# Patient Record
Sex: FEMALE | Race: WHITE | Employment: UNEMPLOYED | URBAN - METROPOLITAN AREA
[De-identification: names, ages, dates, MRNs, and addresses within clinical notes are randomized per-mention and may not be internally consistent; named-entity substitution may affect disease eponyms.]

---

## 2019-04-02 ENCOUNTER — APPOINTMENT (OUTPATIENT)
Dept: CT IMAGING | Age: 59
DRG: 751 | End: 2019-04-02
Payer: MEDICAID

## 2019-04-02 ENCOUNTER — HOSPITAL ENCOUNTER (INPATIENT)
Age: 59
LOS: 14 days | Discharge: HOME | DRG: 751 | End: 2019-04-16
Attending: EMERGENCY MEDICINE | Admitting: PSYCHIATRY & NEUROLOGY
Payer: MEDICAID

## 2019-04-02 DIAGNOSIS — N13.30 HYDRONEPHROSIS, UNSPECIFIED HYDRONEPHROSIS TYPE: Primary | ICD-10-CM

## 2019-04-02 DIAGNOSIS — R45.851 SUICIDAL IDEATION: ICD-10-CM

## 2019-04-02 DIAGNOSIS — N20.1 URETERAL CALCULUS: ICD-10-CM

## 2019-04-02 PROBLEM — F32.9 MAJOR DEPRESSION, CHRONIC: Status: ACTIVE | Noted: 2019-04-02

## 2019-04-02 PROBLEM — F32.2 SEVERE MAJOR DEPRESSIVE DISORDER (HCC): Status: ACTIVE | Noted: 2019-04-02

## 2019-04-02 LAB
ABSOLUTE EOS #: 0.3 K/UL (ref 0–0.4)
ABSOLUTE IMMATURE GRANULOCYTE: ABNORMAL K/UL (ref 0–0.3)
ABSOLUTE LYMPH #: 1.4 K/UL (ref 1–4.8)
ABSOLUTE MONO #: 0.7 K/UL (ref 0.1–1.3)
ALBUMIN SERPL-MCNC: 4 G/DL (ref 3.5–5.2)
ALBUMIN/GLOBULIN RATIO: ABNORMAL (ref 1–2.5)
ALP BLD-CCNC: 112 U/L (ref 35–104)
ALT SERPL-CCNC: 95 U/L (ref 5–33)
AMPHETAMINE SCREEN URINE: NEGATIVE
ANION GAP SERPL CALCULATED.3IONS-SCNC: 14 MMOL/L (ref 9–17)
AST SERPL-CCNC: 46 U/L
BARBITURATE SCREEN URINE: NEGATIVE
BASOPHILS # BLD: 1 % (ref 0–2)
BASOPHILS ABSOLUTE: 0.1 K/UL (ref 0–0.2)
BENZODIAZEPINE SCREEN, URINE: NEGATIVE
BILIRUB SERPL-MCNC: 0.31 MG/DL (ref 0.3–1.2)
BILIRUBIN URINE: NEGATIVE
BUN BLDV-MCNC: 17 MG/DL (ref 8–23)
BUN/CREAT BLD: ABNORMAL (ref 9–20)
BUPRENORPHINE URINE: ABNORMAL
CALCIUM SERPL-MCNC: 9.4 MG/DL (ref 8.6–10.4)
CANNABINOID SCREEN URINE: POSITIVE
CHLORIDE BLD-SCNC: 103 MMOL/L (ref 98–107)
CO2: 23 MMOL/L (ref 20–31)
COCAINE METABOLITE, URINE: POSITIVE
COLOR: YELLOW
COMMENT UA: NORMAL
CREAT SERPL-MCNC: 0.73 MG/DL (ref 0.5–0.9)
DIFFERENTIAL TYPE: ABNORMAL
EOSINOPHILS RELATIVE PERCENT: 3 % (ref 0–4)
GFR AFRICAN AMERICAN: >60 ML/MIN
GFR NON-AFRICAN AMERICAN: >60 ML/MIN
GFR SERPL CREATININE-BSD FRML MDRD: ABNORMAL ML/MIN/{1.73_M2}
GFR SERPL CREATININE-BSD FRML MDRD: ABNORMAL ML/MIN/{1.73_M2}
GLUCOSE BLD-MCNC: 99 MG/DL (ref 70–99)
GLUCOSE URINE: NEGATIVE
HCT VFR BLD CALC: 43.6 % (ref 36–46)
HEMOGLOBIN: 14.4 G/DL (ref 12–16)
IMMATURE GRANULOCYTES: ABNORMAL %
INR BLD: 0.9
KETONES, URINE: NEGATIVE
LEUKOCYTE ESTERASE, URINE: NEGATIVE
LIPASE: 36 U/L (ref 13–60)
LYMPHOCYTES # BLD: 17 % (ref 24–44)
MCH RBC QN AUTO: 27.5 PG (ref 26–34)
MCHC RBC AUTO-ENTMCNC: 32.9 G/DL (ref 31–37)
MCV RBC AUTO: 83.5 FL (ref 80–100)
MDMA URINE: ABNORMAL
METHADONE SCREEN, URINE: NEGATIVE
METHAMPHETAMINE, URINE: ABNORMAL
MONOCYTES # BLD: 9 % (ref 1–7)
NITRITE, URINE: NEGATIVE
NRBC AUTOMATED: ABNORMAL PER 100 WBC
OPIATES, URINE: NEGATIVE
OXYCODONE SCREEN URINE: NEGATIVE
PARTIAL THROMBOPLASTIN TIME: 25.1 SEC (ref 24–36)
PDW BLD-RTO: 14.1 % (ref 11.5–14.9)
PH UA: 5 (ref 5–8)
PHENCYCLIDINE, URINE: NEGATIVE
PLATELET # BLD: 194 K/UL (ref 150–450)
PLATELET ESTIMATE: ABNORMAL
PMV BLD AUTO: 9.7 FL (ref 6–12)
POTASSIUM SERPL-SCNC: 4.5 MMOL/L (ref 3.7–5.3)
PROPOXYPHENE, URINE: ABNORMAL
PROTEIN UA: NEGATIVE
PROTHROMBIN TIME: 12.4 SEC (ref 11.8–14.6)
RBC # BLD: 5.23 M/UL (ref 4–5.2)
RBC # BLD: ABNORMAL 10*6/UL
SEG NEUTROPHILS: 70 % (ref 36–66)
SEGMENTED NEUTROPHILS ABSOLUTE COUNT: 5.7 K/UL (ref 1.3–9.1)
SODIUM BLD-SCNC: 140 MMOL/L (ref 135–144)
SPECIFIC GRAVITY UA: 1.01 (ref 1–1.03)
TEST INFORMATION: ABNORMAL
TOTAL PROTEIN: 6.7 G/DL (ref 6.4–8.3)
TRICYCLIC ANTIDEPRESSANTS, UR: ABNORMAL
TURBIDITY: CLEAR
URINE HGB: NEGATIVE
UROBILINOGEN, URINE: NORMAL
WBC # BLD: 8.1 K/UL (ref 3.5–11)
WBC # BLD: ABNORMAL 10*3/UL

## 2019-04-02 PROCEDURE — 81003 URINALYSIS AUTO W/O SCOPE: CPT

## 2019-04-02 PROCEDURE — 80307 DRUG TEST PRSMV CHEM ANLYZR: CPT

## 2019-04-02 PROCEDURE — 1240000000 HC EMOTIONAL WELLNESS R&B

## 2019-04-02 PROCEDURE — 6370000000 HC RX 637 (ALT 250 FOR IP): Performed by: NURSE PRACTITIONER

## 2019-04-02 PROCEDURE — 74176 CT ABD & PELVIS W/O CONTRAST: CPT

## 2019-04-02 PROCEDURE — 83690 ASSAY OF LIPASE: CPT

## 2019-04-02 PROCEDURE — 93971 EXTREMITY STUDY: CPT

## 2019-04-02 PROCEDURE — 36415 COLL VENOUS BLD VENIPUNCTURE: CPT

## 2019-04-02 PROCEDURE — 85730 THROMBOPLASTIN TIME PARTIAL: CPT

## 2019-04-02 PROCEDURE — 85025 COMPLETE CBC W/AUTO DIFF WBC: CPT

## 2019-04-02 PROCEDURE — 80053 COMPREHEN METABOLIC PANEL: CPT

## 2019-04-02 PROCEDURE — 99285 EMERGENCY DEPT VISIT HI MDM: CPT

## 2019-04-02 PROCEDURE — 85610 PROTHROMBIN TIME: CPT

## 2019-04-02 RX ORDER — HYDROXYZINE HYDROCHLORIDE 25 MG/1
25 TABLET, FILM COATED ORAL 3 TIMES DAILY PRN
Status: DISCONTINUED | OUTPATIENT
Start: 2019-04-02 | End: 2019-04-16 | Stop reason: HOSPADM

## 2019-04-02 RX ORDER — NICOTINE 21 MG/24HR
1 PATCH, TRANSDERMAL 24 HOURS TRANSDERMAL DAILY
Status: DISCONTINUED | OUTPATIENT
Start: 2019-04-02 | End: 2019-04-02

## 2019-04-02 RX ORDER — BENZTROPINE MESYLATE 1 MG/ML
2 INJECTION INTRAMUSCULAR; INTRAVENOUS 2 TIMES DAILY PRN
Status: DISCONTINUED | OUTPATIENT
Start: 2019-04-02 | End: 2019-04-16 | Stop reason: HOSPADM

## 2019-04-02 RX ORDER — TAMSULOSIN HYDROCHLORIDE 0.4 MG/1
0.4 CAPSULE ORAL ONCE
Status: COMPLETED | OUTPATIENT
Start: 2019-04-02 | End: 2019-04-02

## 2019-04-02 RX ORDER — IBUPROFEN 800 MG/1
800 TABLET ORAL ONCE
Status: COMPLETED | OUTPATIENT
Start: 2019-04-02 | End: 2019-04-02

## 2019-04-02 RX ORDER — ACETAMINOPHEN 325 MG/1
650 TABLET ORAL EVERY 4 HOURS PRN
Status: DISCONTINUED | OUTPATIENT
Start: 2019-04-02 | End: 2019-04-16 | Stop reason: HOSPADM

## 2019-04-02 RX ORDER — HALOPERIDOL 5 MG/ML
5 INJECTION INTRAMUSCULAR EVERY 4 HOURS PRN
Status: DISCONTINUED | OUTPATIENT
Start: 2019-04-02 | End: 2019-04-16 | Stop reason: HOSPADM

## 2019-04-02 RX ORDER — TRAZODONE HYDROCHLORIDE 50 MG/1
50 TABLET ORAL NIGHTLY PRN
Status: DISCONTINUED | OUTPATIENT
Start: 2019-04-02 | End: 2019-04-07

## 2019-04-02 RX ORDER — IBUPROFEN 800 MG/1
800 TABLET ORAL 3 TIMES DAILY PRN
Status: DISCONTINUED | OUTPATIENT
Start: 2019-04-02 | End: 2019-04-16 | Stop reason: HOSPADM

## 2019-04-02 RX ORDER — OLANZAPINE 5 MG/1
5 TABLET ORAL
Status: ACTIVE | OUTPATIENT
Start: 2019-04-02 | End: 2019-04-02

## 2019-04-02 RX ORDER — MAGNESIUM HYDROXIDE/ALUMINUM HYDROXICE/SIMETHICONE 120; 1200; 1200 MG/30ML; MG/30ML; MG/30ML
30 SUSPENSION ORAL EVERY 6 HOURS PRN
Status: DISCONTINUED | OUTPATIENT
Start: 2019-04-02 | End: 2019-04-16 | Stop reason: HOSPADM

## 2019-04-02 RX ORDER — TAMSULOSIN HYDROCHLORIDE 0.4 MG/1
0.4 CAPSULE ORAL DAILY
Status: DISCONTINUED | OUTPATIENT
Start: 2019-04-03 | End: 2019-04-16 | Stop reason: HOSPADM

## 2019-04-02 RX ADMIN — HYDROXYZINE HYDROCHLORIDE 25 MG: 25 TABLET, FILM COATED ORAL at 21:06

## 2019-04-02 RX ADMIN — TRAZODONE HYDROCHLORIDE 50 MG: 50 TABLET ORAL at 21:06

## 2019-04-02 RX ADMIN — TAMSULOSIN HYDROCHLORIDE 0.4 MG: 0.4 CAPSULE ORAL at 16:20

## 2019-04-02 RX ADMIN — IBUPROFEN 800 MG: 800 TABLET ORAL at 16:20

## 2019-04-02 RX ADMIN — MICONAZOLE NITRATE: 20 CREAM TOPICAL at 21:07

## 2019-04-02 ASSESSMENT — PAIN DESCRIPTION - LOCATION: LOCATION: LEG

## 2019-04-02 ASSESSMENT — SLEEP AND FATIGUE QUESTIONNAIRES
DO YOU USE A SLEEP AID: NO
SLEEP PATTERN: DIFFICULTY FALLING ASLEEP;INSOMNIA;RESTLESSNESS
DO YOU HAVE DIFFICULTY SLEEPING: YES
AVERAGE NUMBER OF SLEEP HOURS: 0

## 2019-04-02 ASSESSMENT — LIFESTYLE VARIABLES: HISTORY_ALCOHOL_USE: NO

## 2019-04-02 ASSESSMENT — PAIN SCALES - GENERAL
PAINLEVEL_OUTOF10: 3
PAINLEVEL_OUTOF10: 0
PAINLEVEL_OUTOF10: 3

## 2019-04-02 ASSESSMENT — ENCOUNTER SYMPTOMS
COUGH: 0
BACK PAIN: 0
NAUSEA: 0
ABDOMINAL PAIN: 0
VOMITING: 0
SHORTNESS OF BREATH: 0
TROUBLE SWALLOWING: 0

## 2019-04-02 ASSESSMENT — PATIENT HEALTH QUESTIONNAIRE - PHQ9
SUM OF ALL RESPONSES TO PHQ QUESTIONS 1-9: 26
SUM OF ALL RESPONSES TO PHQ QUESTIONS 1-9: 26

## 2019-04-02 ASSESSMENT — PAIN DESCRIPTION - PAIN TYPE: TYPE: ACUTE PAIN

## 2019-04-02 ASSESSMENT — PAIN DESCRIPTION - DESCRIPTORS: DESCRIPTORS: ACHING

## 2019-04-02 ASSESSMENT — PAIN DESCRIPTION - ORIENTATION: ORIENTATION: LEFT

## 2019-04-02 NOTE — ED NOTES
Preformed SBIRT screening with the patient and the patient states that she uses crack and cocaine on a weekly basis. The patient also states that she is smokes marijuana on a daily basis for anxiety, depression, and sleep. The states that she is experiencing SI and has a plan. The patient states that she would like help to be drug free.       Kymberly Rivera [facility]  04/02/19 1523

## 2019-04-02 NOTE — ED PROVIDER NOTES
[facility]  eMERGENCY dEPARTMENT eNCOUnter   Independent Attestation     Pt Name: Lico Jacobs  MRN: [id number]  [place] 1960  Date of evaluation: 4/2/19     Lico Jacobs is a 59 y.o. female with CC: Leg Pain (left); Mental Health Problem; Depression; and Suicidal      Based on the medical record the care appears appropriate. I was personally available for consultation in the Emergency Department. Patient presented with flank pain. Diagnosed with small kidney stone in the ED, otherwise unremarkable labs. Patient will be treated symptomatically for a kidney stone, no emergent need for urologic intervention given normal kidney function, well-controlled pain, normal labs. Patient endorsing suicidal ideation. Patient is medically cleared for psychiatric evaluation. Per psychiatry, patient will be admitted for suicidal ideation.     Keron Del Valle MD  Attending Emergency Physician                   Keron Del Valle MD  04/02/19 7270

## 2019-04-02 NOTE — ED NOTES
Upon assessment, patient states it was recommended that she come here because she is depressed with suicidal ideations. Pt state her plan would be to take sleeping pills and just go to sleep.       Jhon Rodriguez RN  04/02/19 1400

## 2019-04-02 NOTE — BH NOTE
Patient given tobacco quitline number [phone] at this time, refusing to call at this time, states \" I don't smoke. Never did. \"

## 2019-04-02 NOTE — ED PROVIDER NOTES
[address]  eMERGENCYdEPARTMENT eNCOUnter      Pt Name: Gilford Reading  MRN: [id number]  [place] 1960  Date of evaluation: 4/2/2019  Provider:TERRI CUMMINGS [id number]       Chief Complaint   Patient presents with    Leg Pain     left    Mental Health Problem    Depression    Suicidal       HISTORY OF PRESENT ILLNESS  (Location/Symptom, Timing/Onset, Context/Setting, Quality, Duration, Modifying Factors, Severity.)   Gilford Reading is a 59 y.o. female who presents to the emergency department with complaints of pain in left leg. States that her leg felt swollen yesterday and patient is concerned for DVT. Dates that pain is mostly in the left lower leg but radiates up to left posterior medial thigh. No trauma or injury. History of DVT in the past.  Patient also reports left flank pain for the past few days. No abdominal pain, nausea or vomiting. No urinary symptoms. No fever or chills. History of colon cancer, has ileostomy. In addition, patient relates that she has history of depression and has been feeling suicidal for the past few days. Patient relates that she currently has no job, no insurance family doctor. Patient has a plan of taking sleeping pills to hurt herself. Patient admits that she is addicted to crack, last used 4 days ago. Denies IV drugs or alcohol or tobacco.      Nursing Notes were reviewed and I agree. REVIEW OF SYSTEMS    (2-9 systems for level 4,10 or more for level 5)      Review of Systems   Constitutional: Negative for chills and fever. HENT: Negative for trouble swallowing. Respiratory: Negative for cough and shortness of breath. Cardiovascular: Negative for chest pain and palpitations. Gastrointestinal: Negative for abdominal pain, nausea and vomiting. Genitourinary: Positive for flank pain. Negative for dysuria, vaginal bleeding and vaginal discharge. Musculoskeletal: Negative for back pain and neck pain.         Left leg pain intact, brisk cap refill. Neurological: She is alert and oriented to person, place, and time. Coordination normal.   Skin: Skin is warm and dry. She is not diaphoretic. Psychiatric: Her speech is normal and behavior is normal. She exhibits a depressed mood. She expresses suicidal ideation. She expresses suicidal plans (taking sleeping pills). Tearful        DIAGNOSTIC RESULTS     RADIOLOGY:   Ct Abdomen Pelvis Wo Contrast    Result Date: 4/2/2019  EXAMINATION: CT OF THE ABDOMEN AND PELVIS WITHOUT CONTRAST 4/2/2019 2:16 pm TECHNIQUE: CT of the abdomen and pelvis was performed without the administration of intravenous contrast. Multiplanar reformatted images are provided for review. Dose modulation, iterative reconstruction, and/or weight based adjustment of the mA/kV was utilized to reduce the radiation dose to as low as reasonably achievable. COMPARISON: None HISTORY: ORDERING SYSTEM PROVIDED HISTORY: flank pain TECHNOLOGIST PROVIDED HISTORY: Ordering Physician Provided Reason for Exam: lt side flank ellie x 10 days Acuity: Unknown Type of Exam: Unknown Relevant Medical/Surgical History: mult abd surgeries, hx colon ca FINDINGS: Lower Chest: Lung bases are clear. Heart is not enlarged. No pericardial effusion. Organs: No suspicious hepatic lesions. Lobulated fluid density lesion in hepatic segment 6 is most likely a cyst and requires no additional imaging follow-up. Cholecystectomy clips. Normal spleen, pancreas, adrenal glands, and right kidney. There is left-sided hydronephrosis. A surgical clip is in close proximity to the left ureter and there is a possible 2-3 mm nonobstructing calculus in the distal left ureter (series 2, image 130. GI/Bowel: Postoperative changes from bowel resection with anastomosis. No dilated bowel. A right lower quadrant colostomy is present. Subtotal colectomy evident. There is no evidence of mass. Minimal hiatal hernia. Pelvis: Absent uterus.   Bladder is normal for degree of distention. Peritoneum/Retroperitoneum: Atherosclerotic changes of aorta. No lymphadenopathy. No free fluid. No free air. Bones/Soft Tissues: Atrophy of the rectus musculature. Right lower quadrant colostomy. Mild pelvic muscle atrophy. No fluid collections in the abdominal wall. Multilevel degenerative changes with endplate irregularity of L2 suspicious of age-indeterminate compression deformity. 1. Left-sided hydronephrosis with associated 2-3 mm distal left ureteral calculus. 2. Status post colectomy with right lower quadrant colostomy. 3. Minimal hiatal hernia. LABS:  Labs Reviewed   CBC WITH AUTO DIFFERENTIAL - Abnormal; Notable for the following components:       Result Value    RBC 5.23 (*)     Seg Neutrophils 70 (*)     Lymphocytes 17 (*)     Monocytes 9 (*)     All other components within normal limits   COMPREHENSIVE METABOLIC PANEL - Abnormal; Notable for the following components:    Alkaline Phosphatase 112 (*)     ALT 95 (*)     AST 46 (*)     All other components within normal limits   URINE DRUG SCREEN - Abnormal; Notable for the following components:    Cocaine Metabolite, Urine POSITIVE (*)     Cannabinoid Scrn, Ur POSITIVE (*)     All other components within normal limits   LIPASE   URINE RT REFLEX TO CULTURE   PROTIME-INR   APTT       All other labs were within normal range or not returned asof this dictation. EMERGENCYDEPARTMENT COURSE and DIFFERENTIAL DIAGNOSIS/MDM:   Patient presents to ED with complaints of left flank pain and left leg pain. History of DVT in the past, ordering venous Doppler. Patient also complains of left flank pain, ordering blood work and CT scan to rule out kidney stone. She also reported that she was feeling suicidal,  notified. 3:13 PM CT shows left-sided hydronephrosis with associated 2 or 3 mm distal left ureteral calculus. Venous Doppler shows no acute DVT. Lab work pending.  4:21 PM and work unremarkable. Urinalysis shows no evidence of infection. Patient has small kidney stone, we'll start on Flomax and ibuprofen. Patient is medically cleared. Patient will be moved to [facility] for further evaluation. Vitals:    Vitals:    04/02/19 1341   BP: (!) 160/94   Pulse: 77   Resp: 16   Temp: 98.4 °F (36.9 °C)   SpO2: 100%   Weight: 180 lb (81.6 kg)   Height: 5' 2\" (1.575 m)       Vitals reviewed. PROCEDURES:  None    FINAL IMPRESSION      1. Hydronephrosis, unspecified hydronephrosis type    2. Ureteral calculus    3. Suicidal ideation          DISPOSITION/PLAN   DISPOSITION        PATIENT REFERRED TO:  No follow-up provider specified.     DISCHARGE MEDICATIONS:  New Prescriptions    No medications on file       (Please note thatportions of this note were completed with a voice recognition program.  Efforts were made to edit the dictations but occasionally words are mis-transcribed.)    Avinash Chahal, TERRI - CNP  04/02/19 6760

## 2019-04-02 NOTE — BH NOTE
`[facility]  Admission Note     Admission Type:   Admission Type: Voluntary    Reason for admission:  Reason for Admission: Patient suicidal with plan to OD on pills,  3 years ago and got a settlement and has spent it all on crack cocaine.       PATIENT STRENGTHS:  Strengths: Communication, Social Skills    Patient Strengths and Limitations:  Limitations: Inappropriate/potentially harmful leisure interests, Lacks leisure interests    Addictive Behavior:   Addictive Behavior  In the past 3 months, have you felt or has someone told you that you have a problem with:  : None  Do you have a history of Chemical Use?: No  Do you have a history of Alcohol Use?: No  Do you have a history of Street Drug Abuse?: Yes  Histroy of Prescripton Drug Abuse?: No    Medical Problems:   Past Medical History:   Diagnosis Date    Cancer ([facility] [address]     Hypertension     Thyroid disease        Status EXAM:  Status and Exam  Normal: Yes  Facial Expression: Brightened  Affect: Appropriate  Level of Consciousness: Alert  Mood:Normal: No  Mood: Depressed, Anxious, Sad  Motor Activity:Normal: Yes  Interview Behavior: Cooperative  Preception: [place] to Person, [place] to Time, [place] to Place, [place] to Situation  Attention:Normal: Yes  Thought Processes: Circumstantial  Thought Content:Normal: Yes  Hallucinations: None  Delusions: No  Memory:Normal: No  Memory: Poor Recent  Insight and Judgment: No  Insight and Judgment: Poor Judgment, Poor Insight  Present Suicidal Ideation: No  Present Homicidal Ideation: No    Tobacco Screening:  Practical Counseling, on admission, cheikh X, if applicable and completed (first 3 are required if patient doesn't refuse):            ( )  Recognizing danger situations (included triggers and roadblocks)                    ( )  Coping skills (new ways to manage stress, exercise, relaxation techniques, changing routine, distraction)                                                           ( ) Basic information about quitting (benefits of quitting, techniques in how to quit, available resources  ( ) Referral for counseling faxed to [facility]                                           ( ) Patient refused counseling  ( ) Patient has not smoked in the last 30 days    Metabolic Screening:    No results found for: LABA1C    No results for input(s): CHOL, TRIG, HDL, LDLCALC, LABVLDL in the last 72 hours. Body mass index is 32.92 kg/m². BP Readings from Last 2 Encounters:   04/02/19 (!) 149/91       Patient presented to [employer] reporting possible DVT. After this was ruled out, patient admitted to depression lasting since her divorce 3 years ago and suicidal ideations with a plan to overdose on pills. Upon admission, pt reports depression and anxiety along with suicidal ideations with no plan. Pt admitted with followings belongings:  Dentures: None  Vision - Corrective Lenses: Glasses  Hearing Aid: None  Jewelry: None  Body Piercings Removed: N/A  Clothing: Footwear, Jacket / coat, Pants, Shirt, Undergarments (Comment)  Were All Patient Medications Collected?: Not Applicable  Other Valuables: Cell phone, Money (Comment), Keys     Valuables sent to safe. Valuables placed in safe in security envelope, number:  [id number]. Patient's home medications were not brought in. Patient oriented to surroundings and program expectations and copy of patient rights given. Received admission packet:  yes. Consents reviewed, signed yes. Refused no. Patient verbalize understanding:  yes.     Patient education on precautions: yes                   Alicia Polanco RN

## 2019-04-02 NOTE — ED NOTES
Pt reports history of crack cocaine use. Pt states she last used Saturday.       Jenny Boss RN  04/02/19 1400

## 2019-04-02 NOTE — ED NOTES

## 2019-04-02 NOTE — ED NOTES
Provisional Diagnosis:   Depression    Psychosocial and Contextual Factors:   Patient has relationship issues. C-SSRS Summary:      Patient: X  Family:   Agency:       Present Suicidal Behavior:  X    Verbal: Patient reports a plan to overdose on medications. Attempt: Patient denies. Past Suicidal Behavior: Patient denies. Verbal:     Attempt:     Substance Abuse: Patient reports marijuana, cocaine and crack. Self-Injurious/Self-Mutilation: Patient denies. Trauma Identified:   Patient denies. Protective Factors:    Patient has housing. Risk Factors:    Patient does not have insurance. Patient is not currently linked with mental health agency. Patient is not currently taking psych medications. Patient has substance abuse issues. Clinical Summary:    Patient is a 59year old  female that brings herself to the ED today for possible DVT but then disclosed mental health problems to ED staff. Patient reports she wants to die every day. Patient reports she has been feeling this way for a couple years. Patient reports she has been having thoughts of overdosing on pills. Patient states \"I want to go to sleep and not wake up. \" Patient denies any previous attempts. Patient denies H/I at this time. Patient denies auditory or visual hallucinations. Patient reports she is not currently linked with a mental health agency or taking any psych medications. Patient reports she has never received any type of mental health treatment. Patient reports her concerns initially began 3 years ago after she got . Patient reports she is currently living with her brother but does not consider it permanent housing. Patient reports she is currently using marijuana, cocaine and crack almost daily. Patient reports poor sleep. Patient denies any legal issues. Patient expressed interest in AOD treatment at [facility] once mentally stabilized. Patient is voluntary.        Level of Care Disposition:    Writer consulted with Whitney Guo CNP, psychiatry. Patient to be admitted to the [facility] under Dr. Seth Skelton for safety and stabilization.

## 2019-04-03 PROCEDURE — 6370000000 HC RX 637 (ALT 250 FOR IP): Performed by: NURSE PRACTITIONER

## 2019-04-03 PROCEDURE — 99253 IP/OBS CNSLTJ NEW/EST LOW 45: CPT | Performed by: INTERNAL MEDICINE

## 2019-04-03 PROCEDURE — 90792 PSYCH DIAG EVAL W/MED SRVCS: CPT | Performed by: NURSE PRACTITIONER

## 2019-04-03 PROCEDURE — 1240000000 HC EMOTIONAL WELLNESS R&B

## 2019-04-03 RX ORDER — CLONIDINE HYDROCHLORIDE 0.1 MG/1
0.1 TABLET ORAL 3 TIMES DAILY
Status: DISCONTINUED | OUTPATIENT
Start: 2019-04-03 | End: 2019-04-05

## 2019-04-03 RX ORDER — PROMETHAZINE HYDROCHLORIDE 25 MG/ML
12.5 INJECTION, SOLUTION INTRAMUSCULAR; INTRAVENOUS EVERY 4 HOURS PRN
Status: DISCONTINUED | OUTPATIENT
Start: 2019-04-03 | End: 2019-04-05

## 2019-04-03 RX ORDER — DICYCLOMINE HCL 20 MG
20 TABLET ORAL 4 TIMES DAILY PRN
Status: DISCONTINUED | OUTPATIENT
Start: 2019-04-03 | End: 2019-04-05

## 2019-04-03 RX ORDER — PROMETHAZINE HYDROCHLORIDE 25 MG/1
25 TABLET ORAL EVERY 4 HOURS PRN
Status: DISCONTINUED | OUTPATIENT
Start: 2019-04-03 | End: 2019-04-05

## 2019-04-03 RX ORDER — DIPHENHYDRAMINE HCL 25 MG
25 TABLET ORAL NIGHTLY PRN
Status: DISCONTINUED | OUTPATIENT
Start: 2019-04-03 | End: 2019-04-09

## 2019-04-03 RX ORDER — CYCLOBENZAPRINE HCL 10 MG
10 TABLET ORAL 3 TIMES DAILY PRN
Status: DISCONTINUED | OUTPATIENT
Start: 2019-04-03 | End: 2019-04-05

## 2019-04-03 RX ORDER — SERTRALINE HYDROCHLORIDE 25 MG/1
25 TABLET, FILM COATED ORAL DAILY
Status: DISCONTINUED | OUTPATIENT
Start: 2019-04-03 | End: 2019-04-05

## 2019-04-03 RX ADMIN — MICONAZOLE NITRATE: 20 CREAM TOPICAL at 08:38

## 2019-04-03 RX ADMIN — HYDROXYZINE HYDROCHLORIDE 25 MG: 25 TABLET, FILM COATED ORAL at 17:52

## 2019-04-03 RX ADMIN — TRAZODONE HYDROCHLORIDE 50 MG: 50 TABLET ORAL at 20:50

## 2019-04-03 RX ADMIN — CYCLOBENZAPRINE HYDROCHLORIDE 10 MG: 10 TABLET, FILM COATED ORAL at 17:51

## 2019-04-03 RX ADMIN — DICYCLOMINE HYDROCHLORIDE 20 MG: 20 TABLET ORAL at 17:52

## 2019-04-03 RX ADMIN — TAMSULOSIN HYDROCHLORIDE 0.4 MG: 0.4 CAPSULE ORAL at 08:37

## 2019-04-03 RX ADMIN — SERTRALINE HYDROCHLORIDE 25 MG: 25 TABLET ORAL at 14:18

## 2019-04-03 RX ADMIN — HYDROXYZINE HYDROCHLORIDE 25 MG: 25 TABLET, FILM COATED ORAL at 08:37

## 2019-04-03 RX ADMIN — CLONIDINE HYDROCHLORIDE 0.1 MG: 0.1 TABLET ORAL at 20:50

## 2019-04-03 RX ADMIN — CLONIDINE HYDROCHLORIDE 0.1 MG: 0.1 TABLET ORAL at 17:51

## 2019-04-03 ASSESSMENT — LIFESTYLE VARIABLES: HISTORY_ALCOHOL_USE: NO

## 2019-04-03 ASSESSMENT — SLEEP AND FATIGUE QUESTIONNAIRES
AVERAGE NUMBER OF SLEEP HOURS: 7
DIFFICULTY FALLING ASLEEP: NO
RESTFUL SLEEP: NO
DIFFICULTY STAYING ASLEEP: NO
SLEEP PATTERN: NORMAL
DO YOU HAVE DIFFICULTY SLEEPING: NO
DIFFICULTY ARISING: NO
DO YOU USE A SLEEP AID: NO

## 2019-04-03 NOTE — PLAN OF CARE
[facility]  Initial Interdisciplinary Treatment Plan NO      Original treatment plan Date & Time: 4/3/2019  0730    Admission Type:  Admission Type: Voluntary    Reason for admission:   Reason for Admission: Patient suicidal with plan to OD on pills,  3 years ago and got a settlement and has spent it all on crack cocaine.       Estimated Length of Stay:  5-7days  Estimated Discharge Date: to be determined by physician    PATIENT STRENGTHS:  Patient Strengths:Strengths: Communication, Social Skills  Patient Strengths and Limitations:Limitations: Inappropriate/potentially harmful leisure interests, Lacks leisure interests  Addictive Behavior: Addictive Behavior  In the past 3 months, have you felt or has someone told you that you have a problem with:  : None  Do you have a history of Chemical Use?: No  Do you have a history of Alcohol Use?: No  Do you have a history of Street Drug Abuse?: Yes  Histroy of Prescripton Drug Abuse?: No  Medical Problems:  Past Medical History:   Diagnosis Date    Cancer ([facility] [address]     Hypertension     Thyroid disease      Status EXAM:Status and Exam  Normal: Yes  Facial Expression: Brightened  Affect: Appropriate  Level of Consciousness: Alert  Mood:Normal: No  Mood: Depressed, Anxious, Sad  Motor Activity:Normal: Yes  Interview Behavior: Cooperative  Preception: [place] to Person, [address] to Time, [place] to Place, [place] to Situation  Attention:Normal: No  Attention: Distractible  Thought Processes: Circumstantial  Thought Content:Normal: No  Thought Content: Preoccupations  Hallucinations: None  Delusions: No  Memory:Normal: No  Memory: Poor Recent  Insight and Judgment: No  Insight and Judgment: Poor Judgment, Poor Insight  Present Suicidal Ideation: No  Present Homicidal Ideation: No    EDUCATION:   Learner Progress Toward Treatment Goals: reviewed group plans and strategies for care    Method:group therapy, medication compliance, individualized assessments and care planning    Outcome: needs reinforcement    PATIENT GOALS: to be discussed with patient within 72 hours    PLAN/TREATMENT RECOMMENDATIONS:     continue group therapy , medications compliance, goal setting, individualized assessments and care, continue to monitor pt on unit      SHORT-TERM GOALS:   Time frame for Short-Term Goals: 5-7 days    LONG-TERM GOALS:  Time frame for Long-Term Goals: 6 months  Members Present in Team Meeting: See Signature Sheet    KAMERON Gaviria

## 2019-04-03 NOTE — FLOWSHEET NOTE
Patient said she just came here yesterday hoping to find some answers. She did not go into details other than to say she's made some bad choices and she is reaping the consequences of those decisions. She grew [place] in the [place]; her grandfather was a Muslim . She is angry at God and feels betrayed on many levels and doesn't understand why things have happened to her mom and others in her life. She said she needs help sorting it all out. Writer offered listening presence and spiritual support. Patient asked if she could talk to writer again and appreciated prayer. Spiritual care will continue to offer support. 04/03/19 1621   Encounter Summary   Services provided to: Patient   Referral/Consult From: Other disciplines  ()   Continue Visiting   (4-3-19)   Complexity of Encounter High   Length of Encounter 30 minutes   Spiritual Assessment Completed Yes   Routine   Type Initial   Spiritual/Moravian   Type Spiritual support   Assessment Approachable;Tearful; Anxious;Questioning meaning/purpose;Questioning ellis   Intervention Active listening;Explored feelings, thoughts, concerns;Explored coping resources;Nurtured hope;Prayer;Sustaining presence/ Ministry of presence; Discussed relationship with God;Discussed meaning/purpose   Outcome Expressed gratitude;Engaged in conversation;Expressed feelings/needs/concerns;Receptive

## 2019-04-03 NOTE — GROUP NOTE
Group Therapy Note    Date: April 3    Group Start Time: 0098  Group End Time: 7150  Group Topic: BOSTON Gillis      Pt refuses to attend group d/t resting in room despite being encouraged to attend       Signature:  Russell Conn

## 2019-04-03 NOTE — CARE COORDINATION
BHI Biopsychosocial Assessment    Current Level of Psychosocial Functioning     Independent   Dependent  X  Minimal Assist     Comments:  PT has current diagnosis of Major Depressive Disorder, chronic, severe    Psychosocial High Risk Factors (check all that apply)    Unable to obtain meds   Chronic illness/pain    Substance abuse X  Lack of Family Support   Financial stress X  Isolation X  Inadequate Community Resources X  Suicide attempt(s) X  Not taking medications X  Victim of crime   Developmental Delay  Unable to manage personal needs    Age 72 or older   Homeless  No transportation   Readmission within 30 days  Unemployment X  Traumatic Event X    Psychiatric Advanced Directives: Nothing reported    Family to Involve in Treatment: Ex- and brother at time of discharge will provide support    Sexual Orientation:  PT not in a relationship    Patient Strengths: Family support; survivor of colon cancer    Patient Barriers: No income; no health insurance; suicidal ideation; multiple life stressors; multiple physical health issues    Opioid/AOD Referral and Education Provided:  Writer will refer PT to CD counselor and spoke with PT about different types of outpatient treatment    CMHC/mental health history: PT is currently not linked for services. PT will be [facility] with [facility]. Plan of Care   medication management, group/individual therapies, family meetings, psycho -education, treatment team meetings to assist with stabilization    Initial Discharge Plan:  Start and stabilize on medications; assist if any withdraw symptoms from crack cocaine; possible family meeting; link with [facility]    Clinical Summary:  Writer meets with PT and completes assessment. PT is A&O x4; presents with depression as evidence by depressed/sad mood, loss of interest in pleasurable activities, fatigue and insomnia, feelings of worthlessness, hopelessness, inability to concentrate, and thoughts of suicide.   PT reports poor sleep and no appetite. PT is tearful but cooperative throughout assessment. PT currently appears absent of self-harm. PT denies all hallucinations/delusions; no legal issues; and, no HI at this time. PT denies past suicide attempts but does confirm a long struggle with depression. PT reports never obtained treatment and/or medications for depression and currently not linked. PT reports her concerns initially began 3 years ago after she got . PT angry with ex- for selling the house but \"we still have a good relationship and he is my rock. \"  PT reports currently living with brother but does not consider it permanent housing. PT reports currently smoking marijuana and crack cocaine and periodically drinks \"maybe 3-4 times a year. \"  PT does admit to being on a \"weekend alcohol lori which was when I first started smoking crack cocaine. \"  PT  for over 27 years,  3 years ago, mother of 7 adult children between the ages of 24 and 3, and a stay at home mother. PT was born and raised in [place], graduated high school ([employer]), lived most of her life in [place] and spent 10 years in [place] with young son. PT now ; does receive support from ex-; biological father passed away when PT 13, mother remarried, and raised by both mother and step-father; 2 biological brothers , lives with older brother, no sisters and PT is the youngest child.

## 2019-04-03 NOTE — GROUP NOTE
Group Therapy Note    Date: April 3    Group Start Time: 1100  Group End Time: 1304  Group Topic: Psychoeducation    RIMMA Solorio, CTRS        Signature:  KAMERON GARNER CTRS

## 2019-04-03 NOTE — CONSULTS
[place] [address] [facility]    CONSULTATION / HISTORY AND PHYSICAL EXAMINATION            Date:   4/3/2019  Patient name:  Meredith Hightower  Date of admission:  4/2/2019  1:38 PM  MRN:   [id number]  Account:  [de-identified]  YOB: 1960  PCP:    No primary care provider on file. Room:   [address] [place] [address]  Code Status:    Full Code    Physician Requesting Consult: Marya Gaytan MD    Reason for Consult: Elevated liver enzymes,, left-sided hydronephrosis    Chief Complaint:     Chief Complaint   Patient presents with    Leg Pain     left    Mental Health Problem    Depression    Suicidal       History Obtained From:     patient, electronic medical record    History of Present Illness:   Patient is admitted for major depression  She has multiple medical problems which includes hypertension, hypothyroidism, history of colon cancer 13 years ago status post ileostomy, chemoradiation. Patient is not taking any medication for her thyroid. She was found positive for cannabinoids and cocaine, her liver enzymes were high. There is no previous liver enzymes to compare with  Patient is also complaining of abdominal pain, mainly located on left side of her abdomen. She had CT scan of abdomen suggestive of left-sided ureteric stone, with hydronephrosis  Abdominal pain is going on for last 2 weeks, she also is no noticed blood in the urine. She never diagnosed with renal stones in the past.      Past Medical History:     Past Medical History:   Diagnosis Date    Cancer ([address]     Hypertension     Thyroid disease         Past Surgical History:     Past Surgical History:   Procedure Laterality Date    HYSTERECTOMY      JOINT REPLACEMENT          Medications Prior to Admission:     Prior to Admission medications    Not on File        Allergies:     Clindamycin/lincomycin; Demerol hcl [meperidine];  Flagyl [metronidazole]; and Pcn [penicillins]    Social History:     Tobacco: reports that she has never smoked. She has never used smokeless tobacco.  Alcohol:      reports that she drank alcohol. Drug Use:  reports that she has current or past drug history. Drugs: Cocaine and Marijuana. Family History:     History reviewed. No pertinent family history. Review of Systems:     Positive and Negative as described in HPI. CONSTITUTIONAL:  negative for fevers, chills, sweats, fatigue, weight loss  HEENT:  negative for vision, hearing changes, runny nose, throat pain  RESPIRATORY:  negative for shortness of breath, cough, congestion, wheezing. CARDIOVASCULAR:  negative for chest pain, palpitations. GASTROINTESTINAL:  Positive for abdominal pain   GENITOURINARY:  negative for difficulty of urination, burning with urination, frequency   INTEGUMENT:  negative for rash, skin lesions, easy bruising   HEMATOLOGIC/LYMPHATIC:  negative for swelling/edema   ALLERGIC/IMMUNOLOGIC:  negative for urticaria , itching  ENDOCRINE:  negative increase in drinking, increase in urination, hot or cold intolerance  MUSCULOSKELETAL:  negative joint pains, muscle aches, swelling of joints  NEUROLOGICAL:  negative for headaches, dizziness, lightheadedness, numbness, pain, tingling extremities  BEHAVIOR/PSYCH:  negative for depression, anxiety    Physical Exam:     /76   Pulse 80   Temp 97.2 °F (36.2 °C) (Axillary)   Resp 18   Ht 5' 2\" (1.575 m)   Wt 180 lb (81.6 kg)   LMP  (Exact Date)   SpO2 100%   Breastfeeding? No   BMI 32.92 kg/m²   Temp (24hrs), Av.3 °F (36.8 °C), Min:97.2 °F (36.2 °C), Max:99.1 °F (37.3 °C)    No results for input(s): POCGLU in the last 72 hours. No intake or output data in the 24 hours ending 19 1106    General Appearance:  alert, well appearing, and in no acute distress  Mental status: oriented to person, place, and time with normal affect  Head:  normocephalic, atraumatic.   Eye: no icterus, redness, pupils equal and reactive, extraocular eye movements intact, Urine NOT REPORTED NEGATIVE    Cannabinoid Scrn, Ur POSITIVE (A) NEGATIVE    Oxycodone Screen, Ur NEGATIVE NEGATIVE    Methamphetamine, Urine NOT REPORTED NEGATIVE    Tricyclic Antidepressants, Urine NOT REPORTED NEGATIVE    MDMA, Urine NOT REPORTED NEGATIVE    Buprenorphine Urine NOT REPORTED NEGATIVE    Test Information       Assay provides medical screening only. The absence of expected drug(s) and/or metabolite(s) may indicate diluted or adulterated urine, limitations of testing or timing of collection.    CBC Auto Differential    Collection Time: 04/02/19  2:34 PM   Result Value Ref Range    WBC 8.1 3.5 - 11.0 k/uL    RBC 5.23 (H) 4.0 - 5.2 m/uL    Hemoglobin 14.4 12.0 - 16.0 g/dL    Hematocrit 43.6 36 - 46 %    MCV 83.5 80 - 100 fL    MCH 27.5 26 - 34 pg    MCHC 32.9 31 - 37 g/dL    RDW 14.1 11.5 - 14.9 %    Platelets 522 677 - 601 k/uL    MPV 9.7 6.0 - 12.0 fL    NRBC Automated NOT REPORTED per 100 WBC    Differential Type NOT REPORTED     Seg Neutrophils 70 (H) 36 - 66 %    Lymphocytes 17 (L) 24 - 44 %    Monocytes 9 (H) 1 - 7 %    Eosinophils % 3 0 - 4 %    Basophils 1 0 - 2 %    Immature Granulocytes NOT REPORTED 0 %    Segs Absolute 5.70 1.3 - 9.1 k/uL    Absolute Lymph # 1.40 1.0 - 4.8 k/uL    Absolute Mono # 0.70 0.1 - 1.3 k/uL    Absolute Eos # 0.30 0.0 - 0.4 k/uL    Basophils # 0.10 0.0 - 0.2 k/uL    Absolute Immature Granulocyte NOT REPORTED 0.00 - 0.30 k/uL    WBC Morphology NOT REPORTED     RBC Morphology NOT REPORTED     Platelet Estimate NOT REPORTED    Comprehensive Metabolic Panel    Collection Time: 04/02/19  2:34 PM   Result Value Ref Range    Glucose 99 70 - 99 mg/dL    BUN 17 8 - 23 mg/dL    CREATININE 0.73 0.50 - 0.90 mg/dL    Bun/Cre Ratio NOT REPORTED 9 - 20    Calcium 9.4 8.6 - 10.4 mg/dL    Sodium 140 135 - 144 mmol/L    Potassium 4.5 3.7 - 5.3 mmol/L    Chloride 103 98 - 107 mmol/L    CO2 23 20 - 31 mmol/L    Anion Gap 14 9 - 17 mmol/L    Alkaline Phosphatase 112 (H) 35 - 104 U/L ALT 95 (H) 5 - 33 U/L    AST 46 (H) <32 U/L    Total Bilirubin 0.31 0.3 - 1.2 mg/dL    Total Protein 6.7 6.4 - 8.3 g/dL    Alb 4.0 3.5 - 5.2 g/dL    Albumin/Globulin Ratio NOT REPORTED 1.0 - 2.5    GFR Non-African American >60 >60 mL/min    GFR African American >60 >60 mL/min    GFR Comment          GFR Staging NOT REPORTED    Lipase    Collection Time: 19  2:34 PM   Result Value Ref Range    Lipase 36 13 - 60 U/L   Protime-INR    Collection Time: 19  2:34 PM   Result Value Ref Range    Protime 12.4 11.8 - 14.6 sec    INR 0.9    APTT    Collection Time: 19  2:34 PM   Result Value Ref Range    PTT 25.1 24.0 - 36.0 sec       Imaging/Diagonstics:  [address] Transitions 24 Hour Follow Up Call    4/3/2019    Patient: Niko Khan Patient : 1960    MRN: [id number]  Reason for Admission: No discharge information exists for this patient. Discharge Date:   [facility]: [facility] with: Facility: [unfilled]    Non-face-to-face services provided:      Inpatient Assessment  Care Transitions 24 Hour Call    Care Transitions Interventions         Follow Up  No future appointments. Sarkis Lagos MD  CT-scan of the abdomen    Assessment :      Primary Problem  <principal problem not specified>    Active Hospital Problems    Diagnosis Date Noted    Severe major [facility] disorder ([facility] [F32.2] 2019    Major depression, chronic [F34.1] 2019       Plan:     1. Elevated liver enzyme, patient refusing alcohol abuse, likely secondary to polysubstance abuse, we will repeat liver function test, acute hepatitis panel  2 . ureteric stone with left-sided hydronephrosis, concentric urology,  3 . Patient Ari Gan history of hypothyroidism, not taking any medication, awaiting results of thyroid function test  4 . Major depression, psychiatry following  5 colon cancer status post surgery, history of chemoradiation  6 . Cocaine abuse  7 . Marijuana abuse.      Consultations:   IP CONSULT TO HOSPITALIST  IP CONSULT TO INTERNAL MEDICINE      Eve Villarreal MD  4/3/2019  11:06 AM    Copy sent to Dr. Lanier primary care provider on file. Please note that this chart was generated using voice recognition Dragon dictation software. Although every effort was made to ensure the accuracy of this automated transcription, some errors in transcription may have occurred.

## 2019-04-03 NOTE — H&P
HISTORY and [facility]       NAME:  Samia Francis  MRN: [id number]   YOB: 1960   Date: 4/3/2019   Age: 59 y.o. Gender: female     COMPLAINT AND PRESENT HISTORY:      Samia Francis is 59 y.o.,  female, admitted because of depression. Pt has suicidal ideation, Pt has plans to drive her car off a phillip. Pt denies any homicidal ideation. Pt has history of previous suicide attempts. Patient has a history of cancer colon with subsequent surgery and ileostomy in place patient states that this was 13 years ago. patient also received radiation and chemotherapy  Pt feels hopeless, helpless, worthless, lack of interest, loss of energy. Poor insight. Pt has poor sleep, increased appetite, poor concentration and memory. No current auditory, visual or tactile hallucinations. Patients current stressors include getting  losing her house 3 years ago, patient also has a problem with crack cocaine, marijuana use. Patient lives with her brother, currently not working. Pt is not on psychiatric medications.       DIAGNOSTIC RESULTS   Labs:  CBC:   Recent Labs     04/02/19  1434   WBC 8.1   HGB 14.4        BMP:    Recent Labs     04/02/19  1434      K 4.5      CO2 23   BUN 17   CREATININE 0.73   GLUCOSE 99     Hepatic:   Recent Labs     04/02/19  1434   AST 46*   ALT 95*   BILITOT 0.31   ALKPHOS 112*     U/A:  Lab Results   Component Value Date    COLORU YELLOW 04/02/2019    SPECGRAV 1.007 04/02/2019    LEUKOCYTESUR NEGATIVE 04/02/2019    GLUCOSEU NEGATIVE 04/02/2019       PAST MEDICAL HISTORY     Past Medical History:   Diagnosis Date    [facility]     Hypertension     Thyroid disease        Pt denies any history of Diabetes mellitus type 2, stroke, heart disease, COPD, Asthma, GERD, HLD, Seizures, Kidney Disease, Hepatitis, TB.    SURGICAL HISTORY       Past Surgical History:   Procedure Laterality Date    HYSTERECTOMY      JOINT REPLACEMENT FAMILY HISTORY       Family History   Problem Relation Age of Onset    Alcohol Abuse Father     Coronary Art Dis Father          of MI at age 52    Hypertension Brother     Cancer Brother         Kidney       SOCIAL HISTORY       Social History     Socioeconomic History    Marital status:      Spouse name: None    Number of children: None    Years of education: None    Highest education level: None   Occupational History    None   Social Needs    Financial resource strain: None    Food insecurity:     Worry: None     Inability: None    Transportation needs:     Medical: None     Non-medical: None   Tobacco Use    Smoking status: Never Smoker    Smokeless tobacco: Never Used   Substance and Sexual Activity    Alcohol use: Not Currently    Drug use: Yes     Types: Cocaine, Marijuana     Comment: Crack Cocaine (smokes) for past couple years    Sexual activity: Not Currently   Lifestyle    Physical activity:     Days per week: None     Minutes per session: None    Stress: None   Relationships    Social connections:     Talks on phone: None     Gets together: None     Attends Worship service: None     Active member of club or organization: None     Attends meetings of clubs or organizations: None     Relationship status: None    Intimate partner violence:     Fear of current or ex partner: None     Emotionally abused: None     Physically abused: None     Forced sexual activity: None   Other Topics Concern    None   Social History Narrative    None           REVIEW OF SYSTEMS      Allergies   Allergen Reactions    Clindamycin/Lincomycin     Demerol Hcl [Meperidine]     Flagyl [Metronidazole]     Pcn [Penicillins]        No current facility-administered medications on file prior to encounter. No current outpatient medications on file prior to encounter. General health:  Fairly good. No fever or chills.                  Skin:  No itching, redness or rash.      Head, eyes, ears, nose, throat:  No headache, epistaxis, rhinorrhea hearing loss or sore throat. Neck:  No pain, stiffness or masses. Cardiovascular/Respiratory system:  No chest pain, palpitation, shortness of breath, coughing or expectoration. Gastrointestinal tract: No abdominal pain, nausea, vomiting, diarrhea or constipation. Genitourinary:  No burning on micturition. No hesitancy, urgency, frequency or discoloration of urine. Locomotor:  No bone or joint pains. No swelling or deformities. Neuropsychiatric:  See HPI. GENERAL PHYSICAL EXAM:     Vitals: /76   Pulse 80   Temp 97.2 °F (36.2 °C) (Axillary)   Resp 18   Ht 5' 2\" (1.575 m)   Wt 180 lb (81.6 kg)   LMP  (Exact Date)   SpO2 100%   Breastfeeding? No   BMI 32.92 kg/m²  Body mass index is 32.92 kg/m². Pt was examined with a nurse present in the room. GENERAL APPEARANCE:  Alexia Arcos is 59 y.o.,  female, moderately obese, nourished, conscious, alert. Does not appear to be distress or pain at this time. SKIN:  Warm, dry, no cyanosis or jaundice. HEAD:  Normocephalic, atraumatic, no swelling or tenderness. EYES:  Pupils equal, reactive to light, Conjunctiva is clear, EOMs intact becka. eyelids WNL. EARS:  No discharge, Pt has moderate hearing loss. NOSE:  No rhinorrhea, epistaxis or septal deformity. THROAT:  Not congested. No ulceration bleeding or discharge. NECK:  No stiffness, trachea central.  No palpable masses or L.N.      CHEST:  Symmetrical and equal on expansion. HEART:  Regular rate and rhythm. S1 > S2, No audible murmurs or gallops. LUNGS:  Equal on expansion, normal breath sounds. No adventitious sounds. ABDOMEN:  Obese. Soft on palpation. Ileostomy in place, No localized tenderness. No guarding or rigidity. No palpable organomegaly. LYMPHATICS:  No palpable cervical Lymphadenopathy.      LOCOMOTOR, BACK AND SPINE:  No tenderness or deformities. EXTREMITIES:  Symmetrical, no pretibial edema. Todds sign negative. No discoloration or ulcerations. NEUROLOGIC:  The patient is conscious, alert, oriented,Cranial nerve II-XII intact, taste was not examined. No apparent focal sensory or motor deficits. Muscle strength equal Manny. No facial droop, tongue protrudes centrally, no slurring of the speech. PROVISIONAL DIAGNOSES:      Active Problems:    Severe major depressive disorder (HCC)    Major depression, chronic    Hydronephrosis  Resolved Problems:    * No resolved hospital problems.  *    DORIE DE LA GARZA PA-C on 4/3/2019 at 5:02 PM

## 2019-04-03 NOTE — GROUP NOTE
Group Therapy Note    Date: April 3    Group Start Time: 1600  Group End Time: 7622  Group Topic: Recovery    [facility]      Group Therapy Note       Pt. Did not attend despite invitation    Signature:   Parveen Acharya

## 2019-04-03 NOTE — CARE COORDINATION
PSYCHOTHERAPY GROUP NOTE       Date:  4/3/2019                Start Time:     10:00am                    End Time:    10:45am      Number Participants in Group:  8/18      Goals:  To participate in group psychotherapy and remain in the here-and-now        RT X SW  Nsg  LPN   BHTII  Other       Participation Level:     None  Minimal   X Active Listener X Interactive    Monopolizing         Participation Quality:  X Appropriate  Inappropriate   X  Attentive   Intrusive   X  Sharing   Resistant   X  Supportive    Lethargic       Affective:    Congruent  Incongruent  Blunted  Flat    Constricted X Anxious  Elated  Angry    Labile X Depressed  Other         Cognitive:  X Alert  Oriented PPTS     Concentration G X F  P    Attention Span G X F  P    Short-Term Memory G  F X P    Long-Term Memory G  F X P    ProblemSolving/  Decision Making G X F  P    Ability to Process  Information G X F  P       Contributing Factors             Delusional             Hallucinating             Flight of Ideas             Other: poor concentration       Modes of Intervention:   Education X Support  Exploration    Clarifying X Problem Solving  Confrontation    Socialization  Limit Setting  Reality Testing    Activity  Movement  Media    Other:          Response to Learning:  X Able to verbalize current knowledge/experience   X Able to verbalize/acknowledge new learning    Able to retain information    Capable of insight    Able to change behavior   X Progressing to goal    Other:        Comments: PT participates in group psychotherapy

## 2019-04-04 LAB
AFP: 5.1 UG/L
ALBUMIN SERPL-MCNC: 3.3 G/DL (ref 3.5–5.2)
ALBUMIN/GLOBULIN RATIO: ABNORMAL (ref 1–2.5)
ALP BLD-CCNC: 77 U/L (ref 35–104)
ALT SERPL-CCNC: 80 U/L (ref 5–33)
AST SERPL-CCNC: 47 U/L
BILIRUB SERPL-MCNC: 0.4 MG/DL (ref 0.3–1.2)
BILIRUBIN DIRECT: 0.13 MG/DL
BILIRUBIN, INDIRECT: 0.27 MG/DL (ref 0–1)
CHOLESTEROL/HDL RATIO: 2.6
CHOLESTEROL: 136 MG/DL
ESTIMATED AVERAGE GLUCOSE: 97 MG/DL
GLOBULIN: ABNORMAL G/DL (ref 1.5–3.8)
HAV IGM SER IA-ACNC: NONREACTIVE
HBA1C MFR BLD: 5 % (ref 4–6)
HDLC SERPL-MCNC: 53 MG/DL
HEPATITIS B CORE IGM ANTIBODY: NONREACTIVE
HEPATITIS B SURFACE ANTIGEN: NONREACTIVE
HEPATITIS C ANTIBODY: REACTIVE
LDL CHOLESTEROL: 65 MG/DL (ref 0–130)
THYROXINE, FREE: 1.04 NG/DL (ref 0.93–1.7)
TOTAL PROTEIN: 6.2 G/DL (ref 6.4–8.3)
TRIGL SERPL-MCNC: 91 MG/DL
TSH SERPL DL<=0.05 MIU/L-ACNC: 2.57 MIU/L (ref 0.3–5)
VLDLC SERPL CALC-MCNC: NORMAL MG/DL (ref 1–30)

## 2019-04-04 PROCEDURE — 87522 HEPATITIS C REVRS TRNSCRPJ: CPT

## 2019-04-04 PROCEDURE — 1240000000 HC EMOTIONAL WELLNESS R&B

## 2019-04-04 PROCEDURE — 6370000000 HC RX 637 (ALT 250 FOR IP): Performed by: NURSE PRACTITIONER

## 2019-04-04 PROCEDURE — 83036 HEMOGLOBIN GLYCOSYLATED A1C: CPT

## 2019-04-04 PROCEDURE — 80061 LIPID PANEL: CPT

## 2019-04-04 PROCEDURE — 84439 ASSAY OF FREE THYROXINE: CPT

## 2019-04-04 PROCEDURE — 36415 COLL VENOUS BLD VENIPUNCTURE: CPT

## 2019-04-04 PROCEDURE — 84443 ASSAY THYROID STIM HORMONE: CPT

## 2019-04-04 PROCEDURE — 99232 SBSQ HOSP IP/OBS MODERATE 35: CPT | Performed by: NURSE PRACTITIONER

## 2019-04-04 PROCEDURE — 99231 SBSQ HOSP IP/OBS SF/LOW 25: CPT | Performed by: INTERNAL MEDICINE

## 2019-04-04 PROCEDURE — 80076 HEPATIC FUNCTION PANEL: CPT

## 2019-04-04 PROCEDURE — 82105 ALPHA-FETOPROTEIN SERUM: CPT

## 2019-04-04 PROCEDURE — 80074 ACUTE HEPATITIS PANEL: CPT

## 2019-04-04 RX ADMIN — DIPHENHYDRAMINE HCL 25 MG: 25 TABLET ORAL at 21:01

## 2019-04-04 RX ADMIN — CLONIDINE HYDROCHLORIDE 0.1 MG: 0.1 TABLET ORAL at 14:48

## 2019-04-04 RX ADMIN — HYDROXYZINE HYDROCHLORIDE 25 MG: 25 TABLET, FILM COATED ORAL at 19:07

## 2019-04-04 RX ADMIN — CLONIDINE HYDROCHLORIDE 0.1 MG: 0.1 TABLET ORAL at 08:32

## 2019-04-04 RX ADMIN — PROMETHAZINE HYDROCHLORIDE 25 MG: 25 TABLET ORAL at 11:13

## 2019-04-04 RX ADMIN — TAMSULOSIN HYDROCHLORIDE 0.4 MG: 0.4 CAPSULE ORAL at 08:31

## 2019-04-04 RX ADMIN — HYDROXYZINE HYDROCHLORIDE 25 MG: 25 TABLET, FILM COATED ORAL at 08:32

## 2019-04-04 RX ADMIN — SERTRALINE HYDROCHLORIDE 25 MG: 25 TABLET ORAL at 08:32

## 2019-04-04 RX ADMIN — IBUPROFEN 800 MG: 800 TABLET ORAL at 19:07

## 2019-04-04 RX ADMIN — DICYCLOMINE HYDROCHLORIDE 20 MG: 20 TABLET ORAL at 11:13

## 2019-04-04 RX ADMIN — CYCLOBENZAPRINE HYDROCHLORIDE 10 MG: 10 TABLET, FILM COATED ORAL at 21:01

## 2019-04-04 RX ADMIN — TRAZODONE HYDROCHLORIDE 50 MG: 50 TABLET ORAL at 21:01

## 2019-04-04 RX ADMIN — CLONIDINE HYDROCHLORIDE 0.1 MG: 0.1 TABLET ORAL at 21:01

## 2019-04-04 ASSESSMENT — PAIN SCALES - GENERAL: PAINLEVEL_OUTOF10: 4

## 2019-04-04 ASSESSMENT — PAIN - FUNCTIONAL ASSESSMENT: PAIN_FUNCTIONAL_ASSESSMENT: 0-10

## 2019-04-04 NOTE — GROUP NOTE
Group Therapy Note    Date: April 4    Group Start Time: 1600  Group End Time: 1700  Group Topic: Recovery    RIMMA Cuevas    Pt did not participate in Recovery group at 1600 due to refusal despite encouragement.

## 2019-04-04 NOTE — GROUP NOTE
Group Therapy Note    Date: April 4    Group Start Time: 1100  Group End Time: 1140  Group Topic: Recreational    [facility]    Kevin Beavers, CTRS      Group Therapy Note    Pt did not participate in Leisure Skills Recreation Group at 1100 despite staff encouragement.        Signature:  Roxann Brower

## 2019-04-04 NOTE — PLAN OF CARE
No  Memory:Normal: Yes  Memory: Poor Recent  Insight and Judgment: No  Insight and Judgment: Poor Judgment, Poor Insight  Present Suicidal Ideation: No  Present Homicidal Ideation: No    Daily Assessment Last Entry:   Daily Sleep (WDL): Within Defined Limits         Patient Currently in Pain: Denies  Daily Nutrition (WDL): Within Defined Limits    Patient Monitoring:  Frequency of Checks: 4 times per hour, close    Psychiatric Symptoms:   Depression Symptoms  Depression Symptoms: Change in energy level, Isolative, Feelings of helplessness, Feelings of hopelessess, Loss of interest, Sleep disturbance  Anxiety Symptoms  Anxiety Symptoms: Generalized  Betina Symptoms  Betina Symptoms: No problems reported or observed. Psychosis Symptoms  Delusion Type: (Denies)    Suicide Risk CSSR-S:  1) Within the past month, have you wished you were dead or wished you could go to sleep and not wake up? : Yes  2) Have you actually had any thoughts of killing yourself? : Yes  3) Have you been thinking about how you might kill yourself? : Yes  5) Have you started to work out or worked out the details of how to kill yourself?  Do you intend to carry out this plan? : No  6) Have you ever done anything, started to do anything, or prepared to do anything to end your life?: No  Change in Result:  no Change in Plan of care:  no      EDUCATION:   Learner Progress Toward Treatment Goals:   Reviewed results and recommendations of this team, Reviewed group plan and strategies, Reviewed signs, symptoms and risk of self harm and violent behavior, Reviewed goals and plan of care    Method:  small group, individual verbal education    Outcome:   Verbalized by patient but needs reinforcement to obtain goals    PATIENT GOALS:  Short term:  Decrease depression, stable mood, decrease anxiety   Long term:  Volunteer in the community, stable mood    PLAN/TREATMENT RECOMMENDATIONS UPDATE:  continue with group therapies, increased socialization, continue planning for after discharge goals, continue with medication compliance    SHORT-TERM GOALS UPDATE:   Time frame for Short-Term Goals:  5-7 days    LONG-TERM GOALS UPDATE:   Time frame for Long-Term Goals:  6 months    Members Present in Team Meeting:     Eloy Humphreys

## 2019-04-04 NOTE — BH NOTE
Pt was seen by Dr. Harley Pineda, who had requested a consult to urology.  Writer notified Dr. Rebecca Mtahew Who called and asked for CT report and lab results, stated that unless patient develops a fever or pain returns , they will see her in the office

## 2019-04-04 NOTE — PROGRESS NOTES
[facility] Internal Medicine    CONSULTATION / HISTORY AND PHYSICAL EXAMINATION            Date:   4/4/2019  Patient name:  Raquel Carreno  Date of admission:  4/2/2019  1:38 PM  MRN:   [id number]  Account:  [de-identified]  YOB: 1960  PCP:    No primary care provider on file. Room:   [address] [place] [address]  Code Status:    Full Code    Physician Requesting Consult: Jorge Fisher MD    Reason for Consult: Elevated liver enzymes,, left-sided hydronephrosis    Chief Complaint:     Chief Complaint   Patient presents with    Leg Pain     left    Mental Health Problem    Depression    Suicidal       History Obtained From:     patient, electronic medical record    History of Present Illness:   Patient is admitted for major depression  She has multiple medical problems which includes hypertension, hypothyroidism, history of colon cancer 13 years ago status post ileostomy, chemoradiation. Patient is not taking any medication for her thyroid. She was found positive for cannabinoids and cocaine, her liver enzymes were high. There is no previous liver enzymes to compare with  Patient is also complaining of abdominal pain, mainly located on left side of her abdomen. She had CT scan of abdomen suggestive of left-sided ureteric stone, with hydronephrosis  Abdominal pain is going on for last 2 weeks, she also is no noticed blood in the urine. She never diagnosed with renal stones in the past.      Past Medical History:     Past Medical History:   Diagnosis Date    Cancer ([address]     Hypertension     Thyroid disease         Past Surgical History:     Past Surgical History:   Procedure Laterality Date    HYSTERECTOMY      JOINT REPLACEMENT          Medications Prior to Admission:     Prior to Admission medications    Not on File        Allergies:     Clindamycin/lincomycin; Demerol hcl [meperidine];  Flagyl [metronidazole]; and Pcn [penicillins]    Social History:     Tobacco: reports that she has never smoked. She has never used smokeless tobacco.  Alcohol:      reports that she drank alcohol. Drug Use:  reports that she has current or past drug history. Drugs: Cocaine and Marijuana. Family History:     Family History   Problem Relation Age of Onset    Alcohol Abuse Father     Coronary Art Dis Father          of MI at age 52    Hypertension Brother     Cancer Brother         Kidney       Review of Systems:     Positive and Negative as described in HPI. CONSTITUTIONAL:  negative for fevers, chills, sweats, fatigue, weight loss  HEENT:  negative for vision, hearing changes, runny nose, throat pain  RESPIRATORY:  negative for shortness of breath, cough, congestion, wheezing. CARDIOVASCULAR:  negative for chest pain, palpitations. GASTROINTESTINAL:  Positive for abdominal pain   GENITOURINARY:  negative for difficulty of urination, burning with urination, frequency   INTEGUMENT:  negative for rash, skin lesions, easy bruising   HEMATOLOGIC/LYMPHATIC:  negative for swelling/edema   ALLERGIC/IMMUNOLOGIC:  negative for urticaria , itching  ENDOCRINE:  negative increase in drinking, increase in urination, hot or cold intolerance  MUSCULOSKELETAL:  negative joint pains, muscle aches, swelling of joints  NEUROLOGICAL:  negative for headaches, dizziness, lightheadedness, numbness, pain, tingling extremities  BEHAVIOR/PSYCH:  negative for depression, anxiety    Physical Exam:     /67   Pulse 63   Temp 98.1 °F (36.7 °C) (Oral)   Resp 12   Ht 5' 2\" (1.575 m)   Wt 180 lb (81.6 kg)   LMP  (Exact Date)   SpO2 100%   Breastfeeding? No   BMI 32.92 kg/m²   Temp (24hrs), Av.4 °F (36.9 °C), Min:98.1 °F (36.7 °C), Max:98.6 °F (37 °C)    No results for input(s): POCGLU in the last 72 hours.   No intake or output data in the 24 hours ending 19 1651    General Appearance:  alert, well appearing, and in no acute distress  Mental status: oriented to person, place, and time 0.13 <0.31 mg/dL    Bilirubin, Indirect 0.27 0.00 - 1.00 mg/dL    Total Protein 6.2 (L) 6.4 - 8.3 g/dL    Globulin NOT REPORTED 1.5 - 3.8 g/dL    Albumin/Globulin Ratio NOT REPORTED 1.0 - 2.5   Hepatitis Acute Steven    Collection Time: 19  6:26 AM   Result Value Ref Range    Hepatitis B Surface Ag NONREACTIVE NONREACTIVE    Hepatitis C Ab REACTIVE (A) NONREACTIVE    Hep B Core Ab, IgM NONREACTIVE NONREACTIVE    Hep A IgM NONREACTIVE NONREACTIVE       Imaging/Diagonstics:  [address] Transitions 24 Hour Follow Up Call    2019    Patient: Nae Hassan Patient : 1960    MRN: [id number]  Reason for Admission: No discharge information exists for this patient. Discharge Date:   RARS:  [facility] with: Facility: [unfilled]    Non-face-to-face services provided:      Inpatient Assessment  Care Transitions 24 Hour Call    Care Transitions Interventions         Follow Up  No future appointments. Apurva Moore MD  CT-scan of the abdomen    Assessment :      Primary Problem  Severe major depressive disorder [facility])    Active Hospital Problems    Diagnosis Date Noted    Hydronephrosis [N13.30]     Severe major depressive disorder ([facility] [address] [F32.2] 2019    Major depression, chronic [F34.1] 2019       Plan:     1. Elevated liver enzyme, patient refusing alcohol abuse, likely secondary to polysubstance abuse, we will repeat liver function test, acute hepatitis panel  2 . ureteric stone with left-sided hydronephrosis, concentric urology,  3 . Patient Tyro Pack history of hypothyroidism, not taking any medication, awaiting results of thyroid function test  4 . Major depression, psychiatry following  5 colon cancer status post surgery, history of chemoradiation  6 . Cocaine abuse  7 . Marijuana abuse.        Patient is doing much better  Urology input awaited , discussed with RN  Hepatitis C antibodies positive, ordering hepatitis C quantitative RNA PCR, AFP  Patient mentioned that her ex-bed partner was positive for hepatitis C  Her abdominal pain has much improved  TSH is normal  Consultations:   Alirio Freed HOSPITALIST  IP CONSULT TO INTERNAL MEDICINE  IP CONSULT TO UROLOGY      Sriram Guzman MD  4/4/2019  4:51 PM    Copy sent to Dr. Kandi Phillips primary care provider on file. Please note that this chart was generated using voice recognition Dragon dictation software. Although every effort was made to ensure the accuracy of this automated transcription, some errors in transcription may have occurred.

## 2019-04-04 NOTE — GROUP NOTE
Group Therapy Note    Date: April 4    Group Start Time: 1000  Group End Time: 2556  Group Topic: Psychotherapy    [facility]    [employer]      Pt declined to attend psychotherapy at 1000 am despite encouragement . PT was offered 1:1 and declined on this date. Signature:   Naveen Javier

## 2019-04-04 NOTE — PROGRESS NOTES
Department of Psychiatry  Attending Progress Note  Chief Complaint: Severe major depressive disorder ([facility] [address]     SUBJECTIVE:  Radha Jiménez is seen in her room today. She is still quite depressed, despondent, overwhelmed, and having frequent suicidal thoughts. Affect remains flat and poorly reactive. Patient has been withdrawn and isolative. Patient complains of high level of racing thoughts driving feelings of anxiety. Feeling hopeless and helpless. Denies any side effects to medications. Patient is experiencing some withdrawal symptoms including increased anxiety, tremors, and nausea. Explored her  concerns and support provided. There is no identifiable safe alternative other than continued hospitalization. Charting and medications reviewed. OBJECTIVE    Physical  /66   Pulse 63   Temp 98.1 °F (36.7 °C) (Oral)   Resp 12   Ht 5' 2\" (1.575 m)   Wt 180 lb (81.6 kg)   LMP  (Exact Date)   SpO2 100%   Breastfeeding? No   BMI 32.92 kg/m²      Mental Status Evaluation:  Orientation: alertness: alert   Mood:. anxious and depressed      Affect:  flat      Appearance:  disheveled.    Activity:  Within Normal Limits   Gait/Posture: Normal   Speech:  normal pitch and normal volume   Thought Process:  circumstantial   Thought Content:  suicidal   Sensorium:  person, place, time/date and situation   Cognition:  grossly intact   Memory: intact   Insight:  limited   Judgment: limited   Suicidal Ideations: Less intense and frequent   Homicidal Ideations: Negative for homicidal ideation      Medication Side Effects: absent       Attention Span attention span and concentration were age appropriate     Medications  Current Facility-Administered Medications   Medication Dose Route Frequency Provider Last Rate Last Dose    sertraline (ZOLOFT) tablet 25 mg  25 mg Oral Daily TERRI Rubi CNP   25 mg at 04/04/19 8020    cloNIDine (CATAPRES) tablet 0.1 mg  0.1 mg Oral TID TERRI Rubi - CNP   0.1 mg at 04/04/19 2129    cyclobenzaprine (FLEXERIL) tablet 10 mg  10 mg Oral TID PRN Arty Puposky, APRN - CNP   10 mg at 04/03/19 1751    promethazine (PHENERGAN) injection 12.5 mg  12.5 mg Intramuscular Q4H PRN Arty Puposky, APRN - CNP        Or    promethazine (PHENERGAN) tablet 25 mg  25 mg Oral Q4H PRN Arty Puposky, APRN - CNP        dicyclomine (BENTYL) tablet 20 mg  20 mg Oral 4x Daily PRN Arty Puposky, APRN - CNP   20 mg at 04/03/19 1752    diphenhydrAMINE (BENADRYL) tablet 25 mg  25 mg Oral Nightly PRN Arty Puposky, APRN - CNP        acetaminophen (TYLENOL) tablet 650 mg  650 mg Oral Q4H PRN Arty Puposky, APRN - CNP        hydrOXYzine (ATARAX) tablet 25 mg  25 mg Oral TID PRN Arty Puposky, APRN - CNP   25 mg at 04/04/19 2834    traZODone (DESYREL) tablet 50 mg  50 mg Oral Nightly PRN Arty Puposky, APRN - CNP   50 mg at 04/03/19 2050    benztropine mesylate (COGENTIN) injection 2 mg  2 mg Intramuscular BID PRN Arty Puposky, APRN - CNP        magnesium hydroxide (MILK OF MAGNESIA) 400 MG/5ML suspension 30 mL  30 mL Oral Daily PRN Arty Puposky, APRN - CNP        aluminum & magnesium hydroxide-simethicone (MAALOX) 200-200-20 MG/5ML suspension 30 mL  30 mL Oral Q6H PRN Arty Puposky, APRN - CNP        nicotine polacrilex (NICORETTE) gum 2 mg  2 mg Oral Q2H PRN Arty Puposky, APRN - CNP        haloperidol lactate (HALDOL) injection 5 mg  5 mg Intramuscular Q4H PRN Arty Puposky, APRN - CNP        ibuprofen (ADVIL;MOTRIN) tablet 800 mg  800 mg Oral TID PRN Arty Puposky, APRN - CNP        tamsulosin (FLOMAX) capsule 0.4 mg  0.4 mg Oral Daily Arty Puposky, APRN - CNP   0.4 mg at 04/04/19 0831    miconazole (MICOTIN) 2 % cream   Topical BID TERRI Whitlock - CNP             sertraline  25 mg Oral Daily    cloNIDine  0.1 mg Oral TID    tamsulosin  0.4 mg Oral Daily    miconazole   Topical BID       ASSESSMENT  Severe major depressive disorder ([address]     Patient's Response to Treatment: positive    PLAN:     1.  Admit to inpatient psychiatric treatment  2. Supportive therapy with medication management. Reviewed risks and benefits as well as potential side effects with patient. 3. Therapeutic activities and groups  4. Follow up at [facility] after symptoms stabilized. 5. Consult Internal Medicine for elevated liver enzymes and medical management  6. Start Zoloft and titrate for efficacy. Monitor for withdrawal.  7. Social work for discharge planning.                Electronically signed by TERRI Dorantes CNP on 4/4/2019 at 10:41 AM.    Dragon voice recognition software used in portions of this document.

## 2019-04-04 NOTE — PLAN OF CARE
Problem: Falls - Risk of:  Goal: Will remain free from falls  Description  Will remain free from falls  Outcome: Ongoing  Note:   Pt remains free of falls and verbalizes understanding of individual fall risks. Pt wearing non skid footwear and encouraged to seek out staff for any assistance needed. Problem: Altered Mood, Depressive Behavior:  Goal: Able to verbalize and/or display a decrease in depressive symptoms  Description  Able to verbalize and/or display a decrease in depressive symptoms  Outcome: Ongoing  Note:   Patient denies suicidal ideation at this time. Depressed, anxious, flat. Out in the milieu at times, social with select peers. Compliant with all prescribed medications during this shift. Safety checks maintained q15min and irregular rounding. Goal: Ability to disclose and discuss suicidal ideas will improve  Description  Ability to disclose and discuss suicidal ideas will improve  Outcome: Ongoing  Note:   Pt denies thoughts of self harm and is agreeable to seeking out should thoughts of self harm arise. Safe environment maintained. Q15 minute checks for safety cont per unit policy. Will cont to monitor for safety and provides support and reassurance as needed.

## 2019-04-04 NOTE — PLAN OF CARE
Problem: Falls - Risk of:  Goal: Absence of physical injury  Description  Absence of physical injury  4/4/2019 1438 by Rich Barrett  Outcome: Ongoing  Note:   Pt remains free of falls and verbalizes understanding of individual fall risks. Pt wearing non skid footwear and encouraged to seek out staff for any assistance needed. Q 15 minute checks for safety continued. Problem: Altered Mood, Depressive Behavior:  Goal: Ability to disclose and discuss suicidal ideas will improve  Description  Ability to disclose and discuss suicidal ideas will improve  4/4/2019 1438 by Rich Barrett  Outcome: Ongoing  Note:   Pt denies any current suicidal ideations upon request this morning but complains of racing thoughts, and feeling overwhelmed with substance abuse. Complains of high depression and anxiety related to not having her own housing and not seeing her children as much as she would like. Reports she has been eating well but not sleeping so spends long intervals resting in bed. Support and reassurance given. Encouraged to attend unit programing and take medications as prescribed. Agrees to seek out staff as needed and before harming self if negative self harm thoughts arise. Q15 minute checks for safety cont.

## 2019-04-05 PROCEDURE — 99232 SBSQ HOSP IP/OBS MODERATE 35: CPT | Performed by: NURSE PRACTITIONER

## 2019-04-05 PROCEDURE — 99231 SBSQ HOSP IP/OBS SF/LOW 25: CPT | Performed by: INTERNAL MEDICINE

## 2019-04-05 PROCEDURE — 1240000000 HC EMOTIONAL WELLNESS R&B

## 2019-04-05 PROCEDURE — 6370000000 HC RX 637 (ALT 250 FOR IP): Performed by: NURSE PRACTITIONER

## 2019-04-05 RX ORDER — ONDANSETRON 4 MG/1
4 TABLET, FILM COATED ORAL EVERY 8 HOURS PRN
Status: DISCONTINUED | OUTPATIENT
Start: 2019-04-05 | End: 2019-04-16 | Stop reason: HOSPADM

## 2019-04-05 RX ADMIN — CLONIDINE HYDROCHLORIDE 0.1 MG: 0.1 TABLET ORAL at 08:47

## 2019-04-05 RX ADMIN — TAMSULOSIN HYDROCHLORIDE 0.4 MG: 0.4 CAPSULE ORAL at 08:47

## 2019-04-05 RX ADMIN — TRAZODONE HYDROCHLORIDE 50 MG: 50 TABLET ORAL at 20:53

## 2019-04-05 RX ADMIN — SERTRALINE HYDROCHLORIDE 25 MG: 25 TABLET ORAL at 08:49

## 2019-04-05 RX ADMIN — ACETAMINOPHEN 650 MG: 325 TABLET, FILM COATED ORAL at 15:39

## 2019-04-05 RX ADMIN — DIPHENHYDRAMINE HCL 25 MG: 25 TABLET ORAL at 20:53

## 2019-04-05 RX ADMIN — HYDROXYZINE HYDROCHLORIDE 25 MG: 25 TABLET, FILM COATED ORAL at 14:23

## 2019-04-05 ASSESSMENT — PAIN SCALES - GENERAL
PAINLEVEL_OUTOF10: 4
PAINLEVEL_OUTOF10: 0

## 2019-04-05 NOTE — CONSULTS
Department of Urology  Urology Consult Note    Patient:  Beulah Mullins  MRN: [id number]  YOB: 1960    Reason for Consult:  left ureteral stone  Requesting Physician:  Dr. Leonardo Chanel:    Chief Complaint   Patient presents with    Leg Pain     left    Mental Health Problem    Depression    Suicidal       History Obtained From:   patient    HISTORY OF PRESENT ILLNESS:    The patient is a 59 y.o. female with significant past medical history of psychiatric illness, admitted to [facility], who complained of left flank pain yesterday. Was found to have a left distal ureteral stone. First stone. No fevers. No bothersome LUTS. No significant urological history. Pain has been controlled.      Past Medical History:        Diagnosis Date    Cancer ([facility] [address]     Hypertension     Thyroid disease      Past Surgical History:        Procedure Laterality Date    HYSTERECTOMY      JOINT REPLACEMENT       Current Medications:   Current Facility-Administered Medications: sertraline (ZOLOFT) tablet 25 mg, 25 mg, Oral, Daily  cloNIDine (CATAPRES) tablet 0.1 mg, 0.1 mg, Oral, TID  cyclobenzaprine (FLEXERIL) tablet 10 mg, 10 mg, Oral, TID PRN  promethazine (PHENERGAN) injection 12.5 mg, 12.5 mg, Intramuscular, Q4H PRN **OR** promethazine (PHENERGAN) tablet 25 mg, 25 mg, Oral, Q4H PRN  dicyclomine (BENTYL) tablet 20 mg, 20 mg, Oral, 4x Daily PRN  diphenhydrAMINE (BENADRYL) tablet 25 mg, 25 mg, Oral, Nightly PRN  acetaminophen (TYLENOL) tablet 650 mg, 650 mg, Oral, Q4H PRN  hydrOXYzine (ATARAX) tablet 25 mg, 25 mg, Oral, TID PRN  traZODone (DESYREL) tablet 50 mg, 50 mg, Oral, Nightly PRN  benztropine mesylate (COGENTIN) injection 2 mg, 2 mg, Intramuscular, BID PRN  magnesium hydroxide (MILK OF MAGNESIA) 400 MG/5ML suspension 30 mL, 30 mL, Oral, Daily PRN  aluminum & magnesium hydroxide-simethicone (MAALOX) 200-200-20 MG/5ML suspension 30 mL, 30 mL, Oral, Q6H PRN  nicotine polacrilex (NICORETTE) gum 2 mg, 2 mg, Oral, Q2H PRN  haloperidol lactate (HALDOL) injection 5 mg, 5 mg, Intramuscular, Q4H PRN  ibuprofen (ADVIL;MOTRIN) tablet 800 mg, 800 mg, Oral, TID PRN  tamsulosin (FLOMAX) capsule 0.4 mg, 0.4 mg, Oral, Daily  miconazole (MICOTIN) 2 % cream, , Topical, BID    Allergies: ALG@    Social History:   Social History     Socioeconomic History    Marital status:      Spouse name: Not on file    Number of children: Not on file    Years of education: Not on file    Highest education level: Not on file   Occupational History    Not on file   Social Needs    Financial resource strain: Not on file    Food insecurity:     Worry: Not on file     Inability: Not on file    Transportation needs:     Medical: Not on file     Non-medical: Not on file   Tobacco Use    Smoking status: Never Smoker    Smokeless tobacco: Never Used   Substance and Sexual Activity    Alcohol use: Not Currently    Drug use: Yes     Types: Cocaine, Marijuana     Comment: Crack Cocaine (smokes) for past couple years    Sexual activity: Not Currently   Lifestyle    Physical activity:     Days per week: Not on file     Minutes per session: Not on file    Stress: Not on file   Relationships    Social connections:     Talks on phone: Not on file     Gets together: Not on file     Attends Nondenominational service: Not on file     Active member of club or organization: Not on file     Attends meetings of clubs or organizations: Not on file     Relationship status: Not on file    Intimate partner violence:     Fear of current or ex partner: Not on file     Emotionally abused: Not on file     Physically abused: Not on file     Forced sexual activity: Not on file   Other Topics Concern    Not on file   Social History Narrative    Not on file       Family History:       Problem Relation Age of Onset    Alcohol Abuse Father     Coronary Art Dis Father          of MI at age 52    Hypertension Brother     Cancer Brother         Kidney Review of Systems:  Constitutional: Negative for fever, chills and activity change. Eyes: Negative for pain, redness and visual disturbance. Respiratory: Negative for cough, shortness of breath and wheezing. Cardiovascular: Negative for chest pain and leg swelling. Gastrointestinal: Negative for nausea, vomiting and abdominal pain. Endocrine: Negative for polydipsia and polyphagia. Genitourinary: Negative for dysuria, frequency, hematuria, flank pain and difficulty urinating. Musculoskeletal: Negative for myalgias, back pain and joint swelling. Skin: Negative for color change, rash and wound. Allergic/Immunologic: Negative for environmental allergies and food allergies. Neurological: Negative for dizziness, tremors and numbness. Hematological: Negative for adenopathy. Does not bruise/bleed easily. Psychiatric/Behavioral: Negative for confusion and dysphoric mood. The patient is not nervous/anxious. Patient Vitals for the past 24 hrs:   BP Temp Temp src Pulse Resp   04/04/19 1904 (!) 116/93 99 °F (37.2 °C) Oral 81 14   04/04/19 1448 111/67 -- -- -- --     No intake or output data in the 24 hours ending 04/05/19 0804    Recent Labs     04/02/19  1434   WBC 8.1   HGB 14.4   HCT 43.6   MCV 83.5        Recent Labs     04/02/19  1434      K 4.5      CO2 23   BUN 17   CREATININE 0.73       Recent Labs     04/02/19  1425   COLORU YELLOW   PHUR 5.0   SPECGRAV 1.007   LEUKOCYTESUR NEGATIVE   UROBILINOGEN Normal   BILIRUBINUR NEGATIVE       Additional Lab/culture results:    Physical Exam:  Constitutional: Patient in no acute distress; Neuro: alert and oriented to person place and time. Psych: Mood and affect normal.  Skin: Normal  Lungs: Respiratory effort normal  Cardiovascular:  Normal peripheral pulses  Abdomen: Soft, non-tender, non-distended with no CVA, flank pain, hepatosplenomegaly or hernia.   Kidneys normal.  Bladder non-tender and not distended. Lymphatics: no palpable lymphadenopathy      Interval Imaging Findings:   Ct Abdomen Pelvis Wo Contrast    Result Date: 4/4/2019  EXAMINATION: CT OF THE ABDOMEN AND PELVIS WITHOUT CONTRAST 4/2/2019 2:16 pm TECHNIQUE: CT of the abdomen and pelvis was performed without the administration of intravenous contrast. Multiplanar reformatted images are provided for review. Dose modulation, iterative reconstruction, and/or weight based adjustment of the mA/kV was utilized to reduce the radiation dose to as low as reasonably achievable. COMPARISON: None HISTORY: ORDERING SYSTEM PROVIDED HISTORY: flank pain TECHNOLOGIST PROVIDED HISTORY: Ordering Physician Provided Reason for Exam: lt side flank ellie x 10 days Acuity: Unknown Type of Exam: Unknown Relevant Medical/Surgical History: mult abd surgeries, hx colon ca FINDINGS: Lower Chest: Lung bases are clear. Heart is not enlarged. No pericardial effusion. Organs: No suspicious hepatic lesions. Lobulated fluid density lesion in hepatic segment 6 is most likely a cyst and requires no additional imaging follow-up. Cholecystectomy clips. Normal spleen, pancreas, adrenal glands, and right kidney. There is left-sided hydronephrosis. A surgical clip is in close proximity to the left ureter and there is a possible 2-3 mm nonobstructing calculus in the distal left ureter (series 2, image 130. GI/Bowel: Postoperative changes from bowel resection with anastomosis. No dilated bowel. A right lower quadrant colostomy is present. Subtotal colectomy evident. There is no evidence of mass. Minimal hiatal hernia. Pelvis: Absent uterus. Bladder is normal for degree of distention. Peritoneum/Retroperitoneum: Atherosclerotic changes of aorta. No lymphadenopathy. No free fluid. No free air. Bones/Soft Tissues: Atrophy of the rectus musculature. Right lower quadrant colostomy. Mild pelvic muscle atrophy. No fluid collections in the abdominal wall.   Multilevel degenerative changes with endplate irregularity of L2 suspicious of age-indeterminate compression deformity. 1. Left-sided hydronephrosis with associated 2-3 mm distal left ureteral calculus. 2. Status post colectomy with right lower quadrant colostomy. 3. Minimal hiatal hernia. Vl Dup Lower Extremity Venous Left    Result Date: 4/2/2019    [facility]  Vascular Lower Extremities DVT Study Procedure   Patient Name   Vicky Pack  Date of Study           04/02/2019                 R   Date of Birth  1960  Gender                  Female   Age            59 year(s)  Race                       Room Number    0222   Corporate ID # [id number]   Patient Acct # [de-identified]   MR #           [id number]      Sonographer             Citlali Fagan RVT   Accession #    [id number]   Interpreting Physician  Karen Schultz   Referring                  Referring Physician     Charito Vasquez CNP  Nurse  Practitioner  Procedure Type of Study:   Veins: Lower Extremities DVT Study, Venous Scan Lower Left. Indications for Study:Swelling. Patient Status:ER. Technical Quality:Adequate visualization. Conclusions   Summary   No evidence of superficial or deep venous thrombosis in the left lower  extremity and right common femoral vein.    Signature   ----------------------------------------------------------------  Electronically signed by Citlali Fagan RVT(Sonographer) on  04/02/2019 05:19 PM  ----------------------------------------------------------------   ----------------------------------------------------------------  Electronically signed by Lukasz ShahInterpreting  physician) on 04/02/2019 08:45 PM  ----------------------------------------------------------------  Findings:   Right Impression:                Left Impression:  The common femoral vein          The common femoral, femoral, popliteal  demonstrates normal              and tibial veins demonstrate normal  compressibility and compressibility and augmentation. augmentation. Normal compressibility of the great                                   saphenous vein. Normal compressibility of the small                                   saphenous vein. Velocities are measured in cm/s ; Diameters are measured in cm Right Lower Extremities DVT Study Measurements Right 2D Measurements +------------------------------------+----------+---------------+----------+ ! Location                            ! Visualized! Compressibility! Thrombosis! +------------------------------------+----------+---------------+----------+ ! Common Femoral                      !Yes       ! Yes            ! None      ! +------------------------------------+----------+---------------+----------+ Left Lower Extremities DVT Study Measurements Left 2D Measurements +------------------------------------+----------+---------------+----------+ ! Location                            ! Visualized! Compressibility! Thrombosis! +------------------------------------+----------+---------------+----------+ ! Common Femoral                      !Yes       ! Yes            ! None      ! +------------------------------------+----------+---------------+----------+ ! Prox Femoral                        !Yes       ! Yes            ! None      ! +------------------------------------+----------+---------------+----------+ ! Mid Femoral                         !Yes       ! Yes            ! None      ! +------------------------------------+----------+---------------+----------+ ! Dist Femoral                        !Yes       ! Yes            ! None      ! +------------------------------------+----------+---------------+----------+ ! Deep Femoral                        !Yes       ! Yes            ! None      ! +------------------------------------+----------+---------------+----------+ ! Popliteal                           !Yes       ! Yes            ! None ! +------------------------------------+----------+---------------+----------+ ! Sapheno Femoral Junction            ! Yes       ! Yes            ! None      ! +------------------------------------+----------+---------------+----------+ ! PTV                                 ! Yes       ! Yes            ! None      ! +------------------------------------+----------+---------------+----------+ ! Peroneal                            !Yes       ! Yes            ! None      ! +------------------------------------+----------+---------------+----------+ ! Gastroc                             ! Yes       ! Yes            ! None      ! +------------------------------------+----------+---------------+----------+ ! GSV Thigh                           ! Yes       ! Yes            ! None      ! +------------------------------------+----------+---------------+----------+ ! GSV Knee                            ! Yes       ! Yes            ! None      ! +------------------------------------+----------+---------------+----------+ ! GSV Ankle                           ! Yes       ! Yes            ! None      ! +------------------------------------+----------+---------------+----------+ ! SSV                                 ! Yes       ! Yes            ! None      ! +------------------------------------+----------+---------------+----------+      Impression:    Patient Active Problem List   Diagnosis    Severe major depressive disorder ([facility] [address]    Major depression, chronic    Hydronephrosis       Plan: no plan for intervention. Will give patient trial of passage. Will f/u in the office in 4 weeks with renal u/s to ensure resolution of hydronephrosis. Thank you.     Electronically signed by Ashlee Hennessy MD on 4/5/2019 at 8:04 AM

## 2019-04-05 NOTE — PLAN OF CARE
Problem: Altered Mood, Depressive Behavior:  Goal: Able to verbalize and/or display a decrease in depressive symptoms  Description  Able to verbalize and/or display a decrease in depressive symptoms  4/5/2019 0022 by Radha Gray RN  Outcome: Ongoing  Note:   Pt reports high depression at this time. She says she feels hopeless and helpless at this time. States she does not feel any better than before she came in. Pt thinks the medications she is on are not helping. Emotional support and reassurance given. Pt encouraged to speak with physician tomorrow regarding her concerns. She states, \"Earlier when I was laying in bed, I was thinking life is pretty useless. I don't feel like my depression is any different than when I came in.\"  She reports she is also going through some w/d. She says her anxiety is \"over the top. \" She did not attend hs wrap up group this evening despite staff encouragement. She denies issues with appetite, but did report poor sleep. Sleep aide given this evening. Pt remains in behavioral control and is medication compliant. Safety maintained per unit policy, including q 07O patient safety checks. Problem: Substance Abuse:  Goal: Absence of drug withdrawal signs and symptoms  Description  Absence of drug withdrawal signs and symptoms  4/5/2019 0022 by Radha Gray RN  Outcome: Ongoing  Note:   Pt reports mild withdrawal s/sx at this time. Pt states she has restless legs, her anxiety is \"over the top\" and she also c/o a headache earlier. Pt given medications for withdrawal this evening with hs medications. Will continue to monitor patient for safety and behavior.

## 2019-04-05 NOTE — GROUP NOTE
Group Therapy Note    Date: April 5    Group Start Time: 1100  Group End Time: 7711  Group Topic: Psychoeducation    [facility]    Tori Dias, CTRS    Pt did not attend RT group at 1100 d/t resting in room despite staff invitation to attend.       Signature:  Myah Duckworth

## 2019-04-05 NOTE — GROUP NOTE
Group Therapy Note    Date: April 5    Group Start Time: 1430  Group End Time: 1505  Group Topic: Community Meeting    RIMMA BHPRABHJOT pina, CTRS    Pt did not attend RT group at 1430 d/t resting in room despite staff invitation to attend.       Signature:  aCrly Rhoades

## 2019-04-05 NOTE — PROGRESS NOTES
Department of Psychiatry  Attending Progress Note  Chief Complaint: Severe major depressive disorder ([address]     SUBJECTIVE:  Cleve Ortega is seen in her room today. Patient reports that mood is improving gradually but still has periods of the day during which she has been feeling depressed, overwhelmed, feeling lack of motivation and having thoughts of suicide. Patient continues to complain of racing thoughts driving feelings of anxiety. Denies side effects to medications. Reports feeling hopeless at times about situation and cannot contract for safety outside of hospital setting. Explored her feelings and concerns. Reassurance and support provided. Charting and medications reviewed. There is no identifiable safe alternative other than continued hospitalization. OBJECTIVE    Physical  BP 90/68   Pulse 64   Temp 99 °F (37.2 °C) (Oral)   Resp 14   Ht 5' 2\" (1.575 m)   Wt 180 lb (81.6 kg)   LMP  (Exact Date)   SpO2 100%   Breastfeeding? No   BMI 32.92 kg/m²      Mental Status Evaluation:  Orientation: alertness: alert   Mood:. anxious and depressed      Affect:  flat      Appearance:  disheveled.    Activity:  Within Normal Limits   Gait/Posture: Normal   Speech:  normal pitch and normal volume   Thought Process:  circumstantial   Thought Content:  suicidal   Sensorium:  person, place, time/date and situation   Cognition:  grossly intact   Memory: intact   Insight:  limited   Judgment: limited   Suicidal Ideations: Less intense and frequent   Homicidal Ideations: Negative for homicidal ideation      Medication Side Effects: absent       Attention Span attention span and concentration were age appropriate     Medications  Current Facility-Administered Medications   Medication Dose Route Frequency Provider Last Rate Last Dose    [START ON 4/6/2019] sertraline (ZOLOFT) tablet 50 mg  50 mg Oral Daily Ambika Cancer, APRN - CNP        ondansetron (ZOFRAN) tablet 4 mg  4 mg Oral Q8H PRN Ambika Cancer, APRN - CNP  diphenhydrAMINE (BENADRYL) tablet 25 mg  25 mg Oral Nightly PRN Eura Falls, APRN - CNP   25 mg at 04/04/19 2101    acetaminophen (TYLENOL) tablet 650 mg  650 mg Oral Q4H PRN Eura Falls, APRN - CNP        hydrOXYzine (ATARAX) tablet 25 mg  25 mg Oral TID PRN Eura Falls, APRN - CNP   25 mg at 04/04/19 1907    traZODone (DESYREL) tablet 50 mg  50 mg Oral Nightly PRN Eura Falls, APRN - CNP   50 mg at 04/04/19 2101    benztropine mesylate (COGENTIN) injection 2 mg  2 mg Intramuscular BID PRN Eura Falls, APRN - CNP        magnesium hydroxide (MILK OF MAGNESIA) 400 MG/5ML suspension 30 mL  30 mL Oral Daily PRN Eura Falls, APRN - CNP        aluminum & magnesium hydroxide-simethicone (MAALOX) 200-200-20 MG/5ML suspension 30 mL  30 mL Oral Q6H PRN Eura Falls, APRN - CNP        nicotine polacrilex (NICORETTE) gum 2 mg  2 mg Oral Q2H PRN Eura Falls, APRN - CNP        haloperidol lactate (HALDOL) injection 5 mg  5 mg Intramuscular Q4H PRN Eura Falls, APRN - CNP        ibuprofen (ADVIL;MOTRIN) tablet 800 mg  800 mg Oral TID PRN Eura Falls, APRN - CNP   800 mg at 04/04/19 1907    tamsulosin (FLOMAX) capsule 0.4 mg  0.4 mg Oral Daily Eura Falls, APRN - CNP   0.4 mg at 04/05/19 0847    miconazole (MICOTIN) 2 % cream   Topical BID Charito Schwartz, APRN - CNP             [START ON 4/6/2019] sertraline  50 mg Oral Daily    tamsulosin  0.4 mg Oral Daily    miconazole   Topical BID       ASSESSMENT  Severe major depressive disorder ([address]     Patient's Response to Treatment: positive    PLAN:     1. Admit to inpatient psychiatric treatment  2. Supportive therapy with medication management. Reviewed risks and benefits as well as potential side effects with patient. 3. Therapeutic activities and groups  4. [facility] up at [facility] after symptoms stabilized. 5. Consult Internal Medicine for elevated liver enzymes and medical management  6. Increase Zoloft to 50 mg daily 4/5  7.  Social work for discharge planning.                Electronically signed by TERRI Rand CNP on 4/5/2019 at 11:51 AM.    Dragon voice recognition software used in portions of this document.

## 2019-04-05 NOTE — PLAN OF CARE
Problem: Altered Mood, Depressive Behavior:  Goal: Ability to disclose and discuss suicidal ideas will improve  Description  Ability to disclose and discuss suicidal ideas will improve  Outcome: Ongoing     Problem: Altered Mood, Depressive Behavior:  Goal: Able to verbalize and/or display a decrease in depressive symptoms  Description  Able to verbalize and/or display a decrease in depressive symptoms  4/5/2019 1143 by Sina Wood RN  Outcome: Ongoing     Patient admits to fleeting thoughts of suicide, no active plan. Patient verbalizes depressive symptoms at this time and states they are a 10/10. Patient is cooperative and appropriate on unit.

## 2019-04-05 NOTE — GROUP NOTE
Group Therapy Note    Date: April 5    Group Start Time: 0845  Group End Time: 0920  Group Topic: Community Meeting    RIMMA Coleman, CATHIS      Group Therapy Note    Patient's Goal:  Develop daily goal, discuss milieu schedule and expectations     Notes:  Pt developed daily goal to go to groups. Status After Intervention:  Improved    Participation Level:  Active Listener and Interactive    Participation Quality: Appropriate, Attentive and Sharing    Speech:  normal    Thought Process/Content: Logical    Affective Functioning: Congruent    Level of consciousness:  Alert, Oriented x4 and Attentive    Response to Learning: Able to verbalize current knowledge/experience, Capable of insight and Progressing to goal    Endings: None Reported    Modes of Intervention: Education, Socialization, Exploration, Problem-solving and Activity    Discipline Responsible: Psychoeducational Specialist      Signature:  Zackary Coleman, [address]

## 2019-04-05 NOTE — GROUP NOTE
Group Therapy Note    Date: April 5    Group Start Time: 1330  Group End Time: 8989  Group Topic: Cognitive Skills    [facility] Bryant    Pt did not participate in Cognitive Skills Group at [phone] despite staff encouragement.         Signature:  Josh Garvin

## 2019-04-06 ENCOUNTER — APPOINTMENT (OUTPATIENT)
Dept: GENERAL RADIOLOGY | Age: 59
DRG: 751 | End: 2019-04-06
Payer: MEDICAID

## 2019-04-06 PROCEDURE — 73030 X-RAY EXAM OF SHOULDER: CPT

## 2019-04-06 PROCEDURE — 99231 SBSQ HOSP IP/OBS SF/LOW 25: CPT | Performed by: INTERNAL MEDICINE

## 2019-04-06 PROCEDURE — 99232 SBSQ HOSP IP/OBS MODERATE 35: CPT | Performed by: PSYCHIATRY & NEUROLOGY

## 2019-04-06 PROCEDURE — 6370000000 HC RX 637 (ALT 250 FOR IP): Performed by: NURSE PRACTITIONER

## 2019-04-06 PROCEDURE — 6370000000 HC RX 637 (ALT 250 FOR IP): Performed by: PSYCHIATRY & NEUROLOGY

## 2019-04-06 PROCEDURE — 1240000000 HC EMOTIONAL WELLNESS R&B

## 2019-04-06 RX ORDER — DICYCLOMINE HCL 20 MG
20 TABLET ORAL 4 TIMES DAILY PRN
Status: DISCONTINUED | OUTPATIENT
Start: 2019-04-06 | End: 2019-04-16 | Stop reason: HOSPADM

## 2019-04-06 RX ADMIN — ONDANSETRON HYDROCHLORIDE 4 MG: 4 TABLET, FILM COATED ORAL at 04:00

## 2019-04-06 RX ADMIN — HYDROXYZINE HYDROCHLORIDE 25 MG: 25 TABLET, FILM COATED ORAL at 08:43

## 2019-04-06 RX ADMIN — DIPHENHYDRAMINE HCL 25 MG: 25 TABLET ORAL at 21:16

## 2019-04-06 RX ADMIN — SERTRALINE HYDROCHLORIDE 50 MG: 50 TABLET ORAL at 08:43

## 2019-04-06 RX ADMIN — TAMSULOSIN HYDROCHLORIDE 0.4 MG: 0.4 CAPSULE ORAL at 08:43

## 2019-04-06 RX ADMIN — HYDROXYZINE HYDROCHLORIDE 25 MG: 25 TABLET, FILM COATED ORAL at 15:42

## 2019-04-06 RX ADMIN — HYDROXYZINE HYDROCHLORIDE 25 MG: 25 TABLET, FILM COATED ORAL at 21:16

## 2019-04-06 RX ADMIN — DICYCLOMINE HYDROCHLORIDE 20 MG: 20 TABLET ORAL at 18:50

## 2019-04-06 RX ADMIN — TRAZODONE HYDROCHLORIDE 50 MG: 50 TABLET ORAL at 21:16

## 2019-04-06 NOTE — GROUP NOTE
Group Therapy Note    Date: April 6    Group Start Time: 1000  Group End Time: 1045  Group Topic: Psychotherapy    [facility]    Zaira Cheng    PT did not participate in 10:00 group. PT was offered 1:1 and declined on this date. Signature:   Zaira Cheng

## 2019-04-06 NOTE — PLAN OF CARE
Problem: Altered Mood, Depressive Behavior:  Goal: Ability to disclose and discuss suicidal ideas will improve  Description  Ability to disclose and discuss suicidal ideas will improve  4/6/2019 1033 by Lata Pringle RN  Outcome: Ongoing  Patient is currently admitting to suicidal thoughts, denies plan, contracts for safety. 15 min checks maintained. Problem: Altered Mood, Depressive Behavior:  Goal: Able to verbalize and/or display a decrease in depressive symptoms  Description  Able to verbalize and/or display a decrease in depressive symptoms  4/6/2019 1033 by Lata Pringle RN  Patient is alert, observed in room. Patient is currently admitting to depression and anxiety d/t increase life stressors. Reassurance and support given. Coping skills explored and discussed. Patient takes all medications without concern, denies any side effects. Patient is seclusive to room, only comes out for meals and needs. Encouraged pt to socialize with peers and attend unit programming. Will continue to reinforce, monitor and ensure safety.

## 2019-04-06 NOTE — PROGRESS NOTES
[facility] Internal Medicine    CONSULTATION / HISTORY AND PHYSICAL EXAMINATION            Date:   4/6/2019  Patient name:  Matias Crawford  Date of admission:  4/2/2019  1:38 PM  MRN:   [id number]  Account:  [de-identified]  YOB: 1960  PCP:    No primary care provider on file. Room:   [address] [place] [address]  Code Status:    Full Code    Physician Requesting Consult: Yung Quintero MD    Reason for Consult: Elevated liver enzymes,, left-sided hydronephrosis    Chief Complaint:     Chief Complaint   Patient presents with    Leg Pain     left    Mental Health Problem    Depression    Suicidal       History Obtained From:     patient, electronic medical record    History of Present Illness:   Patient is admitted for major depression  She has multiple medical problems which includes hypertension, hypothyroidism, history of colon cancer 13 years ago status post ileostomy, chemoradiation. Patient is not taking any medication for her thyroid. She was found positive for cannabinoids and cocaine, her liver enzymes were high. There is no previous liver enzymes to compare with  Patient is also complaining of abdominal pain, mainly located on left side of her abdomen. She had CT scan of abdomen suggestive of left-sided ureteric stone, with hydronephrosis  Abdominal pain is going on for last 2 weeks, she also is no noticed blood in the urine.   She never diagnosed with renal stones in the past.  4/5  Patient is doing much better  Complaining of pain at the previous stoma site    Past Medical History:     Past Medical History:   Diagnosis Date    Cancer ([address]     Hypertension     Thyroid disease         Past Surgical History:     Past Surgical History:   Procedure Laterality Date    HYSTERECTOMY      JOINT REPLACEMENT          Medications Prior to Admission:     Prior to Admission medications    Not on File        Allergies:     Clindamycin/lincomycin; Demerol hcl [meperidine]; appearing, and in no acute distress  Mental status: oriented to person, place, and time with normal affect  Head:  normocephalic, atraumatic. Eye: no icterus, redness, pupils equal and reactive, extraocular eye movements intact, conjunctiva clear  Ear: normal external ear, no discharge, hearing intact  Nose:  no drainage noted  Mouth: mucous membranes moist  Neck: supple, no carotid bruits, thyroid not palpable  Lungs: Bilateral equal air entry, clear to ausculation, no wheezing, rales or rhonchi, normal effort  Cardiovascular: normal rate, regular rhythm, no murmur, gallop, rub. Abdomen: Soft, nontender, nondistended, normal bowel sounds, ileostomy present, no guarding, rigidity   Neurologic: There are no new focal motor or sensory deficits, normal muscle tone and bulk, no abnormal sensation, normal speech, cranial nerves II through XII grossly intact  Skin: No gross lesions, rashes, bruising or bleeding on exposed skin area  Extremities:  peripheral pulses palpable, no pedal edema or calf pain with palpation  Psych: normal affect    Investigations:      Laboratory Testing:  No results found for this or any previous visit (from the past 24 hour(s)). Imaging/Diagonstics:  [facility] 45 Transitions 24 Hour Follow Up Call    2019    Patient: Bianca Oakley Patient : 1960    MRN: [id number]  Reason for Admission: No discharge information exists for this patient. Discharge Date:   RARS:  [facility] with: Facility: [unfilled]    Non-face-to-face services provided:      Inpatient Assessment  Care Transitions 24 Hour Call    Care Transitions Interventions         Follow Up  No future appointments.     Edilberto Handy MD  CT-scan of the abdomen    Assessment :      Primary Problem  Severe major depressive disorder [facility])    Active Hospital Problems    Diagnosis Date Noted    Hydronephrosis [N13.30]     [facility] [F32.2] 2019    Major depression, chronic

## 2019-04-06 NOTE — PROGRESS NOTES
[facility] Internal Medicine    CONSULTATION / HISTORY AND PHYSICAL EXAMINATION            Date:   4/5/2019  Patient name:  Tyler Kirby  Date of admission:  4/2/2019  1:38 PM  MRN:   [id number]  Account:  [de-identified]  YOB: 1960  PCP:    No primary care provider on file. Room:   [address] [place] [address]  Code Status:    Full Code    Physician Requesting Consult: Patricia Ren MD    Reason for Consult: Elevated liver enzymes,, left-sided hydronephrosis    Chief Complaint:     Chief Complaint   Patient presents with    Leg Pain     left    Mental Health Problem    Depression    Suicidal       History Obtained From:     patient, electronic medical record    History of Present Illness:   Patient is admitted for major depression  She has multiple medical problems which includes hypertension, hypothyroidism, history of colon cancer 13 years ago status post ileostomy, chemoradiation. Patient is not taking any medication for her thyroid. She was found positive for cannabinoids and cocaine, her liver enzymes were high. There is no previous liver enzymes to compare with  Patient is also complaining of abdominal pain, mainly located on left side of her abdomen. She had CT scan of abdomen suggestive of left-sided ureteric stone, with hydronephrosis  Abdominal pain is going on for last 2 weeks, she also is no noticed blood in the urine.   She never diagnosed with renal stones in the past.  4/5  Patient is doing much better  Complaining of pain at the previous stoma site    Past Medical History:     Past Medical History:   Diagnosis Date    Cancer ([address]     Hypertension     Thyroid disease         Past Surgical History:     Past Surgical History:   Procedure Laterality Date    HYSTERECTOMY      JOINT REPLACEMENT          Medications Prior to Admission:     Prior to Admission medications    Not on File        Allergies:     Clindamycin/lincomycin; Demerol hcl [meperidine]; place, and time with normal affect  Head:  normocephalic, atraumatic. Eye: no icterus, redness, pupils equal and reactive, extraocular eye movements intact, conjunctiva clear  Ear: normal external ear, no discharge, hearing intact  Nose:  no drainage noted  Mouth: mucous membranes moist  Neck: supple, no carotid bruits, thyroid not palpable  Lungs: Bilateral equal air entry, clear to ausculation, no wheezing, rales or rhonchi, normal effort  Cardiovascular: normal rate, regular rhythm, no murmur, gallop, rub. Abdomen: Soft, nontender, nondistended, normal bowel sounds, ileostomy present, no guarding, rigidity   Neurologic: There are no new focal motor or sensory deficits, normal muscle tone and bulk, no abnormal sensation, normal speech, cranial nerves II through XII grossly intact  Skin: No gross lesions, rashes, bruising or bleeding on exposed skin area  Extremities:  peripheral pulses palpable, no pedal edema or calf pain with palpation  Psych: normal affect    Investigations:      Laboratory Testing:  No results found for this or any previous visit (from the past 24 hour(s)). Imaging/Diagonstics:  [facility] 45 Transitions 24 Hour Follow Up Call    2019    Patient: [place] Patient : 1960    MRN: [id number]  Reason for Admission: No discharge information exists for this patient. Discharge Date:   RARS: [facility] with: Facility: [unfilled]    Non-face-to-face services provided:      Inpatient Assessment  Care Transitions 24 Hour Call    Care Transitions Interventions         Follow Up  No future appointments. Clary Diaz MD  CT-scan of the abdomen    Assessment :      Primary Problem  Severe major depressive disorder [facility])    Active Hospital Problems    Diagnosis Date Noted    Hydronephrosis [N13.30]     Severe major depressive disorder ([facility] [F32.2] 2019    Major depression, chronic [F34.1] 2019       Plan:     1.  Elevated liver enzyme, patient refusing alcohol abuse, likely secondary to polysubstance abuse, we will repeat liver function test, acute hepatitis panel  2 . ureteric stone with left-sided hydronephrosis, concentric urology,  3 . Patient Scooter Lara history of hypothyroidism, not taking any medication, awaiting results of thyroid function test  4 . Major depression, psychiatry following  5 colon cancer status post surgery, history of chemoradiation  6 . Cocaine abuse  7 . Marijuana abuse. 4/4  Patient is doing much better  Urology input awaited , discussed with RN  Hepatitis C antibodies positive, ordering hepatitis C quantitative RNA PCR, AFP  Patient mentioned that her ex-bed partner was positive for hepatitis C  Her abdominal pain has much improved  TSH is normal    4/5  Patient evaluated by urologist, no plan for procedure at this time  Will need outpatient follow with urologist  Will need repeat ultrasound to look for hydronephrosis  Tylenol for pain at the stoma site  We will avoid opioids with history of substance abuse    Consultations:   IP CONSULT TO HOSPITALIST  IP CONSULT TO INTERNAL MEDICINE  IP CONSULT TO UROLOGY  IP CONSULT TO MD Darin  4/5/2019  9:09 PM    Copy sent to Dr. Steph Canela primary care provider on file. Please note that this chart was generated using voice recognition Dragon dictation software. Although every effort was made to ensure the accuracy of this automated transcription, some errors in transcription may have occurred.

## 2019-04-06 NOTE — GROUP NOTE
Group Therapy Note    Date: April 6    Group Start Time: 1600  Group End Time: 8674  Group Topic: Psychoeducation    [facility]    Anthony Padron      Patient's Goal:  Discuss critical thinking    Notes:      Status After Intervention:  Improved    Participation Level: Interactive    Participation Quality: Appropriate and Attentive      Speech:  Good      Thought Process/Content: Logical      Affective Functioning: Congruent      Mood: Good      Level of consciousness:  Alert      Response to Learning: Capable of insight      Endings: None Reported    Modes of Intervention: Education      Discipline Responsible: Behavorial Health Tech      Signature:  Anthony Padron

## 2019-04-06 NOTE — PLAN OF CARE
Pt remains isolative to self, brightened when speaking with staff being able to voice her concerns. Pt states she has significant pain at her ostomy site making it difficult to focus on care she needs for admission.  brought in home supplies patient was able to use her supplies and feels much better. Denies SI HI and hallucinations.

## 2019-04-06 NOTE — PROGRESS NOTES
Oral TID PRN TERRI Rinaldi CNP   25 mg at 04/06/19 0843    traZODone (DESYREL) tablet 50 mg  50 mg Oral Nightly PRN TERRI Rinaldi - CNP   50 mg at 04/05/19 2053    benztropine mesylate (COGENTIN) injection 2 mg  2 mg Intramuscular BID PRN TERRI Rinaldi - CNP        magnesium hydroxide (MILK OF MAGNESIA) 400 MG/5ML suspension 30 mL  30 mL Oral Daily PRN TERRI Rinaldi CNP        aluminum & magnesium hydroxide-simethicone (MAALOX) 200-200-20 MG/5ML suspension 30 mL  30 mL Oral Q6H PRN TERRI Rinaldi - CNP        nicotine polacrilex (NICORETTE) gum 2 mg  2 mg Oral Q2H PRN TERRI Rinaldi - CNP        haloperidol lactate (HALDOL) injection 5 mg  5 mg Intramuscular Q4H PRN TERRI Rinaldi - CNP        ibuprofen (ADVIL;MOTRIN) tablet 800 mg  800 mg Oral TID PRN TERRI Rinaldi - CNP   800 mg at 04/04/19 1907    tamsulosin (FLOMAX) capsule 0.4 mg  0.4 mg Oral Daily TERRI Rinaldi CNP   0.4 mg at 04/06/19 0843    miconazole (MICOTIN) 2 % cream   Topical BID TERRI Robles CNP             sertraline  50 mg Oral Daily    tamsulosin  0.4 mg Oral Daily    miconazole   Topical BID       ASSESSMENT  Severe major depressive disorder ([facility]     Patient's Response to Treatment: positive    PLAN:   Will continue to adjust medications accordingly   Social work for discharge planning.                Electronically signed by Perry Schwab MD on 4/6/2019 at 1:07 PM.

## 2019-04-06 NOTE — GROUP NOTE
Group Therapy Note    Date: April 6    Group Start Time: 0900  Group End Time: 0920  Group Topic: Community Meeting    [facility]    Doyle Mercedes    Pt did not participate in community meeting/ goals group at 0900 despite staff encouragement to attend.      Signature:  Doyle Mercedes

## 2019-04-06 NOTE — BH NOTE
Wrap-up GROUP NOTE    Date:  04/05/2019 Start Time: 8:00pm  End Time: 8:45pm    Number Participants in Group:  06/17    Goal:  Patient will demonstrate increased interpersonal interaction   Topic:  Wrap Up and Relaxation Groups    Discipline Responsible:   OT  AT   x Nsg.  RT  Other       Participation Level:     None  Minimal   x Active Listener x Interactive    Monopolizing         Participation Quality:  x Appropriate  Inappropriate   x       Attentive        Intrusive   x       Sharing        Resistant   x       Supportive        Lethargic       Affective:   x Congruent  Incongruent  Blunted  Flat    Constricted  Anxious  Elated  Angry    Labile  Depressed  Other         Cognitive:  x Alert  Oriented PPTP     Concentration x G  F  P   Attention Span x G  F  P   Short-Term Memory x G  F  P   Long-Term Memory x G  F  P   ProblemSolving/  Decision Making x G  F  P   Ability to Process  Information x G  F  P      Contributing Factors             Delusional             Hallucinating             Flight of Ideas             Other:       Modes of Intervention:  x Education  Support  Exploration   x Clarifying  Problem Solving  Confrontation    Socialization  Limit Setting  Reality Testing    Activity  Movement  Media    Other:            Response to Learning:  x Able to verbalize current knowledge/experience   x Able to verbalize/acknowledge new learning    Able to retain information    Capable of insight    Able to change behavior    Progressing to goal    Other:        Comments:  Pt attended and participated in  Wrap Up and Relaxation Groups this evening. AFTERNOON    Goal:  Patient will verbalize readiness for discharge, and list coping skills relating to anxiety. Topic:  What goals are you working towards?

## 2019-04-07 PROCEDURE — 6370000000 HC RX 637 (ALT 250 FOR IP): Performed by: NURSE PRACTITIONER

## 2019-04-07 PROCEDURE — 99232 SBSQ HOSP IP/OBS MODERATE 35: CPT | Performed by: PSYCHIATRY & NEUROLOGY

## 2019-04-07 PROCEDURE — 1240000000 HC EMOTIONAL WELLNESS R&B

## 2019-04-07 PROCEDURE — 6370000000 HC RX 637 (ALT 250 FOR IP): Performed by: PSYCHIATRY & NEUROLOGY

## 2019-04-07 RX ORDER — ARIPIPRAZOLE 2 MG/1
2 TABLET ORAL DAILY
Status: DISCONTINUED | OUTPATIENT
Start: 2019-04-07 | End: 2019-04-08

## 2019-04-07 RX ORDER — SERTRALINE HYDROCHLORIDE 100 MG/1
100 TABLET, FILM COATED ORAL DAILY
Status: DISCONTINUED | OUTPATIENT
Start: 2019-04-08 | End: 2019-04-12

## 2019-04-07 RX ORDER — ZOLPIDEM TARTRATE 5 MG/1
5 TABLET ORAL NIGHTLY
Status: DISCONTINUED | OUTPATIENT
Start: 2019-04-07 | End: 2019-04-08

## 2019-04-07 RX ADMIN — ARIPIPRAZOLE 2 MG: 2 TABLET ORAL at 18:08

## 2019-04-07 RX ADMIN — TAMSULOSIN HYDROCHLORIDE 0.4 MG: 0.4 CAPSULE ORAL at 08:24

## 2019-04-07 RX ADMIN — MICONAZOLE NITRATE: 20 CREAM TOPICAL at 08:25

## 2019-04-07 RX ADMIN — HYDROXYZINE HYDROCHLORIDE 25 MG: 25 TABLET, FILM COATED ORAL at 05:20

## 2019-04-07 RX ADMIN — ZOLPIDEM TARTRATE 5 MG: 5 TABLET, FILM COATED ORAL at 20:59

## 2019-04-07 RX ADMIN — HYDROXYZINE HYDROCHLORIDE 25 MG: 25 TABLET, FILM COATED ORAL at 15:50

## 2019-04-07 RX ADMIN — SERTRALINE HYDROCHLORIDE 50 MG: 50 TABLET ORAL at 08:24

## 2019-04-07 NOTE — PLAN OF CARE
Pt is flat and sad during talk time. Pt denies suicidal ideations and agrees to feeling safe on the unit. Pt reports depression and anxiety rating both a 7/10. Pt admits to poor sleep and states the medication is not helping. Pt reports racing thoughts. Writer encouraged Pt to speak with Dr about concerns. Pt is disheveled and encouraged to tend to hygiene and ADL's. Pt. Remains on q15 min checks and frequent spontaneous checks throughout shift. Pt. Safety maintained.

## 2019-04-07 NOTE — GROUP NOTE
Group Therapy Note    Date: April 7    Group Start Time: 1000  Group End Time: 1055  Group Topic: Psychoeducation    RIMMA Lopez    Pt declined to attend psychotherapy at 1000 am despite encouragement . PT was offered 1:1 and declined on this date.

## 2019-04-07 NOTE — GROUP NOTE
Group Therapy Note    Date: April 7    Group Start Time: 0900  Group End Time: 6876  Group Topic: Community Meeting    [facility]    Josie Tabares    Pt did not participate in community meeting/ goals group at 0900 despite staff encouragement to attend.         Signature:  Josie Tabares

## 2019-04-07 NOTE — GROUP NOTE
Group Therapy Note    Date: April 7    Group Start Time: 1330  Group End Time: 3167  Group Topic: Psychoeducation    RIMMA Cuevas    Pt did not participate in leisure/ cognitive skills group at 1330 despite staff encouragement to attend.           Signature:  Ninfa Cuevas

## 2019-04-07 NOTE — PROGRESS NOTES
Department of Psychiatry  Attending Progress Note  Chief Complaint: Severe major depressive disorder ([address]     SUBJECTIVE:  Sabrina Hilliard is seen in her room today. Found her sad and depressed. Reports feeling very depressed this morning. Also reports sleep difficulties and worsening anxiety. Explored her feelings and concerns. Reassurance and support provided. Charting and medications reviewed. There is no identifiable safe alternative other than continued hospitalization. OBJECTIVE    Physical  /62   Pulse 67   Temp 97.9 °F (36.6 °C)   Resp 14   Ht 5' 2\" (1.575 m)   Wt 180 lb (81.6 kg)   LMP  (Exact Date)   SpO2 100%   Breastfeeding? No   BMI 32.92 kg/m²      Mental Status Evaluation:  Orientation: alertness: alert   Mood:. anxious and depressed      Affect:  flat      Appearance:  disheveled.    Activity:  Within Normal Limits   Gait/Posture: Normal   Speech:  normal pitch and normal volume   Thought Process:  circumstantial   Thought Content:  suicidal   Sensorium:  person, place, time/date and situation   Cognition:  grossly intact   Memory: intact   Insight:  limited   Judgment: limited   Suicidal Ideations: Less intense and frequent   Homicidal Ideations: Negative for homicidal ideation      Medication Side Effects: absent       Attention Span attention span and concentration were age appropriate     Medications  Current Facility-Administered Medications   Medication Dose Route Frequency Provider Last Rate Last Dose    dicyclomine (BENTYL) tablet 20 mg  20 mg Oral 4x Daily PRN Lit Jones MD   20 mg at 04/06/19 1850    sertraline (ZOLOFT) tablet 50 mg  50 mg Oral Daily TERRI Pike CNP   50 mg at 04/07/19 0824    ondansetron (ZOFRAN) tablet 4 mg  4 mg Oral Q8H PRN TERRI Pike CNP   4 mg at 04/06/19 0400    diphenhydrAMINE (BENADRYL) tablet 25 mg  25 mg Oral Nightly PRN TERRI Pkie CNP   25 mg at 04/06/19 2116    acetaminophen (TYLENOL) tablet 650 mg  650 mg Oral Q4H PRN Azra Pronto, APRN - CNP   650 mg at 04/05/19 1539    hydrOXYzine (ATARAX) tablet 25 mg  25 mg Oral TID PRN Azra Pronto, APRN - CNP   25 mg at 04/07/19 0520    traZODone (DESYREL) tablet 50 mg  50 mg Oral Nightly PRN Azra Pronto, APRN - CNP   50 mg at 04/06/19 2116    benztropine mesylate (COGENTIN) injection 2 mg  2 mg Intramuscular BID PRN Azra Pronto, APRN - CNP        magnesium hydroxide (MILK OF MAGNESIA) 400 MG/5ML suspension 30 mL  30 mL Oral Daily PRN Azra Pronto, APRN - CNP        aluminum & magnesium hydroxide-simethicone (MAALOX) 200-200-20 MG/5ML suspension 30 mL  30 mL Oral Q6H PRN Azra Pronto, APRN - CNP        nicotine polacrilex (NICORETTE) gum 2 mg  2 mg Oral Q2H PRN Azra Pronto, APRN - CNP        haloperidol lactate (HALDOL) injection 5 mg  5 mg Intramuscular Q4H PRN Azra Pronto, APRN - CNP        ibuprofen (ADVIL;MOTRIN) tablet 800 mg  800 mg Oral TID PRN Azra Pronto, APRN - CNP   800 mg at 04/04/19 1907    tamsulosin (FLOMAX) capsule 0.4 mg  0.4 mg Oral Daily Azra Pronto, APRN - CNP   0.4 mg at 04/07/19 0824    miconazole (MICOTIN) 2 % cream   Topical BID Yuly Jimenez APRN - CNP             sertraline  50 mg Oral Daily    tamsulosin  0.4 mg Oral Daily    miconazole   Topical BID       ASSESSMENT  Severe major depressive disorder ([facility]     Patient's Response to Treatment: positive    PLAN:   Will continue to adjust medications accordingly   Social work for discharge planning.                Electronically signed by Yi Alicea MD on 4/7/2019 at 2:27 PM.

## 2019-04-07 NOTE — PLAN OF CARE
Problem: Altered Mood, Depressive Behavior:  Goal: Ability to disclose and discuss suicidal ideas will improve  Description  Ability to disclose and discuss suicidal ideas will improve  Outcome: Ongoing  Note:   Pt denies suicidal ideation at this time. She reports anxiety and depression. Pt knows to seek staff support if anxiety worsens or suicidal ideation is present. Safety checks maintained q15 min and irregular rounding maintained. Problem: Altered Mood, Depressive Behavior:  Goal: Able to verbalize and/or display a decrease in depressive symptoms  Description  Able to verbalize and/or display a decrease in depressive symptoms  Note:   Pt was interactive with staff but withdrawn to her room most of the evening. She was able to discuss her home life and family with staff. She stated she feels worthless. Safety checks maintained q15 min and irregular rounding maintained.

## 2019-04-08 PROCEDURE — 6370000000 HC RX 637 (ALT 250 FOR IP): Performed by: PSYCHIATRY & NEUROLOGY

## 2019-04-08 PROCEDURE — 1240000000 HC EMOTIONAL WELLNESS R&B

## 2019-04-08 PROCEDURE — 6370000000 HC RX 637 (ALT 250 FOR IP): Performed by: NURSE PRACTITIONER

## 2019-04-08 PROCEDURE — 99232 SBSQ HOSP IP/OBS MODERATE 35: CPT | Performed by: PSYCHIATRY & NEUROLOGY

## 2019-04-08 RX ORDER — ARIPIPRAZOLE 5 MG/1
5 TABLET ORAL DAILY
Status: DISCONTINUED | OUTPATIENT
Start: 2019-04-09 | End: 2019-04-09

## 2019-04-08 RX ADMIN — HYDROXYZINE HYDROCHLORIDE 25 MG: 25 TABLET, FILM COATED ORAL at 12:57

## 2019-04-08 RX ADMIN — HYDROXYZINE HYDROCHLORIDE 25 MG: 25 TABLET, FILM COATED ORAL at 21:00

## 2019-04-08 RX ADMIN — TAMSULOSIN HYDROCHLORIDE 0.4 MG: 0.4 CAPSULE ORAL at 09:08

## 2019-04-08 RX ADMIN — ARIPIPRAZOLE 2 MG: 2 TABLET ORAL at 09:08

## 2019-04-08 RX ADMIN — SERTRALINE HYDROCHLORIDE 100 MG: 100 TABLET ORAL at 09:08

## 2019-04-08 RX ADMIN — DIPHENHYDRAMINE HCL 25 MG: 25 TABLET ORAL at 21:00

## 2019-04-08 NOTE — GROUP NOTE
Group Therapy Note    Date: April 8    Group Start Time: 8537  Group End Time: 7717  Group Topic: [address] [facility]    Brittany Kuo, CATHIS      Pt refuses to attend 850 community meeting d/t resting in room despite staff encouragement to attend group         Signature:  Pancho Bonilla

## 2019-04-08 NOTE — GROUP NOTE
Group Therapy Note    Date: April 8    Group Start Time: 1000  Group End Time: 1262  Group Topic: Psychoeducation    [facility]    Tori Dias, CTRS      Group Therapy Note           Patient's Goal:  To demonstrate interpersonal interaction   Notes:  Pt attended and participated in group. Status After Intervention:  Improved    Participation Level:  Active Listener and Interactive    Participation Quality: Appropriate and Attentive      Speech:  normal      Thought Process/Content: Logical      Affective Functioning: Congruent      Mood: euthymic      Level of consciousness:  Alert and Attentive      Response to Learning: Progressing to goal      Endings: None Reported    Modes of Intervention: Socialization, Problem-solving, Activity and Reality-testing      Discipline Responsible: Psychoeducational Specialist      Signature:  Adilene Walton

## 2019-04-08 NOTE — BH NOTE
Wrap-up GROUP NOTE    Date:  04/07/2019 Start Time: 8:00pm  End Time: 8:45pm    Number Participants in Group:  09/18    Goal:  Patient will demonstrate increased interpersonal interaction   Topic:  Wrap Up and Relaxation Groups    Discipline Responsible:   OT  AT   x Nsg.  RT  Other       Participation Level:     None  Minimal   x Active Listener x Interactive    Monopolizing         Participation Quality:  x Appropriate  Inappropriate   x       Attentive        Intrusive   x       Sharing        Resistant   x       Supportive        Lethargic       Affective:   x Congruent  Incongruent  Blunted  Flat    Constricted  Anxious  Elated  Angry    Labile  Depressed  Other         Cognitive:  x Alert  Oriented PPTP     Concentration x G  F  P   Attention Span x G  F  P   Short-Term Memory x G  F  P   Long-Term Memory x G  F  P   ProblemSolving/  Decision Making x G  F  P   Ability to Process  Information x G  F  P      Contributing Factors             Delusional             Hallucinating             Flight of Ideas             Other:       Modes of Intervention:  x Education  Support  Exploration   x Clarifying  Problem Solving  Confrontation    Socialization  Limit Setting  Reality Testing    Activity  Movement  Media    Other:            Response to Learning:  x Able to verbalize current knowledge/experience   x Able to verbalize/acknowledge new learning    Able to retain information    Capable of insight    Able to change behavior    Progressing to goal    Other:        Comments:  Pt attended and participated in  Wrap Up and Relaxation Groups this evening. AFTERNOON    Goal:  Patient will verbalize readiness for discharge, and list coping skills relating to anxiety.    Topic:  Anxiety and Prayer

## 2019-04-08 NOTE — BH NOTE
Department of Psychiatry  Attending Progress Note  Chief Complaint: Severe major depressive disorder ([address]     SUBJECTIVE:    Patient said that she is feeling depressed and sad. She has increased psychomotor activity. She has poor eye contact. She feels that people are watching her and she is afraid of them. She is hearing voices. The voices are vague but they're mumbling at her. She has suicidal thoughts. OBJECTIVE    Physical  /67   Pulse 70   Temp 97.2 °F (36.2 °C) (Oral)   Resp 14   Ht 5' 2\" (1.575 m)   Wt 180 lb (81.6 kg)   LMP  (Exact Date)   SpO2 100%   Breastfeeding?  No   BMI 32.92 kg/m²      Mental Status Evaluation:  Orientation: alertness: alert   Mood:. anxious      Affect:  constricted      Appearance:  age appropriate   Activity:  Psychomotor Agitation   Gait/Posture: Normal   Speech:  delayed, increased latency of response, normal pitch and normal volume   Thought Process:  circumstantial   Thought Content:  hallucinations   Sensorium:  person, place and time/date   Cognition:  grossly intact   Memory: intact   Insight:  limited   Judgment: limited   Suicidal Ideations: passive   Homicidal Ideations: Negative for homicidal ideation      Medication Side Effects: absent       Attention Span attention span appeared shorter than expected for age     Medications  Current Facility-Administered Medications   Medication Dose Route Frequency Provider Last Rate Last Dose    [START ON 4/9/2019] ARIPiprazole (ABILIFY) tablet 5 mg  5 mg Oral Daily Rudy HEAD MD        melatonin ER tablet 1 mg  1 mg Oral Nightly PRN Kristina Cooper MD        sertraline (ZOLOFT) tablet 100 mg  100 mg Oral Daily Fausto Ramos MD   100 mg at 04/08/19 0908    dicyclomine (BENTYL) tablet 20 mg  20 mg Oral 4x Daily PRN James Daniels MD   20 mg at 04/06/19 1850    ondansetron (ZOFRAN) tablet 4 mg  4 mg Oral Q8H PRN TERRI Diaz - CNP   4 mg at 04/06/19 0400    diphenhydrAMINE (BENADRYL) tablet

## 2019-04-08 NOTE — BH NOTE
Retrieved phone call from Dr. Jimbo Jason, he was inquiring about patient taking Flomax. Reviewed patient chart in formed him that patient had CT scan completed and stones were present and patient was started on Flomax due to stones upon reviewing notes from NP Alfredito Merritt NP. Instructed to order med consult and ultrasound to bladder and kidney. Orders reviewed and placed.

## 2019-04-09 LAB
DIRECT EXAM: ABNORMAL
DIRECT EXAM: ABNORMAL
Lab: ABNORMAL
SPECIMEN DESCRIPTION: ABNORMAL

## 2019-04-09 PROCEDURE — 6370000000 HC RX 637 (ALT 250 FOR IP): Performed by: NURSE PRACTITIONER

## 2019-04-09 PROCEDURE — 6370000000 HC RX 637 (ALT 250 FOR IP): Performed by: PSYCHIATRY & NEUROLOGY

## 2019-04-09 PROCEDURE — 99232 SBSQ HOSP IP/OBS MODERATE 35: CPT | Performed by: INTERNAL MEDICINE

## 2019-04-09 PROCEDURE — 1240000000 HC EMOTIONAL WELLNESS R&B

## 2019-04-09 PROCEDURE — 99232 SBSQ HOSP IP/OBS MODERATE 35: CPT | Performed by: PSYCHIATRY & NEUROLOGY

## 2019-04-09 RX ORDER — DIPHENHYDRAMINE HCL 25 MG
50 TABLET ORAL NIGHTLY PRN
Status: DISCONTINUED | OUTPATIENT
Start: 2019-04-09 | End: 2019-04-16 | Stop reason: HOSPADM

## 2019-04-09 RX ORDER — ARIPIPRAZOLE 10 MG/1
10 TABLET ORAL DAILY
Status: DISCONTINUED | OUTPATIENT
Start: 2019-04-10 | End: 2019-04-12

## 2019-04-09 RX ORDER — BUSPIRONE HYDROCHLORIDE 5 MG/1
5 TABLET ORAL 3 TIMES DAILY
Status: DISCONTINUED | OUTPATIENT
Start: 2019-04-09 | End: 2019-04-16 | Stop reason: HOSPADM

## 2019-04-09 RX ORDER — BUPROPION HYDROCHLORIDE 100 MG/1
100 TABLET ORAL DAILY
Status: DISCONTINUED | OUTPATIENT
Start: 2019-04-09 | End: 2019-04-16 | Stop reason: HOSPADM

## 2019-04-09 RX ADMIN — HYDROXYZINE HYDROCHLORIDE 25 MG: 25 TABLET, FILM COATED ORAL at 21:33

## 2019-04-09 RX ADMIN — BUPROPION HYDROCHLORIDE 100 MG: 100 TABLET, FILM COATED ORAL at 12:14

## 2019-04-09 RX ADMIN — BUSPIRONE HYDROCHLORIDE 5 MG: 5 TABLET ORAL at 12:14

## 2019-04-09 RX ADMIN — BUSPIRONE HYDROCHLORIDE 5 MG: 5 TABLET ORAL at 21:33

## 2019-04-09 RX ADMIN — HYDROXYZINE HYDROCHLORIDE 25 MG: 25 TABLET, FILM COATED ORAL at 08:29

## 2019-04-09 RX ADMIN — IBUPROFEN 800 MG: 800 TABLET ORAL at 20:23

## 2019-04-09 RX ADMIN — TAMSULOSIN HYDROCHLORIDE 0.4 MG: 0.4 CAPSULE ORAL at 08:29

## 2019-04-09 RX ADMIN — BUSPIRONE HYDROCHLORIDE 5 MG: 5 TABLET ORAL at 14:23

## 2019-04-09 RX ADMIN — Medication 1 MG: at 21:33

## 2019-04-09 RX ADMIN — ARIPIPRAZOLE 5 MG: 5 TABLET ORAL at 08:29

## 2019-04-09 RX ADMIN — MICONAZOLE NITRATE: 20 CREAM TOPICAL at 08:30

## 2019-04-09 RX ADMIN — DIPHENHYDRAMINE HCL 50 MG: 25 TABLET ORAL at 21:33

## 2019-04-09 RX ADMIN — SERTRALINE HYDROCHLORIDE 100 MG: 100 TABLET ORAL at 08:29

## 2019-04-09 ASSESSMENT — PAIN DESCRIPTION - PAIN TYPE: TYPE: ACUTE PAIN

## 2019-04-09 ASSESSMENT — PAIN SCALES - GENERAL
PAINLEVEL_OUTOF10: 7
PAINLEVEL_OUTOF10: 4

## 2019-04-09 ASSESSMENT — PAIN DESCRIPTION - LOCATION: LOCATION: ABDOMEN

## 2019-04-09 NOTE — GROUP NOTE
Group Therapy Note    Date: April 9    Group Start Time: 0845  Group End Time: 0900  Group Topic: Community Meeting    RIMMA Valle Harper [place], [place]        Group Therapy Note    Attendees: 7         Patient's Goal:  Pt's goal was to get some sleep. Notes:  Pt attended and participated in group. Status After Intervention:  Improved    Participation Level:  Active Listener and Interactive    Participation Quality: Appropriate, Attentive and Sharing      Speech:  normal      Thought Process/Content: Logical      Affective Functioning: Congruent      Mood: euthymic      Level of consciousness:  Alert, Oriented x4 and Attentive      Response to Learning: Able to verbalize current knowledge/experience and Progressing to goal      Endings: None Reported    Modes of Intervention: Education, Support, Socialization, Problem-solving and Reality-testing      Discipline Responsible: Psychoeducational Specialist      Signature:  Laura Alvarenga

## 2019-04-09 NOTE — BH NOTE
tablet 4 mg  4 mg Oral Q8H PRN Ambika Cancer, APRN - CNP   4 mg at 04/06/19 0400    acetaminophen (TYLENOL) tablet 650 mg  650 mg Oral Q4H PRN Ambika Cancer, APRN - CNP   650 mg at 04/05/19 1539    hydrOXYzine (ATARAX) tablet 25 mg  25 mg Oral TID PRN Ambika Cancer, APRN - CNP   25 mg at 04/09/19 0829    benztropine mesylate (COGENTIN) injection 2 mg  2 mg Intramuscular BID PRN Ambika Cancer, APRN - CNP        magnesium hydroxide (MILK OF MAGNESIA) 400 MG/5ML suspension 30 mL  30 mL Oral Daily PRN Ambika Cancer, APRN - CNP        aluminum & magnesium hydroxide-simethicone (MAALOX) 200-200-20 MG/5ML suspension 30 mL  30 mL Oral Q6H PRN Ambika Cancer, APRN - CNP        nicotine polacrilex (NICORETTE) gum 2 mg  2 mg Oral Q2H PRN Ambika Cancer, APRN - CNP        haloperidol lactate (HALDOL) injection 5 mg  5 mg Intramuscular Q4H PRN Ambika Cancer, APRN - CNP        ibuprofen (ADVIL;MOTRIN) tablet 800 mg  800 mg Oral TID PRN Ambika Cancer, APRN - CNP   800 mg at 04/04/19 1907    tamsulosin (FLOMAX) capsule 0.4 mg  0.4 mg Oral Daily Ambika Cancer, APRN - CNP   0.4 mg at 04/09/19 0829    miconazole (MICOTIN) 2 % cream   Topical BID Trinh Alvarez, APRN - CNP            Franky Anderson [START ON 4/10/2019] ARIPiprazole  10 mg Oral Daily    buPROPion  100 mg Oral Daily    busPIRone  5 mg Oral TID    sertraline  100 mg Oral Daily    tamsulosin  0.4 mg Oral Daily    miconazole   Topical BID       ASSESSMENT  Severe major depressive disorder ([address]     Patient's Response to Treatment: negative    PLAN  Dragon voice recognition software used in portions of this document. Increase the Abilify.   Start her on BuSpar 5 MG p.o. t.i.d.

## 2019-04-09 NOTE — GROUP NOTE
Group Therapy Note    Date: April 9    Group Start Time: 1330  Group End Time: 9805  Group Topic: Recovery    [facility]    NELY Glaser, MALENA        Group Therapy Note    Attendees: 7      Patient's Goals: Increase socialization and understanding of mental health stability    Notes: Patient is making progress AEB participating in group discussion, actively listening, and supporting others. Status After Intervention:  Improved    Participation Level:  Active Listener and Interactive    Participation Quality: Appropriate, Attentive, Sharing and Supportive      Speech:  normal      Thought Process/Content: Logical      Affective Functioning: Congruent      Mood: stable      Level of consciousness:  Alert, Oriented x4 and Attentive      Response to Learning: Able to verbalize current knowledge/experience, Able to verbalize/acknowledge new learning, Able to retain information, Capable of insight, Able to change behavior and Progressing to goal      Endings: None Reported    Modes of Intervention: Education, Support, Socialization and Problem-solving      Discipline Responsible: /Counselor      Signature:  NELY Glaser LSW

## 2019-04-09 NOTE — PLAN OF CARE
Problem: Altered Mood, Depressive Behavior:  Goal: Able to verbalize and/or display a decrease in depressive symptoms  Description  Able to verbalize and/or display a decrease in depressive symptoms  4/8/2019 2253 by Osito Little RN  Outcome: Ongoing  Note:   Patient voices depression     Problem: Altered Mood, Depressive Behavior:  Goal: Ability to disclose and discuss suicidal ideas will improve  Description  Ability to disclose and discuss suicidal ideas will improve  4/8/2019 2253 by Osito Little RN  Outcome: Ongoing  Note:   Denies suicidal ideations

## 2019-04-09 NOTE — CONSULTS
hours. Glycosylated hemoglobin A1C: No results for input(s): LABA1C in the last 72 hours. INR: No results for input(s): INR in the last 72 hours. Hepatic functions: No results for input(s): ALKPHOS, ALT, AST, PROT, BILITOT, BILIDIR, LABALBU in the last 72 hours. Pancreatic functions:No results for input(s): LACTA, AMYLASE in the last 72 hours. S. Lactic Acid: No results for input(s): LACTA in the last 72 hours. Cardiac enzymes:No results for input(s): CKTOTAL, CKMB, CKMBINDEX, TROPONINI in the last 72 hours. BNP:No results for input(s): BNP in the last 72 hours. Lipid profile: No results for input(s): CHOL, TRIG, HDL, LDLCALC in the last 72 hours. Invalid input(s): LDL  Blood Gases: No results found for: PH, PCO2, PO2, HCO3, O2SAT  Thyroid functions:   Lab Results   Component Value Date    TSH 2.57 04/04/2019        Imaging/Diagonstics:    Ct Abdomen Pelvis Wo Contrast    Result Date: 4/4/2019  EXAMINATION: CT OF THE ABDOMEN AND PELVIS WITHOUT CONTRAST 4/2/2019 2:16 pm TECHNIQUE: CT of the abdomen and pelvis was performed without the administration of intravenous contrast. Multiplanar reformatted images are provided for review. Dose modulation, iterative reconstruction, and/or weight based adjustment of the mA/kV was utilized to reduce the radiation dose to as low as reasonably achievable. COMPARISON: None HISTORY: ORDERING SYSTEM PROVIDED HISTORY: flank pain TECHNOLOGIST PROVIDED HISTORY: Ordering Physician Provided Reason for Exam: lt side flank ellie x 10 days Acuity: Unknown Type of Exam: Unknown Relevant Medical/Surgical History: mult abd surgeries, hx colon ca FINDINGS: Lower Chest: Lung bases are clear. Heart is not enlarged. No pericardial effusion. Organs: No suspicious hepatic lesions. Lobulated fluid density lesion in hepatic segment 6 is most likely a cyst and requires no additional imaging follow-up. Cholecystectomy clips. Normal spleen, pancreas, adrenal glands, and right kidney.   There is left-sided hydronephrosis. A surgical clip is in close proximity to the left ureter and there is a possible 2-3 mm nonobstructing calculus in the distal left ureter (series 2, image 130. GI/Bowel: Postoperative changes from bowel resection with anastomosis. No dilated bowel. A right lower quadrant colostomy is present. Subtotal colectomy evident. There is no evidence of mass. Minimal hiatal hernia. Pelvis: Absent uterus. Bladder is normal for degree of distention. Peritoneum/Retroperitoneum: Atherosclerotic changes of aorta. No lymphadenopathy. No free fluid. No free air. Bones/Soft Tissues: Atrophy of the rectus musculature. Right lower quadrant colostomy. Mild pelvic muscle atrophy. No fluid collections in the abdominal wall. Multilevel degenerative changes with endplate irregularity of L2 suspicious of age-indeterminate compression deformity. 1. Left-sided hydronephrosis with associated 2-3 mm distal left ureteral calculus. 2. Status post colectomy with right lower quadrant colostomy. 3. Minimal hiatal hernia. Xr Shoulder Left (min 2 Views)    Result Date: 4/6/2019  EXAMINATION: 3 XRAY VIEWS OF THE LEFT SHOULDER 4/6/2019 6:44 pm COMPARISON: None. HISTORY: ORDERING SYSTEM PROVIDED HISTORY: pain TECHNOLOGIST PROVIDED HISTORY: pain Ordering Physician Provided Reason for Exam: pain Acuity: Acute Type of Exam: Initial Mechanism of Injury: Posterior left shoulder pain. Jerking injury. Pt backpack slung on one shoulder, other strap caught an object and jerked her backwards. Relevant Medical/Surgical History: Posterior left shoulder pain. Jerking injury. Pt backpack slung on one shoulder, other strap caught an object and jerked her backwards. FINDINGS: Glenohumeral joint is normally aligned. No evidence of acute fracture or dislocation. No abnormal periarticular calcifications. The [facility] joint is unremarkable in appearance. Visualized lung is unremarkable. No acute abnormality.      Vl Dup Lower Extremity Venous Left    Result Date: 4/2/2019    [facility]  Vascular Lower Extremities DVT Study Procedure   Patient Name   Veda Tovar  Date of Study           04/02/2019                 R   Date of Birth  1960  Gender                  Female   Age            59 year(s)  Race                       Room Number    0222   Corporate ID # [id number]   Patient Acct # [de-identified]   MR #           [id number]      Sonographer             Lindsey Katz RVT   Accession #    [id number]   Interpreting Physician  Taty Leone   Referring                  Referring Physician     Justice Barnard, CNP  Nurse  Practitioner  Procedure Type of Study:   Veins: Lower Extremities DVT Study, Venous Scan Lower Left. Indications for Study:Swelling. Patient Status:ER. Technical Quality:Adequate visualization. Conclusions   Summary   No evidence of superficial or deep venous thrombosis in the left lower  extremity and right common femoral vein. Signature   ----------------------------------------------------------------  Electronically signed by Lindsey Katz RVT(Sonographer) on  04/02/2019 05:19 PM  ----------------------------------------------------------------   ----------------------------------------------------------------  Electronically signed by Violeta ShahInterpreting  physician) on 04/02/2019 08:45 PM  ----------------------------------------------------------------  Findings:   Right Impression:                Left Impression:  The common femoral vein          The common femoral, femoral, popliteal  demonstrates normal              and tibial veins demonstrate normal  compressibility and              compressibility and augmentation. augmentation. Normal compressibility of the great                                   saphenous vein. Normal compressibility of the small                                   saphenous vein. Velocities are measured in cm/s ; Diameters are measured in cm Right Lower Extremities DVT Study Measurements Right 2D Measurements +------------------------------------+----------+---------------+----------+ ! Location                            ! Visualized! Compressibility! Thrombosis! +------------------------------------+----------+---------------+----------+ ! Common Femoral                      !Yes       ! Yes            ! None      ! +------------------------------------+----------+---------------+----------+ Left Lower Extremities DVT Study Measurements Left 2D Measurements +------------------------------------+----------+---------------+----------+ ! Location                            ! Visualized! Compressibility! Thrombosis! +------------------------------------+----------+---------------+----------+ ! Common Femoral                      !Yes       ! Yes            ! None      ! +------------------------------------+----------+---------------+----------+ ! Prox Femoral                        !Yes       ! Yes            ! None      ! +------------------------------------+----------+---------------+----------+ ! Mid Femoral                         !Yes       ! Yes            ! None      ! +------------------------------------+----------+---------------+----------+ ! Dist Femoral                        !Yes       ! Yes            ! None      ! +------------------------------------+----------+---------------+----------+ ! Deep Femoral                        !Yes       ! Yes            ! None      ! +------------------------------------+----------+---------------+----------+ ! Popliteal                           !Yes       ! Yes            ! None      ! +------------------------------------+----------+---------------+----------+ ! Sapheno Femoral Junction            ! Yes       ! Yes            ! None      ! +------------------------------------+----------+---------------+----------+ ! PTV                                 ! Yes       ! Yes !None      ! +------------------------------------+----------+---------------+----------+ ! Peroneal                            !Yes       ! Yes            ! None      ! +------------------------------------+----------+---------------+----------+ ! Gastroc                             ! Yes       ! Yes            ! None      ! +------------------------------------+----------+---------------+----------+ ! GSV Thigh                           ! Yes       ! Yes            ! None      ! +------------------------------------+----------+---------------+----------+ ! GSV Knee                            ! Yes       ! Yes            ! None      ! +------------------------------------+----------+---------------+----------+ ! GSV Ankle                           ! Yes       ! Yes            ! None      ! +------------------------------------+----------+---------------+----------+ ! SSV                                 ! Yes       ! Yes            ! None      ! +------------------------------------+----------+---------------+----------+        ASSESSMENT:    Patient Active Problem List   Diagnosis    Severe major depressive disorder [address] [address]    Major depression, chronic    Hydronephrosis      lt hydronephrosis   Small calculi   no hematuria   no uri   needs urology eval     htn controlled    k nl      Cp  ? Related to back pain   PLAN:     cont same meds     bp controlled    no meds     urology to see    MD [facility]  [address] [place] [address], [address] [place] [address].    Phone [phone]   Fax: [phone]  Answering Service: [phone]

## 2019-04-09 NOTE — PLAN OF CARE
Problem: Falls - Risk of: Intervention: Assess potential safety hazards  Note:   Pt remains free of falls and verbalizes understanding of individual fall risks. Pt wearing non skid footwear and encouraged to seek out staff for any assistance needed. Problem: Altered Mood, Depressive Behavior:  Goal: Able to verbalize and/or display a decrease in depressive symptoms  Description  Able to verbalize and/or display a decrease in depressive symptoms  Outcome: Ongoing  Note:   Patient verbalizes depression and rates it @ 9/10 and anxiety @ 7/10. Patient also reports poor sleep. Safe environment maintained. Q15 minute checks for safety continued per unit policy. Will continue to monitor for safety and provide support and reassurance as needed. Goal: Ability to disclose and discuss suicidal ideas will improve  Description  Ability to disclose and discuss suicidal ideas will improve  Outcome: Ongoing  Note:   Pt denied thoughts of suicide or self-harm and agreed to seek out staff should thoughts of suicide or self-harm arise. Safe environment maintained. Q15 minute checks for safety continued per unit policy. Will continue to monitor for safety and provide support and reassurance as needed.

## 2019-04-09 NOTE — CARE COORDINATION
- Writer meets with PT to discuss discharge planning. PT states is able to return back to live with her brother for a period of time but would like to eventually get her own apartment.  - PT is currently collecting information to meet with a  to get signed up for SSI. This is in the beginning stages and having no income is causing PT to be depressed. - PT is also concerned about paying for hospital stay and writer informs her Medicaid is pending and more than likely it will become confirmed. Writer states she will receive a letter in the mail with the next steps. - Writer calls CHELY Brennan to assist with SPDAT application. Beto Aguirre will be in on Wednesday, 4/10 in the morning to meet with PT.  - PT would like to be linked with [facility] mental health and [facility] programming.

## 2019-04-09 NOTE — GROUP NOTE
Group Therapy Note    Date: April 9    Group Start Time: 1100  Group End Time: 2467  Group Topic: Psychoeducation    [facility]    Tori Dias, CTRS    Pt did not attend RT group at 1100 d/t resting in room despite staff invitation to attend.           Signature:  Kenn Alvarado

## 2019-04-09 NOTE — PROGRESS NOTES
Consulted for Ileostomy Care and Teaching          History: creation of ileostomy more than 10 years ago at [facility] due to cancer. Has been using 2 piece bags, originally with convexity, without issues but then changed to appliances without convexity due to less cost. States wear time of 1-2 days with new appliances without convexity vs. 4 day wear time with appliances with convexity        OSTOMY ASSESSMENT:  Location RLQ  Size of stoma:    Round, 7/8 inch        Height  Protruding  Color      Beefy red  Mucocutaneous junction intact  Peristomal skin denuded circumferentially states when arrived, staff placed new bag but hole in wafer was very large  Stent/Catheter/Bridge no  Output:      Brown, liquid      Equipment used   Marine one piece cut to fit appliance with velcro closure, 1 inch cutting surface, crusting procedure used to skin with stoma powder and no sting barrier wipes. Plan of care: patient states she currently has no insurance. Will assess wear time of 1 piece appliance. On line companies charge average of $60.00-$70.00 per 10 bags of 1 piece convex appliance. Stoma protrudes slightly so could try 1 piece flexible appliance as the cost is slightly less for 10 bags. Will follow    Ostomy care:  Cut barrier to fit stoma with no more than 1/8\" of exposed skin  Apply an [employer] Barrier Ring to the cut edge of the pouching system. Empty the pouch when 1/3 to 1/2 full. Change the pouching system twice weekly and prn  Our goal is to keep the skin around the stoma intact and to have a dependable pouching system without leaks. The skin around the stoma should be intact and appear just like the skin on the opposite side of the abdomen.

## 2019-04-09 NOTE — GROUP NOTE
Group Therapy Note    Date: April 9    Group Start Time: 1600  Group End Time: 1630  Group Topic: Healthy Living/Wellness    [facility]    Best Greene LPN        Group Therapy Note    Attendees: 3/4         Patient's Goal:  Wellness education and understanding  Notes:   Active participation    Status After Intervention:  Improved    Participation Level: Interactive    Participation Quality: Appropriate      Speech:  normal      Thought Process/Content: Logical      Affective Functioning: Congruent      Mood: anxious      Level of consciousness:  Alert      Response to Learning: Able to verbalize current knowledge/experience      Endings: None Reported    Modes of Intervention: Education      Discipline Responsible: Licensed Practical Nurse      Signature:  Best Greene LPN

## 2019-04-10 PROCEDURE — 6370000000 HC RX 637 (ALT 250 FOR IP): Performed by: PSYCHIATRY & NEUROLOGY

## 2019-04-10 PROCEDURE — 6370000000 HC RX 637 (ALT 250 FOR IP): Performed by: NURSE PRACTITIONER

## 2019-04-10 PROCEDURE — 99231 SBSQ HOSP IP/OBS SF/LOW 25: CPT | Performed by: INTERNAL MEDICINE

## 2019-04-10 PROCEDURE — 1240000000 HC EMOTIONAL WELLNESS R&B

## 2019-04-10 PROCEDURE — 99232 SBSQ HOSP IP/OBS MODERATE 35: CPT | Performed by: PSYCHIATRY & NEUROLOGY

## 2019-04-10 RX ORDER — QUETIAPINE FUMARATE 25 MG/1
25 TABLET, FILM COATED ORAL NIGHTLY
Status: DISCONTINUED | OUTPATIENT
Start: 2019-04-10 | End: 2019-04-12

## 2019-04-10 RX ADMIN — ARIPIPRAZOLE 10 MG: 10 TABLET ORAL at 08:18

## 2019-04-10 RX ADMIN — BUSPIRONE HYDROCHLORIDE 5 MG: 5 TABLET ORAL at 14:34

## 2019-04-10 RX ADMIN — DIPHENHYDRAMINE HCL 50 MG: 25 TABLET ORAL at 21:10

## 2019-04-10 RX ADMIN — HYDROXYZINE HYDROCHLORIDE 25 MG: 25 TABLET, FILM COATED ORAL at 08:18

## 2019-04-10 RX ADMIN — TAMSULOSIN HYDROCHLORIDE 0.4 MG: 0.4 CAPSULE ORAL at 08:18

## 2019-04-10 RX ADMIN — BUSPIRONE HYDROCHLORIDE 5 MG: 5 TABLET ORAL at 21:10

## 2019-04-10 RX ADMIN — QUETIAPINE FUMARATE 25 MG: 25 TABLET ORAL at 21:10

## 2019-04-10 RX ADMIN — BUPROPION HYDROCHLORIDE 100 MG: 100 TABLET, FILM COATED ORAL at 08:18

## 2019-04-10 RX ADMIN — SERTRALINE HYDROCHLORIDE 100 MG: 100 TABLET ORAL at 08:18

## 2019-04-10 RX ADMIN — HYDROXYZINE HYDROCHLORIDE 25 MG: 25 TABLET, FILM COATED ORAL at 18:27

## 2019-04-10 RX ADMIN — BUSPIRONE HYDROCHLORIDE 5 MG: 5 TABLET ORAL at 08:18

## 2019-04-10 NOTE — BH NOTE
Department of Psychiatry  Attending Progress Note  Chief Complaint: Severe major depressive disorder ([address]     SUBJECTIVE:    Patient complains of depression agitation and anxiety. She said that she is unable to focus or concentrate. She has suicidal thoughts. She occasionally hears mumbling voices. She complains of lack of sleep and she said every night she keeps on waking up several times and that she is unable to rest and she never had a single night of sleep in the last few days. She feels hopeless useless and worthless. OBJECTIVE    Physical  /78   Pulse 65   Temp 98.1 °F (36.7 °C) (Oral)   Resp 16   Ht 5' 2\" (1.575 m)   Wt 180 lb (81.6 kg)   LMP  (Exact Date)   SpO2 98%   Breastfeeding?  No   BMI 32.92 kg/m²      Mental Status Evaluation:  Orientation: alertness: alert   Mood:. anxious and constricted      Affect:  constricted      Appearance:  age appropriate   Activity:  Psychomotor Agitation   Gait/Posture: Normal   Speech:  delayed, increased latency of response, normal pitch and normal volume   Thought Process:  circumstantial   Thought Content:  hallucinations   Sensorium:  person and place   Cognition:  grossly intact   Memory: intact   Insight:  limited   Judgment: limited   Suicidal Ideations: passive   Homicidal Ideations: Negative for homicidal ideation      Medication Side Effects: absent       Attention Span attention span appeared shorter than expected for age     Medications  Current Facility-Administered Medications   Medication Dose Route Frequency Provider Last Rate Last Dose    QUEtiapine (SEROQUEL) tablet 25 mg  25 mg Oral Nightly Rudy HEAD MD        ARIPiprazole (ABILIFY) tablet 10 mg  10 mg Oral Daily Rudy HEAD MD   10 mg at 04/10/19 0818    buPROPion (WELLBUTRIN) tablet 100 mg  100 mg Oral Daily Rudy HEAD MD   100 mg at 04/10/19 0818    busPIRone (BUSPAR) tablet 5 mg  5 mg Oral TID Alfonzo Hood MD   5 mg at 04/10/19 9044  diphenhydrAMINE (BENADRYL) tablet 50 mg  50 mg Oral Nightly PRN Adi HEAD MD   50 mg at 04/09/19 2133    melatonin ER tablet 1 mg  1 mg Oral Nightly PRN Andrew Mcgovern MD   1 mg at 04/09/19 2133    sertraline (ZOLOFT) tablet 100 mg  100 mg Oral Daily Lit Jones MD   100 mg at 04/10/19 0818    dicyclomine (BENTYL) tablet 20 mg  20 mg Oral 4x Daily PRN Lit Jones MD   20 mg at 04/06/19 1850    ondansetron (ZOFRAN) tablet 4 mg  4 mg Oral Q8H PRN TERRI Pike CNP   4 mg at 04/06/19 0400    acetaminophen (TYLENOL) tablet 650 mg  650 mg Oral Q4H PRN TERRI Pike CNP   650 mg at 04/05/19 1539    hydrOXYzine (ATARAX) tablet 25 mg  25 mg Oral TID PRN TERRI Pike CNP   25 mg at 04/10/19 0818    benztropine mesylate (COGENTIN) injection 2 mg  2 mg Intramuscular BID PRN TERRI Pike CNP        magnesium hydroxide (MILK OF MAGNESIA) 400 MG/5ML suspension 30 mL  30 mL Oral Daily PRN TERRI Pike CNP        aluminum & magnesium hydroxide-simethicone (MAALOX) 200-200-20 MG/5ML suspension 30 mL  30 mL Oral Q6H PRN TERRI Pike CNP        nicotine polacrilex (NICORETTE) gum 2 mg  2 mg Oral Q2H PRN TERRI Pike CNP        haloperidol lactate (HALDOL) injection 5 mg  5 mg Intramuscular Q4H PRN TERRI Pike CNP        ibuprofen (ADVIL;MOTRIN) tablet 800 mg  800 mg Oral TID PRN TERRI Pike CNP   800 mg at 04/09/19 2023    tamsulosin (FLOMAX) capsule 0.4 mg  0.4 mg Oral Daily TERRI Pike CNP   0.4 mg at 04/10/19 0818    miconazole (MICOTIN) 2 % cream   Topical BID TERRI Shaver CNP             QUEtiapine  25 mg Oral Nightly    ARIPiprazole  10 mg Oral Daily    buPROPion  100 mg Oral Daily    busPIRone  5 mg Oral TID    sertraline  100 mg Oral Daily    tamsulosin  0.4 mg Oral Daily    miconazole   Topical BID       ASSESSMENT  Severe major depressive disorder ([address]     Patient's Response to Treatment: positive    PLAN  Dragon voice recognition software used in portions of this document. Plan: To start her on Seroquel 25 MG at bedtime.

## 2019-04-10 NOTE — PLAN OF CARE
Problem: Falls - Risk of: Intervention: Assess potential safety hazards  Note:   Pt remains free of falls and verbalizes understanding of individual fall risks. Pt wearing non skid footwear and encouraged to seek out staff for any assistance needed. Problem: Altered Mood, Depressive Behavior:  Goal: Able to verbalize and/or display a decrease in depressive symptoms  Description  Able to verbalize and/or display a decrease in depressive symptoms  Outcome: Ongoing  Note:   Patient verbalizes high depression and anxiety but states it is slowly improving. Continues to verbalize poor sleep, spends majority of day isolative to room but social with peers when out. Encouraged to attend groups to work on coping skills. Will continue to provide support and encouragement as needed. Safety maintained. Goal: Ability to disclose and discuss suicidal ideas will improve  Description  Ability to disclose and discuss suicidal ideas will improve  Outcome: Ongoing  Note:   Pt denied thoughts of suicide or self-harm and agreed to seek out staff should thoughts of suicide or self-harm arise. Safe environment maintained. Q15 minute checks for safety continued per unit policy. Will continue to monitor for safety and provide support and reassurance as needed.

## 2019-04-10 NOTE — PROGRESS NOTES
habits, dysuria, trouble voiding, hematuria. Lt cva / pain   · Musculoskeletal: Negative for gait disturbance, weakness, joint complaints. · Integumentary: Negative for rash, pruritis. · Neurological: Negative for headache, dizziness, change in muscle strength, numbness/tingling, change in gait, balance, coordination,   · Endocrine: negative for temperature intolerance, excessive thirst, fluid intake, or urination, tremor. · Hematologic/Lymphatic: negative for abnormal bruising or bleeding, blood clots, swollen lymph nodes. · Allergic/Immunologic: negative for nasal congestion, pruritis, hives. PHYSICAL EXAM:    /78   Pulse 65   Temp 98.1 °F (36.7 °C) (Oral)   Resp 16   Ht 5' 2\" (1.575 m)   Wt 180 lb (81.6 kg)   LMP  (Exact Date)   SpO2 98%   Breastfeeding? No   BMI 32.92 kg/m²      · General appearance: well nourished  · HEENT: Head: Normocephalic, no lesions, without obvious abnormality. · Lungs: clear to auscultation bilaterally  · Heart: regular rate and rhythm, S1, S2 normal, no murmur, click, rub or gallop  · Abdomen: soft, non-tender; bowel sounds normal; no masses,  no organomegalytender lt cva   ·   · Extremities: extremities normal, atraumatic, no cyanosis or edema  · Neurological: Gait normal. Reflexes normal and symmetric. Sensation grossly normal  · Skin - no rash, no lump   · Eye no icterus no redness  · Lymphatic system-no lymphadenopathy no splenomegaly       DIAGNOSTICS:    Laboratory Testing:  CBC: No results for input(s): WBC, HGB, PLT in the last 72 hours. BMP:  No results for input(s): NA, K, CL, CO2, BUN, CREATININE, GLUCOSE in the last 72 hours. S. Calcium:No results for input(s): CALCIUM in the last 72 hours. S. Ionized Calcium:No results for input(s): IONCA in the last 72 hours. S. Magnesium:No results for input(s): MG in the last 72 hours. S. Phosphorus:No results for input(s): PHOS in the last 72 hours.   S. Glucose:No results for input(s): POCGLU in the last 72 hours. Glycosylated hemoglobin A1C: No results for input(s): LABA1C in the last 72 hours. INR: No results for input(s): INR in the last 72 hours. Hepatic functions: No results for input(s): ALKPHOS, ALT, AST, PROT, BILITOT, BILIDIR, LABALBU in the last 72 hours. Pancreatic functions:No results for input(s): LACTA, AMYLASE in the last 72 hours. S. Lactic Acid: No results for input(s): LACTA in the last 72 hours. Cardiac enzymes:No results for input(s): CKTOTAL, CKMB, CKMBINDEX, TROPONINI in the last 72 hours. BNP:No results for input(s): BNP in the last 72 hours. Lipid profile: No results for input(s): CHOL, TRIG, HDL, LDLCALC in the last 72 hours. Invalid input(s): LDL  Blood Gases: No results found for: PH, PCO2, PO2, HCO3, O2SAT  Thyroid functions:   Lab Results   Component Value Date    TSH 2.57 04/04/2019        Imaging/Diagonstics:    Ct Abdomen Pelvis Wo Contrast    Result Date: 4/4/2019  EXAMINATION: CT OF THE ABDOMEN AND PELVIS WITHOUT CONTRAST 4/2/2019 2:16 pm TECHNIQUE: CT of the abdomen and pelvis was performed without the administration of intravenous contrast. Multiplanar reformatted images are provided for review. Dose modulation, iterative reconstruction, and/or weight based adjustment of the mA/kV was utilized to reduce the radiation dose to as low as reasonably achievable. COMPARISON: None HISTORY: ORDERING SYSTEM PROVIDED HISTORY: flank pain TECHNOLOGIST PROVIDED HISTORY: Ordering Physician Provided Reason for Exam: lt side flank ellie x 10 days Acuity: Unknown Type of Exam: Unknown Relevant Medical/Surgical History: mult abd surgeries, hx colon ca FINDINGS: Lower Chest: Lung bases are clear. Heart is not enlarged. No pericardial effusion. Organs: No suspicious hepatic lesions. Lobulated fluid density lesion in hepatic segment 6 is most likely a cyst and requires no additional imaging follow-up. Cholecystectomy clips. Normal spleen, pancreas, adrenal glands, and right kidney. There is left-sided hydronephrosis. A surgical clip is in close proximity to the left ureter and there is a possible 2-3 mm nonobstructing calculus in the distal left ureter (series 2, image 130. GI/Bowel: Postoperative changes from bowel resection with anastomosis. No dilated bowel. A right lower quadrant colostomy is present. Subtotal colectomy evident. There is no evidence of mass. Minimal hiatal hernia. Pelvis: Absent uterus. Bladder is normal for degree of distention. Peritoneum/Retroperitoneum: Atherosclerotic changes of aorta. No lymphadenopathy. No free fluid. No free air. Bones/Soft Tissues: Atrophy of the rectus musculature. Right lower quadrant colostomy. Mild pelvic muscle atrophy. No fluid collections in the abdominal wall. Multilevel degenerative changes with endplate irregularity of L2 suspicious of age-indeterminate compression deformity. 1. Left-sided hydronephrosis with associated 2-3 mm distal left ureteral calculus. 2. Status post colectomy with right lower quadrant colostomy. 3. Minimal hiatal hernia. Xr Shoulder Left (min 2 Views)    Result Date: 4/6/2019  EXAMINATION: 3 XRAY VIEWS OF THE LEFT SHOULDER 4/6/2019 6:44 pm COMPARISON: None. HISTORY: ORDERING SYSTEM PROVIDED HISTORY: pain TECHNOLOGIST PROVIDED HISTORY: pain Ordering Physician Provided Reason for Exam: pain Acuity: Acute Type of Exam: Initial Mechanism of Injury: Posterior left shoulder pain. Jerking injury. Pt backpack slung on one shoulder, other strap caught an object and jerked her backwards. Relevant Medical/Surgical History: Posterior left shoulder pain. Jerking injury. Pt backpack slung on one shoulder, other strap caught an object and jerked her backwards. FINDINGS: Glenohumeral joint is normally aligned. No evidence of acute fracture or dislocation. No abnormal periarticular calcifications. The [facility] joint is unremarkable in appearance. Visualized lung is unremarkable. No acute abnormality. Vl Dup Lower Extremity Venous Left    Result Date: 4/2/2019    [facility]  Vascular Lower Extremities DVT Study Procedure   Patient Name   Veda Tovar  Date of Study           04/02/2019                 R   Date of Birth  1960  Gender                  Female   Age            59 year(s)  Race                       Room Number    0222   Corporate ID # [id number]   Patient Acct # [de-identified]   MR #           [id number]      Sonographer             Lindsey Katz RVT   Accession #    [id number]   Interpreting Physician  Taty Leone   Referring                  Referring Physician     Justice Barnard, CNP  Nurse  Practitioner  Procedure Type of Study:   Veins: Lower Extremities DVT Study, Venous Scan Lower Left. Indications for Study:Swelling. Patient Status:ER. Technical Quality:Adequate visualization. Conclusions   Summary   No evidence of superficial or deep venous thrombosis in the left lower  extremity and right common femoral vein. Signature   ----------------------------------------------------------------  Electronically signed by Lindsey Katz RVT(Sonographer) on  04/02/2019 05:19 PM  ----------------------------------------------------------------   ----------------------------------------------------------------  Electronically signed by Violeta Dick(Interpreting  physician) on 04/02/2019 08:45 PM  ----------------------------------------------------------------  Findings:   Right Impression:                Left Impression:  The common femoral vein          The common femoral, femoral, popliteal  demonstrates normal              and tibial veins demonstrate normal  compressibility and              compressibility and augmentation. augmentation. Normal compressibility of the great                                   saphenous vein.                                     Normal compressibility of the small saphenous vein. Velocities are measured in cm/s ; Diameters are measured in cm Right Lower Extremities DVT Study Measurements Right 2D Measurements +------------------------------------+----------+---------------+----------+ ! Location                            ! Visualized! Compressibility! Thrombosis! +------------------------------------+----------+---------------+----------+ ! Common Femoral                      !Yes       ! Yes            ! None      ! +------------------------------------+----------+---------------+----------+ Left Lower Extremities DVT Study Measurements Left 2D Measurements +------------------------------------+----------+---------------+----------+ ! Location                            ! Visualized! Compressibility! Thrombosis! +------------------------------------+----------+---------------+----------+ ! Common Femoral                      !Yes       ! Yes            ! None      ! +------------------------------------+----------+---------------+----------+ ! Prox Femoral                        !Yes       ! Yes            ! None      ! +------------------------------------+----------+---------------+----------+ ! Mid Femoral                         !Yes       ! Yes            ! None      ! +------------------------------------+----------+---------------+----------+ ! Dist Femoral                        !Yes       ! Yes            ! None      ! +------------------------------------+----------+---------------+----------+ ! Deep Femoral                        !Yes       ! Yes            ! None      ! +------------------------------------+----------+---------------+----------+ ! Popliteal                           !Yes       ! Yes            ! None      ! +------------------------------------+----------+---------------+----------+ ! Sapheno Femoral Junction            ! Yes       ! Yes            ! None      ! +------------------------------------+----------+---------------+----------+ ! PTV !Yes       !Yes            ! None      ! +------------------------------------+----------+---------------+----------+ ! Peroneal                            !Yes       ! Yes            ! None      ! +------------------------------------+----------+---------------+----------+ ! Gastroc                             ! Yes       ! Yes            ! None      ! +------------------------------------+----------+---------------+----------+ ! GSV Thigh                           ! Yes       ! Yes            ! None      ! +------------------------------------+----------+---------------+----------+ ! GSV Knee                            ! Yes       ! Yes            ! None      ! +------------------------------------+----------+---------------+----------+ ! GSV Ankle                           ! Yes       ! Yes            ! None      ! +------------------------------------+----------+---------------+----------+ ! SSV                                 ! Yes       ! Yes            ! None      ! +------------------------------------+----------+---------------+----------+        ASSESSMENT:    Patient Active Problem List   Diagnosis    Severe major depressive disorder ([address]    Major depression, chronic    Hydronephrosis      lt hydronephrosis   Small calculi   no hematuria   no uri   needs urology eval     htn controlled    k nl      Cp  ? Related to back pain   PLAN:     cont same meds     bp controlled    no meds     urology to see     4/10   pain persists   no fever    Cr nl    No hematuria   awaiting urology to see    MD CLAIRE Ny [address], [address] [place] [address].    Phone [phone]   Fax: [phone]  Answering Service: [phone]

## 2019-04-10 NOTE — GROUP NOTE
Group Therapy Note    Date: April 10, 2019    Group Start Time: 0845  Group End Time: 0920  Group Topic: Community Meeting    [facility]    Ursula Henry, [address]         Group Therapy Note    Attendees: 9/18           Patient's Goal:  Goal:  Patient will demonstrate increased interpersonal interaction     Notes:  Pt was pleasant, cooperative, and able to explore/identify short term goals and benefits    Status After Intervention:  Improved    Participation Level:  Active Listener and Interactive    Participation Quality: Appropriate, Attentive, Sharing and Supportive      Speech:  normal      Thought Process/Content: Logical  Linear      Affective Functioning: Congruent      Mood: euthymic      Level of consciousness:  Alert, Oriented x4 and Attentive      Response to Learning: Able to verbalize current knowledge/experience, Able to verbalize/acknowledge new learning, Able to retain information, Capable of insight and Progressing to goal      Endings: None Reported    Modes of Intervention: Education, Support, Exploration, Clarifying and Problem-solving      Discipline Responsible: Psychoeducational Specialist      Signature:  Jim Piedra

## 2019-04-10 NOTE — CARE COORDINATION
Lincoln Ho came out to meet with PT regarding [facility] housing, [facility] housing and other options to help PT obtain her own apartment

## 2019-04-10 NOTE — PLAN OF CARE
[facility] Interdisciplinary Treatment Plan Note     Review Date & Time: 4/10/2019 128pm    Admission Type:   Admission Type: Voluntary    Reason for admission:  Reason for Admission: Patient suicidal with plan to OD on pills,  3 years ago and got a settlement and has spent it all on crack cocaine. Estimated Length of Stay :  5-7 days  Estimated Discharge Date Update: to be determined by physician    PATIENT STRENGTHS:  Patient Strengths:Strengths: Communication, Motivated, Positive Support, Social Skills  Patient Strengths and Limitations:Limitations: Perceives need for assistance with self-care, Tendency to isolate self, Inappropriate/potentially harmful leisure interests, Difficulty problem solving/relies on others to help solve problems, Difficult relationships / poor social skills  Addictive Behavior:Addictive Behavior  In the past 3 months, have you felt or has someone told you that you have a problem with:  : None  Do you have a history of Chemical Use?: No  Do you have a history of Alcohol Use?: No  Do you have a history of Street Drug Abuse?: No  Histroy of Prescripton Drug Abuse?: No  Medical Problems:   Past Medical History:   Diagnosis Date    Cancer ([facility] [address] [facility]     Hypertension     Thyroid disease        Risk:  Fall RiskTotal: 82  Kenji Scale Kenji Scale Score: 22  BVC Total: 0    Change in scores no.  Changes to plan of Care no    Status EXAM:   Status and Exam  Normal: No  Facial Expression: Flat  Affect: Inappropriate  Level of Consciousness: Alert  Mood:Normal: No  Mood: Depressed, Anxious  Motor Activity:Normal: No  Motor Activity: Decreased  Interview Behavior: Cooperative  Preception: [place] to Person, [place] to Time, [place] to Place, [place] to Situation  Attention:Normal: No  Attention: Distractible  Thought Processes: Circumstantial  Thought Content:Normal: No  Thought Content: Preoccupations  Hallucinations: None  Delusions: No  Memory:Normal: Yes  Memory: Poor Recent  Insight and Judgment: Yes  Insight and Judgment: Poor Judgment, Poor Insight  Present Suicidal Ideation: No  Present Homicidal Ideation: No    Daily Assessment Last Entry:   Daily Sleep (WDL): Within Defined Limits         Patient Currently in Pain: Yes  Daily Nutrition (WDL): Within Defined Limits    Patient Monitoring:  Frequency of Checks: 4 times per hour, close    Psychiatric Symptoms:   Depression Symptoms  Depression Symptoms: Impaired concentration, Loss of interest  Anxiety Symptoms  Anxiety Symptoms: Generalized  [place] Symptoms  [place] Symptoms: No problems reported or observed. Psychosis Symptoms  Delusion Type: No problems reported or observed. Suicide Risk CSSR-S:  1) Within the past month, have you wished you were dead or wished you could go to sleep and not wake up? : Yes  2) Have you actually had any thoughts of killing yourself? : Yes  3) Have you been thinking about how you might kill yourself? : Yes  5) Have you started to work out or worked out the details of how to kill yourself? Do you intend to carry out this plan? : No  6) Have you ever done anything, started to do anything, or prepared to do anything to end your life?: No  Change in Result no Change in Plan of care no    EDUCATION:   Learner Progress Toward Treatment Goals: Reviewed results and recommendations of this team, Reviewed group plan and strategies, Reviewed signs, symptoms and risk of self harm and violent behavior, Reviewed goals and plan of care    Method: individual education, small group, verbal education    Outcome: verbalized understanding, but needs reinforcement to obtain goals    PATIENT GOALS:  Short term: Continue to stabilize mood  Long term: To follow up with [facility], to occupy time with something productive    PLAN/TREATMENT RECOMMENDATIONS UPDATE:   Continue with group therapies, education of coping skills   Continue to monitor patient on unit.   Medications provided/ medication compliance by patient. Continue for plans to obtain long term goals after discharge.     SHORT-TERM GOALS UPDATE:  Time frame for Short-Term Goals: 8-14days     LONG-TERM GOALS UPDATE:  Time frame for Long-Term Goals: 6 months  Members Present in Team Meeting: See Signature Sheet    Bradford Pratt

## 2019-04-11 PROCEDURE — 1240000000 HC EMOTIONAL WELLNESS R&B

## 2019-04-11 PROCEDURE — 6370000000 HC RX 637 (ALT 250 FOR IP): Performed by: PSYCHIATRY & NEUROLOGY

## 2019-04-11 PROCEDURE — 6370000000 HC RX 637 (ALT 250 FOR IP): Performed by: NURSE PRACTITIONER

## 2019-04-11 PROCEDURE — 99231 SBSQ HOSP IP/OBS SF/LOW 25: CPT | Performed by: INTERNAL MEDICINE

## 2019-04-11 PROCEDURE — 99232 SBSQ HOSP IP/OBS MODERATE 35: CPT | Performed by: PSYCHIATRY & NEUROLOGY

## 2019-04-11 RX ADMIN — HYDROXYZINE HYDROCHLORIDE 25 MG: 25 TABLET, FILM COATED ORAL at 12:14

## 2019-04-11 RX ADMIN — BUSPIRONE HYDROCHLORIDE 5 MG: 5 TABLET ORAL at 20:48

## 2019-04-11 RX ADMIN — SERTRALINE HYDROCHLORIDE 100 MG: 100 TABLET ORAL at 08:56

## 2019-04-11 RX ADMIN — HYDROXYZINE HYDROCHLORIDE 25 MG: 25 TABLET, FILM COATED ORAL at 04:04

## 2019-04-11 RX ADMIN — HYDROXYZINE HYDROCHLORIDE 25 MG: 25 TABLET, FILM COATED ORAL at 20:48

## 2019-04-11 RX ADMIN — QUETIAPINE FUMARATE 25 MG: 25 TABLET ORAL at 20:48

## 2019-04-11 RX ADMIN — BUSPIRONE HYDROCHLORIDE 5 MG: 5 TABLET ORAL at 14:31

## 2019-04-11 RX ADMIN — Medication 1 MG: at 20:48

## 2019-04-11 RX ADMIN — BUPROPION HYDROCHLORIDE 100 MG: 100 TABLET, FILM COATED ORAL at 08:56

## 2019-04-11 RX ADMIN — BUSPIRONE HYDROCHLORIDE 5 MG: 5 TABLET ORAL at 08:56

## 2019-04-11 RX ADMIN — TAMSULOSIN HYDROCHLORIDE 0.4 MG: 0.4 CAPSULE ORAL at 08:56

## 2019-04-11 RX ADMIN — ARIPIPRAZOLE 10 MG: 10 TABLET ORAL at 08:56

## 2019-04-11 NOTE — GROUP NOTE
Group Therapy Note    Date: April 11    Group Start Time: 0845  Group End Time: 4840  Group Topic: Community Meeting    RIMMA PAVON    [place], [place]      Attendees: 8    Patient's Goal:  To improve cognitive functioning and decision making skills       Status After Intervention:  Improved    Participation Level:  Active Listener and Interactive    Participation Quality: Appropriate, Attentive, Sharing and Supportive      Speech:  normal      Thought Process/Content: Logical      Affective Functioning: Congruent        Level of consciousness:  Alert, Oriented x4 and Attentive      Response to Learning: Able to verbalize current knowledge/experience, Able to verbalize/acknowledge new learning, Able to retain information and Progressing to goal      Endings: None Reported    Modes of Intervention: Education, Support and Socialization      Discipline Responsible: Psychoeducational Specialist      Signature:  Ifrah Sherman

## 2019-04-11 NOTE — GROUP NOTE
Group Therapy Note    Date: April 11, 2019    Group Start Time: 1430  Group End Time: 3926  Group Topic: Cognitive Skills    [facility]    BOSTON Vivas        Group Therapy Note    Attendees:   8 / 19           Patient's Goal:  To increase social interaction, express feelings, and explore positive coping skills    Notes: Creative Expression with discussion and sharing: Pt participated fully and was social, pleasant, and able to express feelings, identify positive ways to move forward,supports in environment, and was supportive of peers and able to relate to some of their ideas. Status After Intervention:  Improved    Participation Level:  Active Listener and Interactive    Participation Quality: Appropriate, Attentive, Sharing and Supportive      Speech:  normal      Thought Process/Content: Logical      Affective Functioning: Congruent      Mood: euthymic      Level of consciousness:  Alert, oriented x4, attentive      Response to Learning: Able to verbalize current knowledge/experience, Able to verbalize/acknowledge new learning, Able to retain information and Progressing to goal      Endings: None Reported    Modes of Intervention: Education, Support, Socialization, Exploration, Problem-solving and Activity      Discipline Responsible: Psychoeducational Specialist      Signature:  BOSTON Muñiz

## 2019-04-11 NOTE — PROGRESS NOTES
Psychoeducation Group Note    Date: 4/10/19  Start Time: 2000  End Time: 2030    Number Participants in Group:  20    Goal:  Patient will demonstrate increased interpersonal interaction.    Topic:  Safety group    Discipline Responsible:   OT  AT   x Nsg.  RT  Other       Participation Level:     None x Minimal    Active Listener  Interactive    Monopolizing         Participation Quality:  x Appropriate  Inappropriate          Attentive        Intrusive          Sharing        Resistant          Supportive        Lethargic       Affective:    Congruent  Incongruent  Blunted  Flat    Constricted  Anxious  Elated  Angry    Labile  Depressed  Other  Bright       Cognitive:  x Alert  Oriented PPTP     Concentration  G  F  P   Attention Span  G  F  P   Short-Term Memory  G  F  P   Long-Term Memory  G  F  P   ProblemSolving/  Decision Making  G  F  P   Ability to Process  Information  G  F  P      Contributing Factors             Delusional             Hallucinating             Flight of Ideas             Other:       Modes of Intervention:  x Education  Support  Exploration   x Clarifying  Problem Solving  Confrontation    Socialization  Limit Setting  Reality Testing    Activity  Movement  Media    Other:            Response to Learning:   Able to verbalize current knowledge/experience    Able to verbalize/acknowledge new learning    Able to retain information    Capable of insight    Able to change behavior    Progressing to goal    Other:        Comments: no issues found

## 2019-04-11 NOTE — BH NOTE
Department of Psychiatry  Attending Progress Note  Chief Complaint: Severe major depressive disorder ([address]     SUBJECTIVE:    Patient said that she is feeling depressed and sad. She complains of anxiety and agitation. She complains of suicidal thoughts. She complains of difficulty in maintaining sleep. She said she has 4 children. They're all over the country and 1 is in [place] 2 are in [facility] 1 is in [place]. She had no job or income. She said that she was dumb . She has some money from it which is over now. He has no place to go. She is planning to live with her brother. She said that she wants to try and apply for Social Security disability after she had the cancer and had colorectal surgery in the past about 3 years ago. OBJECTIVE    Physical  /69   Pulse 68   Temp 98.2 °F (36.8 °C)   Resp 14   Ht 5' 2\" (1.575 m)   Wt 180 lb (81.6 kg)   LMP  (Exact Date)   SpO2 98%   Breastfeeding?  No   BMI 32.92 kg/m²      Mental Status Evaluation:  Orientation: alertness: alert   Mood:. anxious and constricted      Affect:  constricted      Appearance:  age appropriate   Activity:  Psychomotor Agitation   Gait/Posture: Normal   Speech:  delayed, increased latency of response, normal pitch and normal volume   Thought Process:  circumstantial   Thought Content:  She denies any delusions and hallucinations   Sensorium:  person, place and time/date   Cognition:  grossly intact   Memory: intact   Insight:  limited   Judgment: limited   Suicidal Ideations: passive   Homicidal Ideations: Negative for homicidal ideation      Medication Side Effects: absent       Attention Span attention span appeared shorter than expected for age     Medications  Current Facility-Administered Medications   Medication Dose Route Frequency Provider Last Rate Last Dose    QUEtiapine (SEROQUEL) tablet 25 mg  25 mg Oral Nightly Rudy HEAD MD   25 mg at 04/10/19 2110    ARIPiprazole (ABILIFY) tablet 10 mg 10 mg Oral Daily Vasu HEAD MD   10 mg at 04/11/19 0856    buPROPion [facility]) tablet 100 mg  100 mg Oral Daily Vasu HEAD MD   100 mg at 04/11/19 0856    busPIRone (BUSPAR) tablet 5 mg  5 mg Oral TID Kiki Warner MD   5 mg at 04/11/19 0856    diphenhydrAMINE (BENADRYL) tablet 50 mg  50 mg Oral Nightly PRN Kiki Warner MD   50 mg at 04/10/19 2110    melatonin ER tablet 1 mg  1 mg Oral Nightly PRN Kiki Warner MD   1 mg at 04/09/19 2133    sertraline (ZOLOFT) tablet 100 mg  100 mg Oral Daily Sadi Wells MD   100 mg at 04/11/19 0856    dicyclomine (BENTYL) tablet 20 mg  20 mg Oral 4x Daily PRN Sadi Wells MD   20 mg at 04/06/19 1850    ondansetron (ZOFRAN) tablet 4 mg  4 mg Oral Q8H PRN Charlies Maspeth, APRN - CNP   4 mg at 04/06/19 0400    acetaminophen (TYLENOL) tablet 650 mg  650 mg Oral Q4H PRN Charlies Maspeth, APRN - CNP   650 mg at 04/05/19 1539    hydrOXYzine (ATARAX) tablet 25 mg  25 mg Oral TID PRN Charlies Maspeth, APRN - CNP   25 mg at 04/11/19 0404    benztropine mesylate (COGENTIN) injection 2 mg  2 mg Intramuscular BID PRN Charlies Maspeth, APRN - CNP        magnesium hydroxide (MILK OF MAGNESIA) 400 MG/5ML suspension 30 mL  30 mL Oral Daily PRN Charlies Maspeth, APRN - CNP        aluminum & magnesium hydroxide-simethicone (MAALOX) 200-200-20 MG/5ML suspension 30 mL  30 mL Oral Q6H PRN Charlies Maspeth, APRN - CNP        nicotine polacrilex (NICORETTE) gum 2 mg  2 mg Oral Q2H PRN Charlies Maspeth, APRN - CNP        haloperidol lactate (HALDOL) injection 5 mg  5 mg Intramuscular Q4H PRN Charlies Maspeth, APRN - CNP        ibuprofen (ADVIL;MOTRIN) tablet 800 mg  800 mg Oral TID PRN TERRI Moreno CNP   800 mg at 04/09/19 2023    tamsulosin (FLOMAX) capsule 0.4 mg  0.4 mg Oral Daily TERRI Moreno CNP   0.4 mg at 04/11/19 0856    miconazole (MICOTIN) 2 % cream   Topical BID TERRI Mcnamara CNP             QUEtiapine  25 mg Oral Nightly    ARIPiprazole  10 mg Oral Daily    buPROPion  100 mg Oral Daily    busPIRone  5 mg Oral TID    sertraline  100 mg Oral Daily    tamsulosin  0.4 mg Oral Daily    miconazole   Topical BID       ASSESSMENT  Severe major depressive disorder (HCC)     Patient's Response to Treatment: positive    PLAN  Dragon voice recognition software used in portions of this document. Plan to continue current management. Supportive therapy is provided.

## 2019-04-11 NOTE — PLAN OF CARE
Problem: Altered Mood, Depressive Behavior:  Goal: Ability to disclose and discuss suicidal ideas will improve  Description  Ability to disclose and discuss suicidal ideas will improve  4/11/2019 0951 by Roro Matute  Outcome: Ongoing  Note:   Pt denies any current suicidal ideations upon request this morning. Out in dayroom and selectively social. Continues to report high depression and anxiety with minimal relief. Feels she is not sleeping well at night which is why she spends long intervals in bed during the day. Eating meals well. Support and reassurance given. Encouraged to attend unit programing and take medications as prescribed. Agrees to seek out staff as needed and before harming self if negative self harm thoughts arise. Q15 minute checks for safety cont. Problem: Falls - Risk of: Intervention: Assess risk factors for falls  Note:   Pt remains free of falls and verbalizes understanding of individual fall risks. Pt wearing non skid footwear and encouraged to seek out staff for any assistance needed.

## 2019-04-11 NOTE — GROUP NOTE
Group Therapy Note    Date: April 11    Group Start Time: 1330  Group End Time: 3127  Group Topic: Recovery    [facility]    NELY Diaz, MALENA    Attendees: 9    Patient's Goals: Increase socialization and understanding of recovery process    Notes: Patient is making progress AEB participating in group discussion, actively listening, and supporting others. Status After Intervention:  Improved    Participation Level:  Active Listener and Interactive    Participation Quality: Appropriate, Attentive, Sharing and Supportive      Speech: normal      Thought Process/Content: Logical      Affective Functioning: Congruent      Mood: stable      Level of consciousness:  Alert, Oriented x4 and Attentive      Response to Learning: Able to verbalize current knowledge/experience, Able to verbalize/acknowledge new learning, Able to retain information, Capable of insight, Able to change behavior and Progressing to goal      Endings: None Reported    Modes of Intervention: Education, Support, Socialization and Problem-solving      Discipline Responsible: /Counselor      Signature:  NELY Diaz LSW

## 2019-04-11 NOTE — GROUP NOTE
Group Therapy Note    Date: April 11    Group Start Time: 1100  Group End Time: 2055  Group Topic: Psychotherapy    [facility]    Bia Baron        Group Therapy Note           Patient's Goal:  To participate in group psychotherapy and remain in the here and now. Notes:  PT was an active participant in group discussion. Status After Intervention:  Unchanged    Participation Level: Active Listener and Interactive    Participation Quality: Appropriate, Attentive and Sharing      Speech:  normal      Thought Process/Content: Logical      Affective Functioning: Congruent      Mood: depressed      Level of consciousness:  Alert, Oriented x4 and Attentive      Response to Learning: Able to verbalize current knowledge/experience, Able to verbalize/acknowledge new learning and Progressing to goal      Endings: None Reported    Modes of Intervention: Support, Socialization, Clarifying and Problem-solving      Discipline Responsible: /Counselor      Signature:   Bia Baron

## 2019-04-11 NOTE — PROGRESS NOTES
[address] [facility].                Date:   4/11/2019  Patient name:  Raquel Carreno  Date of admission:  4/2/2019  1:38 PM  MRN:   [id number]  YOB: 1960    Pt seen at the request of Jorge Fisher MD    CHIEF COMPLAINT:     History Obtained From:  Patient and chart review. HISTORY OF PRESENT ILLNESS:      The patient is a 59 y.o.  female who is admitted to the hospital for lt hydronephrosis   htn      4/10   stable   still has pain     4/11   no change  Past Medical History:   has a past medical history of Cancer ([facility] [address] Hypertension, and Thyroid disease. Past Surgical History:   has a past surgical history that includes Hysterectomy and joint replacement. Home Medications:    Prior to Admission medications    Not on File       Allergies:  Clindamycin/lincomycin; Demerol hcl [meperidine]; Flagyl [metronidazole]; and Pcn [penicillins]    Social History:   reports that she has never smoked. She has never used smokeless tobacco. She reports that she drank alcohol. She reports that she has current or past drug history. Drugs: Cocaine and Marijuana. Family History: family history includes Alcohol Abuse in her father; Cancer in her brother; Coronary Art Dis in her father; Hypertension in her brother. REVIEW OF SYSTEMS:    · Constitutional: Negative for weight loss  · Eyes: Negative for visual changes, diplopia, scleral icterus. · ENT: Negative for Headaches, hearing loss, vertigo, mouth sores, sore throat. · Cardiovascular: Negative for lightheadedness/orthostatic symptoms ,chest pain, dyspnea on exertion, palpitations or loss of consciousness. · Respiratory: Negative for cough or wheezing, sputum production, hemoptysis, pleuritic pain. · Gastrointestinal: Negative for nausea/vomiting, change in bowel habits, abdominal pain, dysphagia/appetite loss, hematemesis, blood in stools.   · Genitourinary:Negative for kidney. There is left-sided hydronephrosis. A surgical clip is in close proximity to the left ureter and there is a possible 2-3 mm nonobstructing calculus in the distal left ureter (series 2, image 130. GI/Bowel: Postoperative changes from bowel resection with anastomosis. No dilated bowel. A right lower quadrant colostomy is present. Subtotal colectomy evident. There is no evidence of mass. Minimal hiatal hernia. Pelvis: Absent uterus. Bladder is normal for degree of distention. Peritoneum/Retroperitoneum: Atherosclerotic changes of aorta. No lymphadenopathy. No free fluid. No free air. Bones/Soft Tissues: Atrophy of the rectus musculature. Right lower quadrant colostomy. Mild pelvic muscle atrophy. No fluid collections in the abdominal wall. Multilevel degenerative changes with endplate irregularity of L2 suspicious of age-indeterminate compression deformity. 1. Left-sided hydronephrosis with associated 2-3 mm distal left ureteral calculus. 2. Status post colectomy with right lower quadrant colostomy. 3. Minimal hiatal hernia. Xr Shoulder Left (min 2 Views)    Result Date: 4/6/2019  EXAMINATION: 3 XRAY VIEWS OF THE LEFT SHOULDER 4/6/2019 6:44 pm COMPARISON: None. HISTORY: ORDERING SYSTEM PROVIDED HISTORY: pain TECHNOLOGIST PROVIDED HISTORY: pain Ordering Physician Provided Reason for Exam: pain Acuity: Acute Type of Exam: Initial Mechanism of Injury: Posterior left shoulder pain. Jerking injury. Pt backpack slung on one shoulder, other strap caught an object and jerked her backwards. Relevant Medical/Surgical History: Posterior left shoulder pain. Jerking injury. Pt backpack slung on one shoulder, other strap caught an object and jerked her backwards. FINDINGS: Glenohumeral joint is normally aligned. No evidence of acute fracture or dislocation. No abnormal periarticular calcifications. The [facility] [place], operated by [facility] joint is unremarkable in appearance. Visualized lung is unremarkable.      No acute saphenous vein. Velocities are measured in cm/s ; Diameters are measured in cm Right Lower Extremities DVT Study Measurements Right 2D Measurements +------------------------------------+----------+---------------+----------+ ! Location                            ! Visualized! Compressibility! Thrombosis! +------------------------------------+----------+---------------+----------+ ! Common Femoral                      !Yes       ! Yes            ! None      ! +------------------------------------+----------+---------------+----------+ Left Lower Extremities DVT Study Measurements Left 2D Measurements +------------------------------------+----------+---------------+----------+ ! Location                            ! Visualized! Compressibility! Thrombosis! +------------------------------------+----------+---------------+----------+ ! Common Femoral                      !Yes       ! Yes            ! None      ! +------------------------------------+----------+---------------+----------+ ! Prox Femoral                        !Yes       ! Yes            ! None      ! +------------------------------------+----------+---------------+----------+ ! Mid Femoral                         !Yes       ! Yes            ! None      ! +------------------------------------+----------+---------------+----------+ ! Dist Femoral                        !Yes       ! Yes            ! None      ! +------------------------------------+----------+---------------+----------+ ! Deep Femoral                        !Yes       ! Yes            ! None      ! +------------------------------------+----------+---------------+----------+ ! Popliteal                           !Yes       ! Yes            ! None      ! +------------------------------------+----------+---------------+----------+ ! Sapheno Femoral Junction            ! Yes       ! Yes            ! None      ! +------------------------------------+----------+---------------+----------+ ! PTV !Yes       !Yes            ! None      ! +------------------------------------+----------+---------------+----------+ ! Peroneal                            !Yes       ! Yes            ! None      ! +------------------------------------+----------+---------------+----------+ ! Gastroc                             ! Yes       ! Yes            ! None      ! +------------------------------------+----------+---------------+----------+ ! GSV Thigh                           ! Yes       ! Yes            ! None      ! +------------------------------------+----------+---------------+----------+ ! GSV Knee                            ! Yes       ! Yes            ! None      ! +------------------------------------+----------+---------------+----------+ ! GSV Ankle                           ! Yes       ! Yes            ! None      ! +------------------------------------+----------+---------------+----------+ ! SSV                                 ! Yes       ! Yes            ! None      ! +------------------------------------+----------+---------------+----------+        ASSESSMENT:    Patient Active Problem List   Diagnosis    Severe major depressive disorder ([facility] [address]    Major depression, chronic    Hydronephrosis      lt hydronephrosis   Small calculi   no hematuria   no uri   needs urology eval     htn controlled    k nl      Cp  ? Related to back pain   PLAN:     cont same meds     bp controlled    no meds     urology to see     4/10   pain persists   no fever    Cr nl    No hematuria   awaiting urology to see     4/11    no change   awaiting urology to see    bp controlled  Cr nl     MD CLAIRE Gresham[address], [address] [place] [address].    Phone [phone]   Fax: [phone]  Answering Service: [phone]

## 2019-04-11 NOTE — PLAN OF CARE
Problem: Altered Mood, Depressive Behavior:  Goal: Able to verbalize and/or display a decrease in depressive symptoms  Description  Able to verbalize and/or display a decrease in depressive symptoms  4/10/2019 2015 by Rodriguez Ann RN  Outcome: Ongoing  Note:   Denies depression     Problem: Altered Mood, Depressive Behavior:  Goal: Ability to disclose and discuss suicidal ideas will improve  Description  Ability to disclose and discuss suicidal ideas will improve  4/10/2019 2015 by Rodriguez Ann RN  Outcome: Ongoing  Note:   Denies suicidal ideations

## 2019-04-12 LAB
FOLATE: 15.7 NG/ML
VITAMIN B-12: 371 PG/ML (ref 232–1245)

## 2019-04-12 PROCEDURE — 1240000000 HC EMOTIONAL WELLNESS R&B

## 2019-04-12 PROCEDURE — 82746 ASSAY OF FOLIC ACID SERUM: CPT

## 2019-04-12 PROCEDURE — 6370000000 HC RX 637 (ALT 250 FOR IP): Performed by: NURSE PRACTITIONER

## 2019-04-12 PROCEDURE — 99232 SBSQ HOSP IP/OBS MODERATE 35: CPT | Performed by: PSYCHIATRY & NEUROLOGY

## 2019-04-12 PROCEDURE — 36415 COLL VENOUS BLD VENIPUNCTURE: CPT

## 2019-04-12 PROCEDURE — 82607 VITAMIN B-12: CPT

## 2019-04-12 PROCEDURE — 99231 SBSQ HOSP IP/OBS SF/LOW 25: CPT | Performed by: INTERNAL MEDICINE

## 2019-04-12 PROCEDURE — 6370000000 HC RX 637 (ALT 250 FOR IP): Performed by: PSYCHIATRY & NEUROLOGY

## 2019-04-12 RX ADMIN — BUPROPION HYDROCHLORIDE 100 MG: 100 TABLET, FILM COATED ORAL at 08:42

## 2019-04-12 RX ADMIN — BUSPIRONE HYDROCHLORIDE 5 MG: 5 TABLET ORAL at 15:34

## 2019-04-12 RX ADMIN — BUSPIRONE HYDROCHLORIDE 5 MG: 5 TABLET ORAL at 08:42

## 2019-04-12 RX ADMIN — SERTRALINE HYDROCHLORIDE 100 MG: 100 TABLET ORAL at 08:42

## 2019-04-12 RX ADMIN — BUSPIRONE HYDROCHLORIDE 5 MG: 5 TABLET ORAL at 20:42

## 2019-04-12 RX ADMIN — TAMSULOSIN HYDROCHLORIDE 0.4 MG: 0.4 CAPSULE ORAL at 08:42

## 2019-04-12 RX ADMIN — DIPHENHYDRAMINE HCL 50 MG: 25 TABLET ORAL at 20:42

## 2019-04-12 RX ADMIN — ARIPIPRAZOLE 10 MG: 10 TABLET ORAL at 08:42

## 2019-04-12 RX ADMIN — HYDROXYZINE HYDROCHLORIDE 25 MG: 25 TABLET, FILM COATED ORAL at 20:41

## 2019-04-12 ASSESSMENT — PAIN SCALES - GENERAL: PAINLEVEL_OUTOF10: 6

## 2019-04-12 ASSESSMENT — PAIN DESCRIPTION - DESCRIPTORS: DESCRIPTORS: SORE

## 2019-04-12 ASSESSMENT — PAIN DESCRIPTION - LOCATION: LOCATION: SHOULDER

## 2019-04-12 ASSESSMENT — PAIN DESCRIPTION - ORIENTATION: ORIENTATION: LEFT

## 2019-04-12 ASSESSMENT — PAIN DESCRIPTION - PAIN TYPE: TYPE: ACUTE PAIN

## 2019-04-12 NOTE — PROGRESS NOTES
Samples of pre cut convex ostomy appliances left for patient at nurses desk. Patient aware supplies are available at desk  for home.

## 2019-04-12 NOTE — GROUP NOTE
Group Therapy Note  Pt did not participate in Cognitive Skills Group at 1430 due to pt was resting in room et declined to attend group when encouraged.

## 2019-04-12 NOTE — PLAN OF CARE
Problem: Altered Mood, Depressive Behavior:  Goal: Able to verbalize and/or display a decrease in depressive symptoms  Description  Able to verbalize and/or display a decrease in depressive symptoms  4/12/2019 1206 by Rosi Cogan, LPN  Outcome: Ongoing  Note:   Patient brightened upon approach, patient reports that's sleep has improved better than previous nights. Patient reports that thoughts are clearer and she can now participate in programming more. Instructed on and encouraged communication in regards to care, and continue to obtain coping skills while attending programming. Problem: Altered Mood, Depressive Behavior:  Goal: Ability to disclose and discuss suicidal ideas will improve  Description  Ability to disclose and discuss suicidal ideas will improve  4/12/2019 1206 by Rosi Cogan, LPN  Outcome: Ongoing  Note:   Pt denies thoughts of self harm and is agreeable to seeking out should thoughts of self harm arise. Safe environment maintained. Q15 minute checks for safety cont per unit policy. Will cont to monitor for safety and provides support and reassurance as needed.

## 2019-04-12 NOTE — BH NOTE
Department of Psychiatry  Attending Progress Note  Chief Complaint: Severe major depressive disorder ([address]     SUBJECTIVE:    The patient is a 78-year-old female. She had colorectal surgery and cancer and chemotherapy. She is feeling depressed and sad. She is confused and worried about her future. She said that there is money from the divorce is gone and she had nowhere to go. Her children live 1 in [place] in [place] want to in [facility]. She has no contact with him. She is planning to see if she can live with her brother. OBJECTIVE    Physical  BP (!) 145/60   Pulse 70   Temp 98.5 °F (36.9 °C) (Oral)   Resp 14   Ht 5' 2\" (1.575 m)   Wt 180 lb (81.6 kg)   LMP  (Exact Date)   SpO2 98%   Breastfeeding?  No   BMI 32.92 kg/m²      Mental Status Evaluation:  Orientation: alertness: alert   Mood:. anxious and constricted      Affect:  constricted      Appearance:  age appropriate   Activity:  Psychomotor Agitation   Gait/Posture: Normal   Speech:  delayed, increased latency of response, normal pitch and normal volume   Thought Process:  circumstantial   Thought Content:  delusions and hallucinations   Sensorium:  person and place   Cognition:  grossly intact   Memory: intact   Insight:  limited   Judgment: limited   Suicidal Ideations: passive   Homicidal Ideations: Negative for homicidal ideation      Medication Side Effects: absent       Attention Span attention span appeared shorter than expected for age     Medications  Current Facility-Administered Medications   Medication Dose Route Frequency Provider Last Rate Last Dose    [START ON 4/13/2019] sertraline (ZOLOFT) tablet 150 mg  150 mg Oral Daily Rudy HEAD MD        buPROPion [facility]) tablet 100 mg  100 mg Oral Daily Rudy HEAD MD   100 mg at 04/12/19 0842    busPIRone (BUSPAR) tablet 5 mg  5 mg Oral TID Luann HEAD MD   5 mg at 04/12/19 0842    diphenhydrAMINE (BENADRYL) tablet 50 mg  50 mg Oral Nightly PRN Pablo Haney MD   50 mg at 04/10/19 2110    melatonin ER tablet 1 mg  1 mg Oral Nightly PRN Pablo Haney MD   1 mg at 04/11/19 2048    dicyclomine (BENTYL) tablet 20 mg  20 mg Oral 4x Daily PRN Dotty Roy MD   20 mg at 04/06/19 1850    ondansetron (ZOFRAN) tablet 4 mg  4 mg Oral Q8H PRN Eur Falls, APRN - CNP   4 mg at 04/06/19 0400    acetaminophen (TYLENOL) tablet 650 mg  650 mg Oral Q4H PRN Eura Falls, APRN - CNP   650 mg at 04/05/19 1539    hydrOXYzine (ATARAX) tablet 25 mg  25 mg Oral TID PRN Eura Falls, APRN - [facility]   25 mg at 04/11/19 2048    benztropine mesylate (COGENTIN) injection 2 mg  2 mg Intramuscular BID PRN Eura Falls, APRN - CNP        magnesium hydroxide (MILK OF MAGNESIA) 400 MG/5ML suspension 30 mL  30 mL Oral Daily PRN Eura Falls, APRN - CNP        aluminum & magnesium hydroxide-simethicone (MAALOX) 200-200-20 MG/5ML suspension 30 mL  30 mL Oral Q6H PRN Eura Falls, APRN - CNP        nicotine polacrilex (NICORETTE) gum 2 mg  2 mg Oral Q2H PRN Eura Falls, APRN - CNP        haloperidol lactate (HALDOL) injection 5 mg  5 mg Intramuscular Q4H PRN Eura Falls APRN - [facility]        ibuprofen (ADVIL;MOTRIN) tablet 800 mg  800 mg Oral TID PRN Eura Falls, APRN - CNP   800 mg at 04/09/19 2023    tamsulosin (FLOMAX) capsule 0.4 mg  0.4 mg Oral Daily Eura Falls, APRN - CNP   0.4 mg at 04/12/19 8044    miconazole (MICOTIN) 2 % cream   Topical BID TERRI Ernst CNP            [facility] [START ON 4/13/2019] sertraline  150 mg Oral Daily    buPROPion  100 mg Oral Daily    busPIRone  5 mg Oral TID    tamsulosin  0.4 mg Oral Daily    miconazole   Topical BID       ASSESSMENT  Severe major depressive disorder ([address]     Patient's Response to Treatment: positive    PLAN  Dragon voice recognition software used in portions of this document. Plan to get MRI of her brain with and without contrast to rule out any vascular involvement and at least is a vascular dementia.   She repeatedly complains of memory deficits.

## 2019-04-12 NOTE — GROUP NOTE
Group Therapy Note    Date: April 12    Group Start Time: 1015am  Group End Time: 1055am  Group Topic: Cognitive Skills    [facility] Everette Lundborg, CTRS        Group Therapy Note    Attendees: 7/10         Patient's Goal:  To increase social interaction and engage in decision making task    Notes:  Pt was attentive,participated fully and social with peers et RT    Status After Intervention:  Improved    Participation Level:  Active Listener and Interactive    Participation Quality: Attentive, Sharing      Speech:  normal      Thought Process/Content: Logical      Affective Functioning: Congruent      Mood: euthymic     Level of consciousness:  Alert, Oriented x4 and Attentive      Response to Learning: Able to verbalize current knowledge/experience, Able to verbalize/acknowledge new learning, Able to retain information and Progressing to goal      Endings: None reported    Modes of Intervention: Education, Support, Socialization, Exploration, Clarifying, Problem-solving       Discipline Responsible: Psychoeducational Specialist      Signature:  Ramiro Munoz

## 2019-04-12 NOTE — GROUP NOTE
Group Therapy Note    Date: April 12    Group Start Time: 1115  Group End Time: 1200  Group Topic: Psychotherapy    [facility]    La Miller        Group Therapy Note             Patient's Goal:  To participate in group psychotherapy and remain in the here and now. Notes:  PT was an active participant during group discussion on this date. Status After Intervention:  Unchanged    Participation Level: Active Listener and Interactive    Participation Quality: Appropriate, Attentive and Sharing      Speech:  normal      Thought Process/Content: Logical      Affective Functioning: Congruent      Mood: depressed      Level of consciousness:  Alert, Oriented x4 and Attentive      Response to Learning: Able to verbalize current knowledge/experience, Able to verbalize/acknowledge new learning and Progressing to goal      Endings: None Reported    Modes of Intervention: Support, Socialization, Exploration and Clarifying      Discipline Responsible: /Counselor      Signature:   La Miller

## 2019-04-12 NOTE — PLAN OF CARE
Problem: Altered Mood, Depressive Behavior:  Goal: Able to verbalize and/or display a decrease in depressive symptoms  Description  Able to verbalize and/or display a decrease in depressive symptoms  4/11/2019 2334 by Bro Singh RN  Note:   PT was accepting of 1:1 meet with RN. PT was accepting of PM program. PT stated her depression as of this time stating is getting better. PT remains free from any self harm, falls, or any other type of injury as of this time. PT agreed to seek out health care staff if stressors or any other issues were to become too much. Safety checks have been maintained every 15 minutes and at irregular intervals throughout the shift. Problem: Altered Mood, Depressive Behavior:  Goal: Ability to disclose and discuss suicidal ideas will improve  Description  Ability to disclose and discuss suicidal ideas will improve  4/11/2019 2334 by Bro Singh RN  Note:   PT was accepting of 1:1 meet with RN. PT denied any current thoughts of suicidal ideations as of this time. PT agreed to seek out health care staff if stressors were to become to be too much. Safety checks have been maintained every 15 minutes and at irregular intervals throughout the shift.

## 2019-04-13 ENCOUNTER — APPOINTMENT (OUTPATIENT)
Dept: MRI IMAGING | Age: 59
DRG: 751 | End: 2019-04-13
Payer: MEDICAID

## 2019-04-13 PROCEDURE — 6370000000 HC RX 637 (ALT 250 FOR IP): Performed by: PSYCHIATRY & NEUROLOGY

## 2019-04-13 PROCEDURE — 2580000003 HC RX 258: Performed by: PSYCHIATRY & NEUROLOGY

## 2019-04-13 PROCEDURE — A9579 GAD-BASE MR CONTRAST NOS,1ML: HCPCS | Performed by: PSYCHIATRY & NEUROLOGY

## 2019-04-13 PROCEDURE — 99231 SBSQ HOSP IP/OBS SF/LOW 25: CPT | Performed by: NURSE PRACTITIONER

## 2019-04-13 PROCEDURE — 6370000000 HC RX 637 (ALT 250 FOR IP): Performed by: NURSE PRACTITIONER

## 2019-04-13 PROCEDURE — 70553 MRI BRAIN STEM W/O & W/DYE: CPT

## 2019-04-13 PROCEDURE — 1240000000 HC EMOTIONAL WELLNESS R&B

## 2019-04-13 PROCEDURE — 6360000004 HC RX CONTRAST MEDICATION: Performed by: PSYCHIATRY & NEUROLOGY

## 2019-04-13 RX ORDER — QUETIAPINE FUMARATE 100 MG/1
100 TABLET, FILM COATED ORAL NIGHTLY
Status: DISCONTINUED | OUTPATIENT
Start: 2019-04-13 | End: 2019-04-16 | Stop reason: HOSPADM

## 2019-04-13 RX ORDER — SODIUM CHLORIDE 0.9 % (FLUSH) 0.9 %
10 SYRINGE (ML) INJECTION PRN
Status: DISCONTINUED | OUTPATIENT
Start: 2019-04-13 | End: 2019-04-16 | Stop reason: HOSPADM

## 2019-04-13 RX ADMIN — Medication 1 MG: at 21:28

## 2019-04-13 RX ADMIN — TAMSULOSIN HYDROCHLORIDE 0.4 MG: 0.4 CAPSULE ORAL at 08:34

## 2019-04-13 RX ADMIN — GADOTERIDOL 20 ML: 279.3 INJECTION, SOLUTION INTRAVENOUS at 14:54

## 2019-04-13 RX ADMIN — BUPROPION HYDROCHLORIDE 100 MG: 100 TABLET, FILM COATED ORAL at 08:34

## 2019-04-13 RX ADMIN — BUSPIRONE HYDROCHLORIDE 5 MG: 5 TABLET ORAL at 13:45

## 2019-04-13 RX ADMIN — QUETIAPINE FUMARATE 100 MG: 100 TABLET ORAL at 21:28

## 2019-04-13 RX ADMIN — HYDROXYZINE HYDROCHLORIDE 25 MG: 25 TABLET, FILM COATED ORAL at 13:45

## 2019-04-13 RX ADMIN — Medication 10 ML: at 14:54

## 2019-04-13 RX ADMIN — BUSPIRONE HYDROCHLORIDE 5 MG: 5 TABLET ORAL at 21:29

## 2019-04-13 RX ADMIN — SERTRALINE HYDROCHLORIDE 150 MG: 100 TABLET ORAL at 08:34

## 2019-04-13 RX ADMIN — ACETAMINOPHEN 650 MG: 325 TABLET, FILM COATED ORAL at 19:08

## 2019-04-13 RX ADMIN — ACETAMINOPHEN 650 MG: 325 TABLET, FILM COATED ORAL at 13:45

## 2019-04-13 RX ADMIN — HYDROXYZINE HYDROCHLORIDE 25 MG: 25 TABLET, FILM COATED ORAL at 08:34

## 2019-04-13 RX ADMIN — BUSPIRONE HYDROCHLORIDE 5 MG: 5 TABLET ORAL at 08:34

## 2019-04-13 RX ADMIN — IBUPROFEN 800 MG: 800 TABLET ORAL at 11:39

## 2019-04-13 RX ADMIN — HYDROXYZINE HYDROCHLORIDE 25 MG: 25 TABLET, FILM COATED ORAL at 21:29

## 2019-04-13 ASSESSMENT — PAIN DESCRIPTION - PAIN TYPE
TYPE: ACUTE PAIN

## 2019-04-13 ASSESSMENT — PAIN DESCRIPTION - LOCATION
LOCATION: HEAD

## 2019-04-13 ASSESSMENT — PAIN SCALES - GENERAL
PAINLEVEL_OUTOF10: 0
PAINLEVEL_OUTOF10: 3
PAINLEVEL_OUTOF10: 3
PAINLEVEL_OUTOF10: 10
PAINLEVEL_OUTOF10: 1
PAINLEVEL_OUTOF10: 4

## 2019-04-13 ASSESSMENT — PAIN DESCRIPTION - DESCRIPTORS
DESCRIPTORS: HEADACHE

## 2019-04-13 NOTE — GROUP NOTE
Group Therapy Note    Date: April 12    Group Start Time: 2030  Group End Time: 2120  Group Topic: Wrap-Up    RIMMA Proras        Group Therapy Note    Attendees: 12         Patient's Goal:  Pt refused group attendnace      Signature:  Ting Porras

## 2019-04-13 NOTE — GROUP NOTE
Group Therapy Note    Date: April 13    Group Start Time: 1000  Group End Time: 1040  Group Topic: Psychoeducation    [facility]    MALENA Matias    Pt declined to attend psychoeducation at 1000 am despite encouragement. Pt offered 1:1 and refused.         Signature:  MALENA Javier

## 2019-04-13 NOTE — PROGRESS NOTES
[address] [facility].                Date:   4/12/2019  Patient name:  Niko Khan  Date of admission:  4/2/2019  1:38 PM  MRN:   [id number]  YOB: 1960    Pt seen at the request of Madison Cheung MD    CHIEF COMPLAINT:     History Obtained From:  Patient and chart review. HISTORY OF PRESENT ILLNESS:      The patient is a 59 y.o.  female who is admitted to the hospital for lt hydronephrosis   htn      4/10   stable   still has pain     4/11   no change     pain better     Past Medical History:   has a past medical history of Cancer [address] [address] Hypertension, and Thyroid disease. Past Surgical History:   has a past surgical history that includes Hysterectomy and joint replacement. Home Medications:    Prior to Admission medications    Not on File       Allergies:  Clindamycin/lincomycin; Demerol hcl [meperidine]; Flagyl [metronidazole]; and Pcn [penicillins]    Social History:   reports that she has never smoked. She has never used smokeless tobacco. She reports that she drank alcohol. She reports that she has current or past drug history. Drugs: Cocaine and Marijuana. Family History: family history includes Alcohol Abuse in her father; Cancer in her brother; Coronary Art Dis in her father; Hypertension in her brother. REVIEW OF SYSTEMS:    · Constitutional: Negative for weight loss  · Eyes: Negative for visual changes, diplopia, scleral icterus. · ENT: Negative for Headaches, hearing loss, vertigo, mouth sores, sore throat. · Cardiovascular: Negative for lightheadedness/orthostatic symptoms ,chest pain, dyspnea on exertion, palpitations or loss of consciousness. · Respiratory: Negative for cough or wheezing, sputum production, hemoptysis, pleuritic pain.   · Gastrointestinal: Negative for nausea/vomiting, change in bowel habits, abdominal pain, dysphagia/appetite loss, hematemesis, blood in stools. · Genitourinary:Negative for change in bladder habits, dysuria, trouble voiding, hematuria. Lt cva / pain   · Musculoskeletal: Negative for gait disturbance, weakness, joint complaints. · Integumentary: Negative for rash, pruritis. · Neurological: Negative for headache, dizziness, change in muscle strength, numbness/tingling, change in gait, balance, coordination,   · Endocrine: negative for temperature intolerance, excessive thirst, fluid intake, or urination, tremor. · Hematologic/Lymphatic: negative for abnormal bruising or bleeding, blood clots, swollen lymph nodes. · Allergic/Immunologic: negative for nasal congestion, pruritis, hives. PHYSICAL EXAM:    BP (!) 145/60   Pulse 70   Temp 98.5 °F (36.9 °C) (Oral)   Resp 14   Ht 5' 2\" (1.575 m)   Wt 180 lb (81.6 kg)   LMP  (Exact Date)   SpO2 98%   Breastfeeding? No   BMI 32.92 kg/m²      · General appearance: well nourished  · HEENT: Head: Normocephalic, no lesions, without obvious abnormality. · Lungs: clear to auscultation bilaterally  · Heart: regular rate and rhythm, S1, S2 normal, no murmur, click, rub or gallop  · Abdomen: soft, non-tender; bowel sounds normal; no masses,  no organomegalytender lt cva   ·   · Extremities: extremities normal, atraumatic, no cyanosis or edema  · Neurological: Gait normal. Reflexes normal and symmetric. Sensation grossly normal  · Skin - no rash, no lump   · Eye no icterus no redness  · Lymphatic system-no lymphadenopathy no splenomegaly       DIAGNOSTICS:    Laboratory Testing:  CBC: No results for input(s): WBC, HGB, PLT in the last 72 hours. BMP:  No results for input(s): NA, K, CL, CO2, BUN, CREATININE, GLUCOSE in the last 72 hours. S. Calcium:No results for input(s): CALCIUM in the last 72 hours. S. Ionized Calcium:No results for input(s): IONCA in the last 72 hours. S. Magnesium:No results for input(s): MG in the last 72 hours.   S. Phosphorus:No results for input(s): PHOS in the last 72 hours.  S. Glucose:No results for input(s): POCGLU in the last 72 hours. Glycosylated hemoglobin A1C: No results for input(s): LABA1C in the last 72 hours. INR: No results for input(s): INR in the last 72 hours. Hepatic functions: No results for input(s): ALKPHOS, ALT, AST, PROT, BILITOT, BILIDIR, LABALBU in the last 72 hours. Pancreatic functions:No results for input(s): LACTA, AMYLASE in the last 72 hours. S. Lactic Acid: No results for input(s): LACTA in the last 72 hours. Cardiac enzymes:No results for input(s): CKTOTAL, CKMB, CKMBINDEX, TROPONINI in the last 72 hours. BNP:No results for input(s): BNP in the last 72 hours. Lipid profile: No results for input(s): CHOL, TRIG, HDL, LDLCALC in the last 72 hours. Invalid input(s): LDL  Blood Gases: No results found for: PH, PCO2, PO2, HCO3, O2SAT  Thyroid functions:   Lab Results   Component Value Date    TSH 2.57 04/04/2019        Imaging/Diagonstics:    Ct Abdomen Pelvis Wo Contrast    Result Date: 4/4/2019  EXAMINATION: CT OF THE ABDOMEN AND PELVIS WITHOUT CONTRAST 4/2/2019 2:16 pm TECHNIQUE: CT of the abdomen and pelvis was performed without the administration of intravenous contrast. Multiplanar reformatted images are provided for review. Dose modulation, iterative reconstruction, and/or weight based adjustment of the mA/kV was utilized to reduce the radiation dose to as low as reasonably achievable. COMPARISON: None HISTORY: ORDERING SYSTEM PROVIDED HISTORY: flank pain TECHNOLOGIST PROVIDED HISTORY: Ordering Physician Provided Reason for Exam: lt side flank ellie x 10 days Acuity: Unknown Type of Exam: Unknown Relevant Medical/Surgical History: mult abd surgeries, hx colon ca FINDINGS: Lower Chest: Lung bases are clear. Heart is not enlarged. No pericardial effusion. Organs: No suspicious hepatic lesions. Lobulated fluid density lesion in hepatic segment 6 is most likely a cyst and requires no additional imaging follow-up. Cholecystectomy clips. Visualized lung is unremarkable. No acute abnormality. Vl Dup Lower Extremity Venous Left    Result Date: 4/2/2019    [facility]  Vascular Lower Extremities DVT Study Procedure   Patient Name   Justin Banks  Date of Study           04/02/2019                 R   Date of Birth  1960  Gender                  Female   Age            59 year(s)  Race                       Room Number    0222   Corporate ID # [id number]   Patient Acct # [de-identified]   MR #           [id number]      Sonographer             Leonardo Madsen, RVT   Accession #    [id number]   Interpreting Physician  Carmel Hutchison   Referring                  Referring Physician     Jerad Pascual, CNP  Nurse  Practitioner  Procedure Type of Study:   Veins: Lower Extremities DVT Study, Venous Scan Lower Left. Indications for Study:Swelling. Patient Status:ER. Technical Quality:Adequate visualization. Conclusions   Summary   No evidence of superficial or deep venous thrombosis in the left lower  extremity and right common femoral vein. Signature   ----------------------------------------------------------------  Electronically signed by Leonardo Madsen RVT(Sonographer) on  04/02/2019 05:19 PM  ----------------------------------------------------------------   ----------------------------------------------------------------  Electronically signed by Gonzalo Dick(Interpreting  physician) on 04/02/2019 08:45 PM  ----------------------------------------------------------------  Findings:   Right Impression:                Left Impression:  The common femoral vein          The common femoral, femoral, popliteal  demonstrates normal              and tibial veins demonstrate normal  compressibility and              compressibility and augmentation. augmentation. Normal compressibility of the great                                   saphenous vein.                                     Normal compressibility of the small                                   saphenous vein. Velocities are measured in cm/s ; Diameters are measured in cm Right Lower Extremities DVT Study Measurements Right 2D Measurements +------------------------------------+----------+---------------+----------+ ! Location                            ! Visualized! Compressibility! Thrombosis! +------------------------------------+----------+---------------+----------+ ! Common Femoral                      !Yes       ! Yes            ! None      ! +------------------------------------+----------+---------------+----------+ Left Lower Extremities DVT Study Measurements Left 2D Measurements +------------------------------------+----------+---------------+----------+ ! Location                            ! Visualized! Compressibility! Thrombosis! +------------------------------------+----------+---------------+----------+ ! Common Femoral                      !Yes       ! Yes            ! None      ! +------------------------------------+----------+---------------+----------+ ! Prox Femoral                        !Yes       ! Yes            ! None      ! +------------------------------------+----------+---------------+----------+ ! Mid Femoral                         !Yes       ! Yes            ! None      ! +------------------------------------+----------+---------------+----------+ ! Dist Femoral                        !Yes       ! Yes            ! None      ! +------------------------------------+----------+---------------+----------+ ! Deep Femoral                        !Yes       ! Yes            ! None      ! +------------------------------------+----------+---------------+----------+ ! Popliteal                           !Yes       ! Yes            ! None      ! +------------------------------------+----------+---------------+----------+ ! Sapheno Femoral Junction            ! Yes       ! Yes            ! None      ! +------------------------------------+----------+---------------+----------+ ! PTV                                 ! Yes       ! Yes            ! None      ! +------------------------------------+----------+---------------+----------+ ! Peroneal                            !Yes       ! Yes            ! None      ! +------------------------------------+----------+---------------+----------+ ! Gastroc                             ! Yes       ! Yes            ! None      ! +------------------------------------+----------+---------------+----------+ ! GSV Thigh                           ! Yes       ! Yes            ! None      ! +------------------------------------+----------+---------------+----------+ ! GSV Knee                            ! Yes       ! Yes            ! None      ! +------------------------------------+----------+---------------+----------+ ! GSV Ankle                           ! Yes       ! Yes            ! None      ! +------------------------------------+----------+---------------+----------+ ! SSV                                 ! Yes       ! Yes            ! None      ! +------------------------------------+----------+---------------+----------+        ASSESSMENT:    Patient Active Problem List   Diagnosis    Severe major depressive disorder ([facility] [address]    Major depression, chronic    Hydronephrosis      lt hydronephrosis   Small calculi   no hematuria   no uri   needs urology eval     htn controlled    k nl      Cp  ? Related to back pain   PLAN:     cont same meds     bp controlled    no meds     urology to see     4/10   pain persists   no fever    Cr nl    No hematuria   awaiting urology to see     4/11    no change   awaiting urology to see    bp controlled  Cr nl     4/12   abd pain better   awaiting urology to see  bp controlled       MD CLAIRE Koch [address], [address] [place] [address].    Phone [phone]   Fax: [phone]  Answering Service: [phone]

## 2019-04-13 NOTE — PLAN OF CARE
Problem: Falls - Risk of: Intervention: Assess potential safety hazards  Note:   Pt remains free of falls and verbalizes understanding of individual fall risks. Pt wearing non skid footwear and encouraged to seek out staff for any assistance needed. Problem: Altered Mood, Depressive Behavior:  Goal: Ability to disclose and discuss suicidal ideas will improve  Description  Ability to disclose and discuss suicidal ideas will improve  Outcome: Ongoing  Note:   Pt states they are not having thoughts of self harm at this time and verbally agrees to seek staff if feelings of harming self arise. Problem: Pain:  Goal: Control of acute pain  Description  Control of acute pain  Outcome: Ongoing  Note:   Patient complains of acute pain in the form of a headache today. She was given PRN Motrin and then PRN Tylenol for relief. Patient believes the headache is caused from caffeine withdrawals.       Problem: Altered Mood, Depressive Behavior:  Goal: Able to verbalize and/or display a decrease in depressive symptoms  Description  Able to verbalize and/or display a decrease in depressive symptoms  Outcome: Ongoing

## 2019-04-13 NOTE — GROUP NOTE
Group Therapy Note    Date: April 13    Group Start Time: 1330  Group End Time: 1430  Group Topic: Psychoeducation    BOSTON Poe    Pt did not attend music and leisure skills group despite staff invitation to attend.

## 2019-04-13 NOTE — PLAN OF CARE
Problem: Altered Mood, Depressive Behavior:  Goal: Able to verbalize and/or display a decrease in depressive symptoms  Description  Able to verbalize and/or display a decrease in depressive symptoms  4/13/2019 1712 by John Carrasco LPN  Outcome: Ongoing  Note:   Patient states that she is concerned related to MRI. States that Seroquel was working but it was stopped for MRI and now she is having increased anxiety and depressed mood as well as poor sleep last night. Support and reassurance given. Compliant with meds and behavior is controlled.    4/13/2019 1639 by John Carrasco LPN  Outcome: Ongoing

## 2019-04-13 NOTE — PLAN OF CARE
Problem: Altered Mood, Depressive Behavior:  Goal: Able to verbalize and/or display a decrease in depressive symptoms  Description  Able to verbalize and/or display a decrease in depressive symptoms  4/12/2019 2056 by Anabel Dennison RN  Outcome: Ongoing  Note:   Patient denies depressioin     Problem: Altered Mood, Depressive Behavior:  Goal: Ability to disclose and discuss suicidal ideas will improve  Description  Ability to disclose and discuss suicidal ideas will improve  4/12/2019 2056 by Anabel Dennison RN  Outcome: Ongoing  Note:   Denies suicidal ideations

## 2019-04-13 NOTE — GROUP NOTE
Group Therapy Note    Date: April 13    Group Start Time: 1100  Group End Time: 1130  Group Topic: Healthy Living/Wellness    [facility]    Christie Torres RN        Group Therapy Note    Attendees: 6         Patient's Goal: participate in group     Notes:  Did not attend group      Signature:  Christie Torres RN

## 2019-04-13 NOTE — BH NOTE
Department of Psychiatry  Attending Progress Note  Chief Complaint: Severe major depressive disorder ([address]     SUBJECTIVE:    Met with pt 1:1 in room with door open. Patient is feeling depressed and overwhelmed from time to time. Having perseverating thoughts about becoming forgetful and getting dementia just like her mother. Fears that forgetfulness could also be secondary effect from chemotherapy. Can easily become despondent to life and believe that it is not worth living. OBJECTIVE    Physical  /69   Pulse 69   Temp 98.2 °F (36.8 °C) (Oral)   Resp 14   Ht 5' 2\" (1.575 m)   Wt 180 lb (81.6 kg)   LMP  (Exact Date)   SpO2 98%   Breastfeeding?  No   BMI 32.92 kg/m²      Mental Status Evaluation:  Orientation: alertness: alert   Mood:. anxious and constricted      Affect:  Constricted, flat      Appearance:  age appropriate   Activity:  Sedentary    Gait/Posture: Normal   Speech:  delayed, increased latency of response, normal pitch and normal volume   Thought Process:  circumstantial   Thought Content:  delusions and hallucinations   Sensorium:  person and place   Cognition:  grossly intact   Memory: intact   Insight:  Limited, poor   Judgment: Limited, poor   Suicidal Ideations: passive   Homicidal Ideations: Negative for homicidal ideation      Medication Side Effects: absent       Attention Span attention span appeared shorter than expected for age     Medications  Current Facility-Administered Medications   Medication Dose Route Frequency Provider Last Rate Last Dose    sertraline (ZOLOFT) tablet 150 mg  150 mg Oral Daily Livan Imam Manan HEAD MD   150 mg at 04/13/19 0834    buPROPion (WELLBUTRIN) tablet 100 mg  100 mg Oral Daily Rudy HEAD MD   100 mg at 04/13/19 0834    busPIRone (BUSPAR) tablet 5 mg  5 mg Oral TID Army Yasmin HEAD MD   5 mg at 04/13/19 0834    diphenhydrAMINE (BENADRYL) tablet 50 mg  50 mg Oral Nightly PRN Yung Quintero MD   50 mg at 04/12/19 2042    melatonin ER tablet 1 mg  1 mg Oral Nightly PRN William HEAD MD   1 mg at 04/11/19 2048    dicyclomine (BENTYL) tablet 20 mg  20 mg Oral 4x Daily PRN Hue Gaffney MD   20 mg at 04/06/19 1850    ondansetron (ZOFRAN) tablet 4 mg  4 mg Oral Q8H PRN Brenna Barrett APRN - CNP   4 mg at 04/06/19 0400    acetaminophen (TYLENOL) tablet 650 mg  650 mg Oral Q4H PRN Brenna Barrett, APRN - CNP   650 mg at 04/05/19 1539    hydrOXYzine (ATARAX) tablet 25 mg  25 mg Oral TID PRN Brenna Barrett, APRN - CNP   25 mg at 04/13/19 0834    benztropine mesylate (COGENTIN) injection 2 mg  2 mg Intramuscular BID PRN Brenna Barrett APRN - CNP        magnesium hydroxide (MILK OF MAGNESIA) 400 MG/5ML suspension 30 mL  30 mL Oral Daily PRN Brenna Barrett APRN - CNP        aluminum & magnesium hydroxide-simethicone (MAALOX) 200-200-20 MG/5ML suspension 30 mL  30 mL Oral Q6H PRN Brenna Barrett, APRN - CNP        nicotine polacrilex (NICORETTE) gum 2 mg  2 mg Oral Q2H PRN Brenna Barrett APRN - CNP        haloperidol lactate (HALDOL) injection 5 mg  5 mg Intramuscular Q4H PRN Brenna Barrett APRN - CNP        ibuprofen (ADVIL;MOTRIN) tablet 800 mg  800 mg Oral TID PRN Brenna Barrett APRN - CNP   800 mg at 04/13/19 1139    tamsulosin (FLOMAX) capsule 0.4 mg  0.4 mg Oral Daily Brenna ADILIA BarrettN - CNP   0.4 mg at 04/13/19 0834    miconazole (MICOTIN) 2 % cream   Topical BID TERRI Lyles CNP             sertraline  150 mg Oral Daily    buPROPion  100 mg Oral Daily    busPIRone  5 mg Oral TID    tamsulosin  0.4 mg Oral Daily    miconazole   Topical BID       ASSESSMENT  Severe major depressive disorder ([facility] [address]     Patient's Response to Treatment: positive    PLAN    1. Continue medications   2. Encourage social activity to evaluate. Dragon voice recognition software used in portions of this document. Funmi Becerra

## 2019-04-14 PROCEDURE — 6360000002 HC RX W HCPCS: Performed by: NURSE PRACTITIONER

## 2019-04-14 PROCEDURE — 1240000000 HC EMOTIONAL WELLNESS R&B

## 2019-04-14 PROCEDURE — 6370000000 HC RX 637 (ALT 250 FOR IP): Performed by: PSYCHIATRY & NEUROLOGY

## 2019-04-14 PROCEDURE — 6370000000 HC RX 637 (ALT 250 FOR IP): Performed by: NURSE PRACTITIONER

## 2019-04-14 PROCEDURE — 99231 SBSQ HOSP IP/OBS SF/LOW 25: CPT | Performed by: NURSE PRACTITIONER

## 2019-04-14 RX ADMIN — BUSPIRONE HYDROCHLORIDE 5 MG: 5 TABLET ORAL at 15:44

## 2019-04-14 RX ADMIN — HALOPERIDOL LACTATE 5 MG: 5 INJECTION, SOLUTION INTRAMUSCULAR at 12:37

## 2019-04-14 RX ADMIN — SERTRALINE HYDROCHLORIDE 150 MG: 100 TABLET ORAL at 08:03

## 2019-04-14 RX ADMIN — TAMSULOSIN HYDROCHLORIDE 0.4 MG: 0.4 CAPSULE ORAL at 08:03

## 2019-04-14 RX ADMIN — BUPROPION HYDROCHLORIDE 100 MG: 100 TABLET, FILM COATED ORAL at 08:02

## 2019-04-14 RX ADMIN — BUSPIRONE HYDROCHLORIDE 5 MG: 5 TABLET ORAL at 20:57

## 2019-04-14 RX ADMIN — BUSPIRONE HYDROCHLORIDE 5 MG: 5 TABLET ORAL at 08:02

## 2019-04-14 RX ADMIN — QUETIAPINE FUMARATE 100 MG: 100 TABLET ORAL at 20:57

## 2019-04-14 RX ADMIN — HYDROXYZINE HYDROCHLORIDE 25 MG: 25 TABLET, FILM COATED ORAL at 20:57

## 2019-04-14 NOTE — GROUP NOTE
Group Therapy Note    Date: April 14    Group Start Time: 1100  Group End Time: 2065  Group Topic: Psychoeducation    [facility]    Michele Cuevas        Group Therapy Note    Attendees:         Patient's Goal:  Work on taking responsibility for actions and bringing positive aspects into our lives. Status After Intervention:  Improved    Participation Level:  Active Listener and Interactive    Participation Quality: Appropriate      Speech:  normal      Thought Process/Content: Logical      Affective Functioning: Congruent      Mood: appropraite      Level of consciousness:  Alert and Oriented x4      Response to Learning: Able to verbalize current knowledge/experience      Endings: None Reported    Modes of Intervention: Education, Exploration and Problem-solving      Discipline Responsible: Behavorial Health Tech      Signature:  Michele Cuevas

## 2019-04-14 NOTE — BH NOTE
sertraline (ZOLOFT) tablet 150 mg  150 mg Oral Daily Will HEAD MD   150 mg at 04/14/19 0803    buPROPion [facility]) tablet 100 mg  100 mg Oral Daily Rudy HEAD MD   100 mg at 04/14/19 0802    busPIRone (BUSPAR) tablet 5 mg  5 mg Oral TID Josie Hawk MD   5 mg at 04/14/19 0802    diphenhydrAMINE (BENADRYL) tablet 50 mg  50 mg Oral Nightly PRN Josie Hawk MD   50 mg at 04/12/19 2042    melatonin ER tablet 1 mg  1 mg Oral Nightly PRN Josie Hawk MD   1 mg at 04/13/19 2128    dicyclomine (BENTYL) tablet 20 mg  20 mg Oral 4x Daily PRN Evette Bolton MD   20 mg at 04/06/19 1850    ondansetron (ZOFRAN) tablet 4 mg  4 mg Oral Q8H PRN TERRI Alexandra CNP   4 mg at 04/06/19 0400    acetaminophen (TYLENOL) tablet 650 mg  650 mg Oral Q4H PRN TERRI Alexandra - CNP   650 mg at 04/13/19 1908    hydrOXYzine (ATARAX) tablet 25 mg  25 mg Oral TID PRN TERRI Alexandra - CNP   25 mg at 04/13/19 2129    benztropine mesylate (COGENTIN) injection 2 mg  2 mg Intramuscular BID PRN TERRI Alexandra CNP        magnesium hydroxide (MILK OF MAGNESIA) 400 MG/5ML suspension 30 mL  30 mL Oral Daily PRN TERRI Alexandra CNP        aluminum & magnesium hydroxide-simethicone (MAALOX) 200-200-20 MG/5ML suspension 30 mL  30 mL Oral Q6H PRN TERRI Alexandra CNP        nicotine polacrilex (NICORETTE) gum 2 mg  2 mg Oral Q2H PRN TERRI Alexandra CNP        haloperidol lactate (HALDOL) injection 5 mg  5 mg Intramuscular Q4H PRN TERRI Alexandra CNP        ibuprofen (ADVIL;MOTRIN) tablet 800 mg  800 mg Oral TID PRN TERRI Alexandra CNP   800 mg at 04/13/19 1139    tamsulosin (FLOMAX) capsule 0.4 mg  0.4 mg Oral Daily TERRI Alexandra CNP   0.4 mg at 04/14/19 0803    miconazole (MICOTIN) 2 % cream   Topical BID TERRI Arshad CNP             QUEtiapine  100 mg Oral Nightly    sertraline  150 mg Oral Daily    buPROPion  100 mg Oral Daily    busPIRone  5 mg Oral TID    tamsulosin  0.4 mg Oral Daily    miconazole   Topical BID       ASSESSMENT  Severe major depressive disorder (HCC)     Patient's Response to Treatment: positive    PLAN    1. Continue medications   2. Encourage social activity to evaluate. Dragon voice recognition software used in portions of this document. Funmi Becerra

## 2019-04-14 NOTE — GROUP NOTE
Group Therapy Note    Date: April 14    Group Start Time: 1700  Group End Time: 2296  Group Topic: Healthy Living/Wellness    [facility]    Rich Barrett        Group Therapy Note         Patient's Goal: participate in safety checks and group discussion. Notes:  No contraband or room issues found    Status After Intervention:  Unchanged    Participation Level:  Active Listener    Participation Quality: Appropriate      Speech:  normal      Thought Process/Content: Logical      Affective Functioning: Congruent      Mood: appropriate    Level of consciousness:  Alert and Oriented x4      Response to Learning: Able to verbalize current knowledge/experience       Endings: None Reported    Modes of Intervention: Education, Safety      Discipline Responsible: [address], [employer]      Signature:  Rich Barrett

## 2019-04-14 NOTE — GROUP NOTE
Group Therapy Note    Date: April 14    Group Start Time: 1000  Group End Time: 8153  Group Topic: Psychoeducation    [facility]    MALENA Machado        Group Therapy Note    Attendees: 9/20         Patient's Goal:  Able to verbalize and/or display a decrease in depressive symptoms. Notes:  Pt actively participated and engaged in group discussion. Pt provided supportive feedback to peers. Status After Intervention:  Improved    Participation Level:  Active Listener and Interactive    Participation Quality: Appropriate, Attentive, Sharing and Supportive      Speech:  normal      Thought Process/Content: Logical      Affective Functioning: Congruent      Mood: euthymic      Level of consciousness:  Alert, Oriented x4 and Attentive      Response to Learning: Able to verbalize current knowledge/experience, Able to verbalize/acknowledge new learning, Capable of insight and Progressing to goal      Endings: None Reported    Modes of Intervention: Education, Support, Socialization, Problem-solving and Activity      Discipline Responsible: /Counselor      Signature:  MALENA Machado

## 2019-04-14 NOTE — GROUP NOTE
Group Therapy Note    Date: April 14    Group Start Time: 0845  Group End Time: 0900  Group Topic: Community Meeting    BOSTON Kumari    Pt did not attend community meeting and goal setting skills group despite staff invitation to attend.

## 2019-04-14 NOTE — BH NOTE
PRN note  Pt at desk c/o anxiety and racing thoughts, pt displays increase in gross motor activity, pacing, shifting from foot to foot, relates peer's discussion triggered her anxiety.  PRN haldol 5 mg administered, positive results

## 2019-04-15 PROCEDURE — 6370000000 HC RX 637 (ALT 250 FOR IP): Performed by: PSYCHIATRY & NEUROLOGY

## 2019-04-15 PROCEDURE — 99232 SBSQ HOSP IP/OBS MODERATE 35: CPT | Performed by: REGISTERED NURSE

## 2019-04-15 PROCEDURE — 1240000000 HC EMOTIONAL WELLNESS R&B

## 2019-04-15 PROCEDURE — 6360000002 HC RX W HCPCS: Performed by: NURSE PRACTITIONER

## 2019-04-15 PROCEDURE — 6370000000 HC RX 637 (ALT 250 FOR IP): Performed by: NURSE PRACTITIONER

## 2019-04-15 RX ORDER — HYDROXYZINE HYDROCHLORIDE 25 MG/1
25 TABLET, FILM COATED ORAL 3 TIMES DAILY PRN
Qty: 30 TABLET | Refills: 0 | Status: SHIPPED | OUTPATIENT
Start: 2019-04-15 | End: 2019-04-25

## 2019-04-15 RX ORDER — BUSPIRONE HYDROCHLORIDE 5 MG/1
5 TABLET ORAL 3 TIMES DAILY
Qty: 42 TABLET | Refills: 0 | Status: ON HOLD | OUTPATIENT
Start: 2019-04-15 | End: 2019-06-05 | Stop reason: HOSPADM

## 2019-04-15 RX ORDER — QUETIAPINE FUMARATE 100 MG/1
100 TABLET, FILM COATED ORAL NIGHTLY
Qty: 14 TABLET | Refills: 0 | Status: ON HOLD | OUTPATIENT
Start: 2019-04-15 | End: 2019-06-05 | Stop reason: HOSPADM

## 2019-04-15 RX ORDER — BUPROPION HYDROCHLORIDE 100 MG/1
100 TABLET ORAL DAILY
Qty: 14 TABLET | Refills: 0 | Status: ON HOLD | OUTPATIENT
Start: 2019-04-16 | End: 2019-06-05 | Stop reason: HOSPADM

## 2019-04-15 RX ADMIN — QUETIAPINE FUMARATE 100 MG: 100 TABLET ORAL at 20:59

## 2019-04-15 RX ADMIN — HYDROXYZINE HYDROCHLORIDE 25 MG: 25 TABLET, FILM COATED ORAL at 20:59

## 2019-04-15 RX ADMIN — BUSPIRONE HYDROCHLORIDE 5 MG: 5 TABLET ORAL at 20:59

## 2019-04-15 RX ADMIN — HYDROXYZINE HYDROCHLORIDE 25 MG: 25 TABLET, FILM COATED ORAL at 12:15

## 2019-04-15 RX ADMIN — SERTRALINE HYDROCHLORIDE 150 MG: 100 TABLET ORAL at 08:32

## 2019-04-15 RX ADMIN — BUPROPION HYDROCHLORIDE 100 MG: 100 TABLET, FILM COATED ORAL at 08:32

## 2019-04-15 RX ADMIN — BUSPIRONE HYDROCHLORIDE 5 MG: 5 TABLET ORAL at 08:32

## 2019-04-15 RX ADMIN — TAMSULOSIN HYDROCHLORIDE 0.4 MG: 0.4 CAPSULE ORAL at 08:32

## 2019-04-15 RX ADMIN — HALOPERIDOL LACTATE 5 MG: 5 INJECTION, SOLUTION INTRAMUSCULAR at 15:30

## 2019-04-15 RX ADMIN — BUSPIRONE HYDROCHLORIDE 5 MG: 5 TABLET ORAL at 15:15

## 2019-04-15 NOTE — GROUP NOTE
Group Therapy Note    Date: April 15    Group Start Time: 0845  Group End Time: 0900  Group Topic: Adelaida Desir, CTRS        Group Therapy Note  Pt did not attend Community Meeting at [address] [place] [address] d/t resting in room despite staff invitation to attend.

## 2019-04-15 NOTE — PROGRESS NOTES
CLINICAL PHARMACY NOTE: MEDS TO [address] Select Patient?: No  Total # of Prescriptions Filled: 5   The following medications were delivered to the patient:  · SERTRALINE  · BUSPIRONE  · BUPROPION  · QUETIAPINE FUMARATE  · HYDROXYZINE  ·   Total # of Interventions Completed: 0  Time Spent (min): 30    Additional Documentation:

## 2019-04-15 NOTE — GROUP NOTE
Group Therapy Note    Date: April 15    Group Start Time: 1100AM  Group End Time: 1145AM  Group Topic: Cognitive Skills    BOSTON Gordon        Group Therapy Note    Attendees: 10/20         Patient's Goal:  To increase social interaction and engage in problem solving and communication task with peers    Notes:  Pt was able to both communicate and decipher clues in problem solving task and was social and supportive with peers    Status After Intervention:  Improved    Participation Level:  Active Listener and Interactive    Participation Quality: Appropriate, Attentive, Sharing and Supportive      Speech:  normal      Thought Process/Content: Logical      Affective Functioning: Congruent      Mood: euthymic      Level of consciousness:  Alert, Oriented x4 and Attentive      Response to Learning: Able to verbalize current knowledge/experience, Able to verbalize/acknowledge new learning, Able to retain information and Progressing to goal      Endings: None Reported    Modes of Intervention: Education, Support, Socialization, Exploration, Problem-solving and Activity      Discipline Responsible: Psychoeducational Specialist      Signature:  Mary Sorensen

## 2019-04-15 NOTE — PLAN OF CARE
Problem: Altered Mood, Depressive Behavior:  Goal: Able to verbalize and/or display a decrease in depressive symptoms  Description  Able to verbalize and/or display a decrease in depressive symptoms  Outcome: Ongoing  Goal: Ability to disclose and discuss suicidal ideas will improve  Description  Ability to disclose and discuss suicidal ideas will improve  Outcome: Ongoing     Problem: Pain:  Goal: Pain level will decrease  Description  Pain level will decrease  Outcome: Ongoing  Goal: Control of acute pain  Description  Control of acute pain  Outcome: Ongoing  Goal: Control of chronic pain  Description  Control of chronic pain  Outcome: Ongoing   Patient denies  depression and any thoughts of self harm. Denies hallucinations. Reports chronic pain in left shoulder of 8/10. Medication compliant and behavior controlled. Attending groups.

## 2019-04-15 NOTE — PLAN OF CARE
Problem: Altered Mood, Depressive Behavior:  Goal: Able to verbalize and/or display a decrease in depressive symptoms  Description  Able to verbalize and/or display a decrease in depressive symptoms  4/14/2019 2011 by Michelle Mehta RN  Outcome: Ongoing  Note:   Denies depression     Problem: Altered Mood, Depressive Behavior:  Goal: Ability to disclose and discuss suicidal ideas will improve  Description  Ability to disclose and discuss suicidal ideas will improve  4/14/2019 2011 by Michelle Mehta RN  Outcome: Ongoing  Note:   Denies suicidal ideations Detail Level: Zone Initiate Treatment: [place] 0.1% QHS

## 2019-04-15 NOTE — BH NOTE
Department of Psychiatry  Attending Progress Note  Chief Complaint: Severe major depressive disorder ([address]     SUBJECTIVE:    Beverly seen in her room. She states she doesn't feel like she would like to go to the spirituality group. She states she is feeling much better today. She states she feels she could be able to go home tomorrow. She attends most groups. She has some anxiety but states she can tolerate. She denies SI HI or hallucinations. Discharge tomorrow. OBJECTIVE    Physical  /85   Pulse 76   Temp 97.5 °F (36.4 °C) (Oral)   Resp 12   Ht 5' 2\" (1.575 m)   Wt 180 lb (81.6 kg)   LMP  (Exact Date)   SpO2 98%   Breastfeeding?  No   BMI 32.92 kg/m²      Mental Status Evaluation:  Orientation: alertness: alert   Mood:. anxious and constricted      Affect:  Constricted, flat      Appearance:  age appropriate   Activity:  Sedentary needs a lot of encouragement   Gait/Posture: Normal   Speech:  delayed, increased latency of response, normal pitch and normal volume   Thought Process:  circumstantial   Thought Content:  Delusions, perseveration but better   Sensorium:  person and place   Cognition:  grossly intact   Memory: intact   Insight:  Limited   Judgment: Limited   Suicidal Ideations: negative   Homicidal Ideations: Negative for homicidal ideation      Medication Side Effects: absent       Attention Span attention span appeared shorter than expected for age     Medications  Current Facility-Administered Medications   Medication Dose Route Frequency Provider Last Rate Last Dose    sodium chloride flush 0.9 % injection 10 mL  10 mL Intravenous PRN Luba HEAD MD   10 mL at 04/13/19 1454    QUEtiapine (SEROQUEL) tablet 100 mg  100 mg Oral Nightly Rudy HEAD MD   100 mg at 04/14/19 2057    sertraline (ZOLOFT) tablet 150 mg  150 mg Oral Daily Warren HEAD MD   150 mg at 04/15/19 0832    buPROPion (WELLBUTRIN) tablet 100 mg  100 mg Oral Daily Rduy Hinkle MD ADILENE   100 mg at 04/15/19 0832    busPIRone (BUSPAR) tablet 5 mg  5 mg Oral TID Candance Cypher, MD   5 mg at 04/15/19 1515    diphenhydrAMINE (BENADRYL) tablet 50 mg  50 mg Oral Nightly PRN Candance Cypher, MD   50 mg at 04/12/19 2042    melatonin ER tablet 1 mg  1 mg Oral Nightly PRN Candance Cypher, MD   1 mg at 04/13/19 2128    dicyclomine (BENTYL) tablet 20 mg  20 mg Oral 4x Daily PRN Ramiro Rodas MD   20 mg at 04/06/19 1850    ondansetron (ZOFRAN) tablet 4 mg  4 mg Oral Q8H PRN Genesee Gazella, APRN - CNP   4 mg at 04/06/19 0400    acetaminophen (TYLENOL) tablet 650 mg  650 mg Oral Q4H PRN Genesee Gazella, APRN - CNP   650 mg at 04/13/19 1908    hydrOXYzine (ATARAX) tablet 25 mg  25 mg Oral TID PRN Genesee Gazella, APRN - CNP   25 mg at 04/15/19 1215    benztropine mesylate (COGENTIN) injection 2 mg  2 mg Intramuscular BID PRN Genesee Gazella, APRN - CNP        magnesium hydroxide (MILK OF MAGNESIA) 400 MG/5ML suspension 30 mL  30 mL Oral Daily PRN Genesee Gazella, APRN - CNP        aluminum & magnesium hydroxide-simethicone (MAALOX) 200-200-20 MG/5ML suspension 30 mL  30 mL Oral Q6H PRN Genesee Gazella, APRN - CNP        nicotine polacrilex (NICORETTE) gum 2 mg  2 mg Oral Q2H PRN Genesee Gazella, APRN - CNP        haloperidol lactate (HALDOL) injection 5 mg  5 mg Intramuscular Q4H PRN Genesee Gazella, APRN - CNP   5 mg at 04/14/19 1237    ibuprofen (ADVIL;MOTRIN) tablet 800 mg  800 mg Oral TID PRN Genesee Gazella, APRN - CNP   800 mg at 04/13/19 1139    tamsulosin (FLOMAX) capsule 0.4 mg  0.4 mg Oral Daily Genesee Gazella, APRN - CNP   0.4 mg at 04/15/19 9768    miconazole (MICOTIN) 2 % cream   Topical BID Hodan Paz APRN - CNP             QUEtiapine  100 mg Oral Nightly    sertraline  150 mg Oral Daily    buPROPion  100 mg Oral Daily    busPIRone  5 mg Oral TID    tamsulosin  0.4 mg Oral Daily    miconazole   Topical BID       ASSESSMENT  Severe major depressive disorder ([address]     Patient's Response to Treatment: positive    PLAN    1. Continue medications   2. Encourage social activity to evaluate. 3. Discharge home tomorrow if she continues to do well. Dragon voice recognition software used in portions of this document. Anoop Arora

## 2019-04-16 VITALS
SYSTOLIC BLOOD PRESSURE: 123 MMHG | OXYGEN SATURATION: 98 % | RESPIRATION RATE: 14 BRPM | WEIGHT: 180 LBS | TEMPERATURE: 98.2 F | DIASTOLIC BLOOD PRESSURE: 76 MMHG | BODY MASS INDEX: 33.13 KG/M2 | HEIGHT: 62 IN | HEART RATE: 68 BPM

## 2019-04-16 PROCEDURE — 6370000000 HC RX 637 (ALT 250 FOR IP): Performed by: PSYCHIATRY & NEUROLOGY

## 2019-04-16 PROCEDURE — 6370000000 HC RX 637 (ALT 250 FOR IP): Performed by: NURSE PRACTITIONER

## 2019-04-16 PROCEDURE — 99239 HOSP IP/OBS DSCHRG MGMT >30: CPT | Performed by: REGISTERED NURSE

## 2019-04-16 PROCEDURE — 5130000000 HC BRIDGE APPOINTMENT

## 2019-04-16 RX ORDER — CALCIUM CARBONATE 200(500)MG
500 TABLET,CHEWABLE ORAL 3 TIMES DAILY PRN
Status: DISCONTINUED | OUTPATIENT
Start: 2019-04-16 | End: 2019-04-16 | Stop reason: HOSPADM

## 2019-04-16 RX ADMIN — SERTRALINE HYDROCHLORIDE 150 MG: 100 TABLET ORAL at 09:03

## 2019-04-16 RX ADMIN — ALUMINUM HYDROXIDE, MAGNESIUM HYDROXIDE, AND SIMETHICONE 30 ML: 200; 200; 20 SUSPENSION ORAL at 09:55

## 2019-04-16 RX ADMIN — BUSPIRONE HYDROCHLORIDE 5 MG: 5 TABLET ORAL at 09:03

## 2019-04-16 RX ADMIN — TAMSULOSIN HYDROCHLORIDE 0.4 MG: 0.4 CAPSULE ORAL at 09:03

## 2019-04-16 RX ADMIN — DICYCLOMINE HYDROCHLORIDE 20 MG: 20 TABLET ORAL at 09:55

## 2019-04-16 RX ADMIN — BUPROPION HYDROCHLORIDE 100 MG: 100 TABLET, FILM COATED ORAL at 09:03

## 2019-04-16 RX ADMIN — ONDANSETRON HYDROCHLORIDE 4 MG: 4 TABLET, FILM COATED ORAL at 09:55

## 2019-04-16 NOTE — BH NOTE
Patient given tobacco quitline number 59862828418 at this time, refusing to call at this time, states \" I just dont want to quit now\"- patient given information as to the dangers of long term tobacco use. Continue to reinforce the importance of tobacco cessation.

## 2019-04-16 NOTE — GROUP NOTE
Pt did not participate in Cognitive Skills Group at 1000am due to pt was resting in room et declined to attend group when encouraged. Pt c/o not feeling well \"I think I have an ulcer\". RN informed.

## 2019-04-16 NOTE — PLAN OF CARE
Problem: Altered Mood, Depressive Behavior:  Goal: Able to verbalize and/or display a decrease in depressive symptoms  Description  Able to verbalize and/or display a decrease in depressive symptoms  4/16/2019 1031 by Rosi Cogan, LPN  Outcome: Ongoing  Note:   Pt denies thoughts of self harm and is agreeable to seeking out should thoughts of self harm arise. Safe environment maintained. Q15 minute checks for safety cont per unit policy. Will cont to monitor for safety and provides support and reassurance as needed.

## 2019-04-16 NOTE — DISCHARGE SUMMARY
DISCHARGE SUMMARY  Patient ID:  Samia Francis  [id number]  2 y.o.  1960    Admit date: 4/2/2019    Discharge date and time: 4/16/2019  3:03 PM     Admitting Physician: Candance Cypher, MD     Discharge Physician:  TERRI Acharya - CNS    Admission Diagnoses: Severe major depressive disorder ([facility] [address] [F32.2]  Major depression, chronic [F34.1]    Discharge Diagnoses:   Severe major depressive disorder [facility])     Patient Active Problem List   Diagnosis Code    Severe major depressive disorder ([facility] [address] F32.2    Major depression, chronic F34.1    Hydronephrosis N13.30        Admission Condition: poor    Discharged Condition: stable    Indication for Admission: threat to self    History of Present Illnes (present tense wording indicates findings from admission exam on 4/2/2019 and are not necessarily indicative of current findings): Hospital Course:   Upon admission, Samia Francis was provided a safe secure environment, introduced to unit milieu. Patient participated in groups and individual therapies. Meds were adjusted. After 14 days of hospital care, patient began to feel improvement. Depression lifted, thoughts to harm self ceased. Sleep improved, appetite was good. On morning rounds 4/16/2019, patient endorsed feeling ready for discharge. Patient denies suicidal or homicidal ideations, denies hallucinations or delusions. Denies SE's from meds. It was decided that pt had achieved maximum benefit from hospital care and can be discharged     Consults:   Internal Medicine    Significant Diagnostic Studies: Routine labs and diagnostics    Treatments: Psychotropic medications, therapy with group, milieu, and 1:1 with nurses, social workers, PA-C/CNP, and Attending physician.       Discharge Medications:  Discharge Medication List as of 4/16/2019 12:56 PM      START taking these medications    Details   busPIRone (BUSPAR) 5 MG tablet Take 1 tablet by mouth 3 times daily, Disp-42 tablet, R-0Meds to bedsNormal hydrOXYzine (ATARAX) 25 MG tablet Take 1 tablet by mouth 3 times daily as needed for Anxiety, Disp-30 tablet, R-0Normal      buPROPion (WELLBUTRIN) 100 MG tablet Take 1 tablet by mouth daily, Disp-14 tablet, R-0Normal      sertraline (ZOLOFT) 50 MG tablet Take 3 tablets by mouth daily, Disp-42 tablet, R-0Normal      QUEtiapine (SEROQUEL) 100 MG tablet Take 1 tablet by mouth nightly, Disp-14 tablet, R-0Normal                Discharge Exam:  Level of consciousness:  Within normal limits  Appearance: Street clothes, seated, with good grooming  Behavior/Motor: No abnormalities noted  Attitude toward examiner:  Cooperative, attentive, good eye contact  Speech:  spontaneous, normal rate, normal volume and well articulated  Mood:  euthymic  Affect:  mood congruent  Thought processes:  linear, goal directed and coherent  Thought content:  Homocidal ideation denies  Suicidal Ideation:  denies suicidal ideation  Delusions:  no evidence of delusions  Perceptual Disturbance:  denies any perceptual disturbance  Cognition:  In tact  Memory: age appropriate  Insight & Judgement: fair  Medication side effects:  denies     Disposition: to brother's home    Patient Instructions: Activity: activity as tolerated    Follow-up as scheduled with Kaia. Time Spent: 35 minutes    Engagement: Patient displayed a good level of engagement with the treatments offered during this admission. Discharge planning, findings, and recommendations were discussed with the patient and the treatment team.    Signed:  Stacie Warner   4/16/2019  3:03 PM    Dragon voice recognition software used in portions of this document.

## 2019-04-16 NOTE — PLAN OF CARE
Problem: Altered Mood, Depressive Behavior:  Goal: Able to verbalize and/or display a decrease in depressive symptoms  Description  Able to verbalize and/or display a decrease in depressive symptoms  4/15/2019 2124 by Ariana Jhaveri RN  Outcome: Ongoing  Note:   Denies depression     Problem: Altered Mood, Depressive Behavior:  Goal: Ability to disclose and discuss suicidal ideas will improve  Description  Ability to disclose and discuss suicidal ideas will improve  4/15/2019 2124 by Ariana Jhaveri RN  Outcome: Ongoing  Note:   Denies suicidal ideations

## 2019-04-16 NOTE — BH NOTE
[facility]  Discharge Note     Pt belongings: Retrieved from room/safe, reviewed and packed to take with. Dentures: None  Vision - Corrective Lenses: Glasses  Hearing Aid: None  Jewelry: None  Body Piercings Removed: N/A  Clothing: Footwear, Jacket / coat, Pants, Shirt, Undergarments (Comment)  Were All Patient Medications Collected?: Not Applicable  Other Valuables: Cell phone, Money (Comment), Keys       Patient discharged to private residence. Instructed on discharge instructions, pt verbalizes understanding and signs AVS. Pt in control at time of discharge. Pt ambulates to [facility] main entrance with psych staff. Rx filled and sent with patient. No complaints voiced at this time.         Status EXAM upon discharge:  Status and Exam  Normal: No  Facial Expression: Flat  Affect: Appropriate  Level of Consciousness: Alert  Mood:Normal: No  Mood: Anxious  Motor Activity:Normal: Yes  Motor Activity: Decreased  Interview Behavior: Cooperative  Preception: [place] to Person, Nesha Olson to Time, [place] to Place, [place] to Situation  Attention:Normal: Yes  Attention: Distractible  Thought Processes: Circumstantial  Thought Content:Normal: Yes  Thought Content: Preoccupations  Hallucinations: None  Delusions: No  Memory:Normal: Yes  Memory: Poor Recent  Insight and Judgment: No  Insight and Judgment: Poor Judgment  Present Suicidal Ideation: No  Present Homicidal Ideation: No    Jeanie Rios LPN

## 2019-05-22 ENCOUNTER — HOSPITAL ENCOUNTER (INPATIENT)
Age: 59
LOS: 15 days | Discharge: HOME | DRG: 751 | End: 2019-06-06
Attending: EMERGENCY MEDICINE | Admitting: PSYCHIATRY & NEUROLOGY
Payer: MEDICAID

## 2019-05-22 DIAGNOSIS — F32.A DEPRESSION WITH SUICIDAL IDEATION: Primary | ICD-10-CM

## 2019-05-22 DIAGNOSIS — R45.851 DEPRESSION WITH SUICIDAL IDEATION: Primary | ICD-10-CM

## 2019-05-22 PROBLEM — F33.9 MAJOR DEPRESSION, RECURRENT (HCC): Status: ACTIVE | Noted: 2019-05-22

## 2019-05-22 PROBLEM — F33.9 MAJOR DEPRESSIVE DISORDER, RECURRENT (HCC): Status: ACTIVE | Noted: 2019-05-22

## 2019-05-22 LAB
-: ABNORMAL
ABSOLUTE EOS #: 0.3 K/UL (ref 0–0.4)
ABSOLUTE IMMATURE GRANULOCYTE: ABNORMAL K/UL (ref 0–0.3)
ABSOLUTE LYMPH #: 1.2 K/UL (ref 1–4.8)
ABSOLUTE MONO #: 0.7 K/UL (ref 0.1–1.3)
ACETAMINOPHEN LEVEL: <5 UG/ML (ref 10–30)
ALBUMIN SERPL-MCNC: 4 G/DL (ref 3.5–5.2)
ALBUMIN/GLOBULIN RATIO: ABNORMAL (ref 1–2.5)
ALP BLD-CCNC: 91 U/L (ref 35–104)
ALT SERPL-CCNC: 45 U/L (ref 5–33)
AMORPHOUS: ABNORMAL
AMPHETAMINE SCREEN URINE: NEGATIVE
ANION GAP SERPL CALCULATED.3IONS-SCNC: 10 MMOL/L (ref 9–17)
AST SERPL-CCNC: 36 U/L
BACTERIA: ABNORMAL
BARBITURATE SCREEN URINE: NEGATIVE
BASOPHILS # BLD: 1 % (ref 0–2)
BASOPHILS ABSOLUTE: 0.1 K/UL (ref 0–0.2)
BENZODIAZEPINE SCREEN, URINE: NEGATIVE
BILIRUB SERPL-MCNC: 0.31 MG/DL (ref 0.3–1.2)
BILIRUBIN URINE: NEGATIVE
BUN BLDV-MCNC: 15 MG/DL (ref 8–23)
BUN/CREAT BLD: ABNORMAL (ref 9–20)
BUPRENORPHINE URINE: NORMAL
CALCIUM SERPL-MCNC: 8.6 MG/DL (ref 8.6–10.4)
CANNABINOID SCREEN URINE: NEGATIVE
CASTS UA: ABNORMAL /LPF
CHLORIDE BLD-SCNC: 108 MMOL/L (ref 98–107)
CO2: 22 MMOL/L (ref 20–31)
COCAINE METABOLITE, URINE: NEGATIVE
COLOR: YELLOW
COMMENT UA: ABNORMAL
CREAT SERPL-MCNC: 1.2 MG/DL (ref 0.5–0.9)
CRYSTALS, UA: ABNORMAL /HPF
DIFFERENTIAL TYPE: ABNORMAL
EOSINOPHILS RELATIVE PERCENT: 3 % (ref 0–4)
EPITHELIAL CELLS UA: ABNORMAL /HPF
ETHANOL PERCENT: <0.01 %
ETHANOL: <10 MG/DL
GFR AFRICAN AMERICAN: 56 ML/MIN
GFR NON-AFRICAN AMERICAN: 46 ML/MIN
GFR SERPL CREATININE-BSD FRML MDRD: ABNORMAL ML/MIN/{1.73_M2}
GFR SERPL CREATININE-BSD FRML MDRD: ABNORMAL ML/MIN/{1.73_M2}
GLUCOSE BLD-MCNC: 100 MG/DL (ref 70–99)
GLUCOSE URINE: NEGATIVE
HCT VFR BLD CALC: 40.8 % (ref 36–46)
HEMOGLOBIN: 13.4 G/DL (ref 12–16)
IMMATURE GRANULOCYTES: ABNORMAL %
KETONES, URINE: NEGATIVE
LEUKOCYTE ESTERASE, URINE: ABNORMAL
LYMPHOCYTES # BLD: 15 % (ref 24–44)
MCH RBC QN AUTO: 26.8 PG (ref 26–34)
MCHC RBC AUTO-ENTMCNC: 32.8 G/DL (ref 31–37)
MCV RBC AUTO: 81.7 FL (ref 80–100)
MDMA URINE: NORMAL
METHADONE SCREEN, URINE: NEGATIVE
METHAMPHETAMINE, URINE: NORMAL
MONOCYTES # BLD: 9 % (ref 1–7)
MUCUS: ABNORMAL
NITRITE, URINE: NEGATIVE
NRBC AUTOMATED: ABNORMAL PER 100 WBC
OPIATES, URINE: NEGATIVE
OTHER OBSERVATIONS UA: ABNORMAL
OXYCODONE SCREEN URINE: NEGATIVE
PDW BLD-RTO: 13.6 % (ref 11.5–14.9)
PH UA: 5 (ref 5–8)
PHENCYCLIDINE, URINE: NEGATIVE
PLATELET # BLD: 218 K/UL (ref 150–450)
PLATELET ESTIMATE: ABNORMAL
PMV BLD AUTO: 8.8 FL (ref 6–12)
POTASSIUM SERPL-SCNC: 4.3 MMOL/L (ref 3.7–5.3)
PROPOXYPHENE, URINE: NORMAL
PROTEIN UA: NEGATIVE
RBC # BLD: 4.99 M/UL (ref 4–5.2)
RBC # BLD: ABNORMAL 10*6/UL
RBC UA: ABNORMAL /HPF
RENAL EPITHELIAL, UA: ABNORMAL /HPF
SALICYLATE LEVEL: <1 MG/DL (ref 3–10)
SEG NEUTROPHILS: 72 % (ref 36–66)
SEGMENTED NEUTROPHILS ABSOLUTE COUNT: 5.7 K/UL (ref 1.3–9.1)
SODIUM BLD-SCNC: 140 MMOL/L (ref 135–144)
SPECIFIC GRAVITY UA: 1.02 (ref 1–1.03)
TEST INFORMATION: NORMAL
TOTAL CK: 79 U/L (ref 26–192)
TOTAL PROTEIN: 6.8 G/DL (ref 6.4–8.3)
TOXIC TRICYCLIC SC,BLOOD: ABNORMAL
TRICHOMONAS: ABNORMAL
TRICYCLIC ANTIDEP,URINE: NEGATIVE
TRICYCLIC ANTIDEPRESSANTS, UR: NORMAL
TURBIDITY: CLEAR
URINE HGB: NEGATIVE
UROBILINOGEN, URINE: NORMAL
WBC # BLD: 8 K/UL (ref 3.5–11)
WBC # BLD: ABNORMAL 10*3/UL
WBC UA: ABNORMAL /HPF
YEAST: ABNORMAL

## 2019-05-22 PROCEDURE — 6370000000 HC RX 637 (ALT 250 FOR IP): Performed by: EMERGENCY MEDICINE

## 2019-05-22 PROCEDURE — G0480 DRUG TEST DEF 1-7 CLASSES: HCPCS

## 2019-05-22 PROCEDURE — 99285 EMERGENCY DEPT VISIT HI MDM: CPT

## 2019-05-22 PROCEDURE — 80307 DRUG TEST PRSMV CHEM ANLYZR: CPT

## 2019-05-22 PROCEDURE — 80053 COMPREHEN METABOLIC PANEL: CPT

## 2019-05-22 PROCEDURE — 36415 COLL VENOUS BLD VENIPUNCTURE: CPT

## 2019-05-22 PROCEDURE — 6370000000 HC RX 637 (ALT 250 FOR IP): Performed by: NURSE PRACTITIONER

## 2019-05-22 PROCEDURE — 81001 URINALYSIS AUTO W/SCOPE: CPT

## 2019-05-22 PROCEDURE — 1240000000 HC EMOTIONAL WELLNESS R&B

## 2019-05-22 PROCEDURE — 82550 ASSAY OF CK (CPK): CPT

## 2019-05-22 PROCEDURE — 85025 COMPLETE CBC W/AUTO DIFF WBC: CPT

## 2019-05-22 RX ORDER — HYDROXYZINE HYDROCHLORIDE 25 MG/1
25 TABLET, FILM COATED ORAL 3 TIMES DAILY PRN
Status: ON HOLD | COMMUNITY
End: 2019-06-05 | Stop reason: HOSPADM

## 2019-05-22 RX ORDER — TRAZODONE HYDROCHLORIDE 50 MG/1
50 TABLET ORAL NIGHTLY PRN
Status: DISCONTINUED | OUTPATIENT
Start: 2019-05-22 | End: 2019-05-30

## 2019-05-22 RX ORDER — ROPINIROLE 1 MG/1
1 TABLET, FILM COATED ORAL NIGHTLY
Status: ON HOLD | COMMUNITY
End: 2019-06-05 | Stop reason: HOSPADM

## 2019-05-22 RX ORDER — BENZTROPINE MESYLATE 1 MG/ML
2 INJECTION INTRAMUSCULAR; INTRAVENOUS 2 TIMES DAILY PRN
Status: DISCONTINUED | OUTPATIENT
Start: 2019-05-22 | End: 2019-06-06 | Stop reason: HOSPADM

## 2019-05-22 RX ORDER — MAGNESIUM HYDROXIDE/ALUMINUM HYDROXICE/SIMETHICONE 120; 1200; 1200 MG/30ML; MG/30ML; MG/30ML
30 SUSPENSION ORAL EVERY 6 HOURS PRN
Status: DISCONTINUED | OUTPATIENT
Start: 2019-05-22 | End: 2019-06-06 | Stop reason: HOSPADM

## 2019-05-22 RX ORDER — LORAZEPAM 1 MG/1
1 TABLET ORAL ONCE
Status: COMPLETED | OUTPATIENT
Start: 2019-05-22 | End: 2019-05-22

## 2019-05-22 RX ORDER — HYDROXYZINE HYDROCHLORIDE 25 MG/1
25 TABLET, FILM COATED ORAL 3 TIMES DAILY PRN
Status: DISCONTINUED | OUTPATIENT
Start: 2019-05-22 | End: 2019-05-25

## 2019-05-22 RX ORDER — NICOTINE 21 MG/24HR
1 PATCH, TRANSDERMAL 24 HOURS TRANSDERMAL DAILY
Status: DISCONTINUED | OUTPATIENT
Start: 2019-05-22 | End: 2019-05-24

## 2019-05-22 RX ADMIN — LORAZEPAM 1 MG: 1 TABLET ORAL at 17:31

## 2019-05-22 RX ADMIN — HYDROXYZINE HYDROCHLORIDE 25 MG: 25 TABLET, FILM COATED ORAL at 19:51

## 2019-05-22 RX ADMIN — LORAZEPAM 1 MG: 1 TABLET ORAL at 15:33

## 2019-05-22 RX ADMIN — TRAZODONE HYDROCHLORIDE 50 MG: 50 TABLET ORAL at 21:08

## 2019-05-22 ASSESSMENT — PAIN SCALES - GENERAL
PAINLEVEL_OUTOF10: 6
PAINLEVEL_OUTOF10: 9

## 2019-05-22 ASSESSMENT — SLEEP AND FATIGUE QUESTIONNAIRES
AVERAGE NUMBER OF SLEEP HOURS: 8
DO YOU USE A SLEEP AID: NO
DO YOU HAVE DIFFICULTY SLEEPING: NO

## 2019-05-22 ASSESSMENT — ENCOUNTER SYMPTOMS
SHORTNESS OF BREATH: 0
COUGH: 0
ABDOMINAL PAIN: 0

## 2019-05-22 ASSESSMENT — PAIN DESCRIPTION - LOCATION
LOCATION: GENERALIZED
LOCATION: GENERALIZED

## 2019-05-22 ASSESSMENT — PATIENT HEALTH QUESTIONNAIRE - PHQ9: SUM OF ALL RESPONSES TO PHQ QUESTIONS 1-9: 14

## 2019-05-22 ASSESSMENT — LIFESTYLE VARIABLES: HISTORY_ALCOHOL_USE: YES

## 2019-05-22 NOTE — PROGRESS NOTES
Medication History completed:    New medications: hydroxyzine    Medications discontinued: none    Changes to dosing: none    Stated allergies: As listed    Other pertinent information: Medications confirmed with patient.      Thank you,  Missy Levin, PharmD  [phone]

## 2019-05-22 NOTE — ED NOTES
Provisional Diagnosis:   Major Depressive Disorder     Psychosocial and Contextual Factors:   Patient has substance abuse issues. Patient has housing issues.      C-SSRS Summary:       Patient: X  Family:   Agency:         Present Suicidal Behavior:  X     Verbal: Patient reports a plan to overdose on medications.      Attempt: Patient denies.      Past Suicidal Behavior: X      Verbal: Patient reports feeling suicidal in the past.      Attempt: Patient denies.      Substance Abuse: Patient reports a history of marijuana, cocaine and crack.      Self-Injurious/Self-Mutilation: Patient denies.      Trauma Identified:   Patient denies.         Protective Factors:    Patient has housing. Patient has insurance. Patient is linked with mental health agency. Patient is compliant with psych medications.      Risk Factors:    Patient has a history substance abuse issues. Patient has recent inpatient psychiatric admission.      Clinical Summary:    Patient is a 59year old  female that is brought to the ED by her therapist for suicidal ideations. Patients therapist stated they met yesterday and the patient disclosed suicidal ideations but was able to contract for safety. Therapist states today when checking in the with patient she was no longer able to contract for safety but was agreeable to seeking treatment at the hospital.     Patient reports having a dream last night that she committed suicide. Patient reports when she woke up her suicidal ideations were much more intense. Patient reports a plan to overdose. Patient will not specify what she will overdose on but states \"if you take enough of anything it will work. \" Patient denies any previous attempts. It is noted patient was recently hospitalized in the [facility] from 4/2/19-4/16/19. Patient reports she is now linked with [facility] and is compliant with her psych medications. Patient states she does have concern that her psych medications \"aren't working. \" Patient reports upon being discharged from the [facility] she \"went right back to partying. \" Patient reports she was using marijuana, cocaine and crack. Patient reports she was able to detox through [address] [place] [address] [facility] and recently moved into their transitional sober housing. Patient reports last Friday one of her housemates committed suicide and she has been very upset. Patient reports she feels responsible for her death. Patient denies any legal issues.      Patient is voluntary.         Level of Care Disposition:    Writer consulted with Mayte Stewart CNP. Patient to be admitted to the [facility] for safety and stabilization.

## 2019-05-22 NOTE — ED PROVIDER NOTES
[address] ED  eMERGENCY dEPARTMENT eNCOUnter      Pt Name: Samia Francis  MRN: [id number]  [place] 1960  Date of evaluation: 5/22/19      CHIEF COMPLAINT       Chief Complaint   Patient presents with    Suicidal     HISTORY OF PRESENT ILLNESS   HPI 59 y.o. female Was brought to the emergency department by her therapist.  She is currently living in transitional housing. She feels like her psychiatric medications are \"not working\". A friend recently killed herself. Pt has been dreaming about committing suicide. Pt reported to her therapist that she would overdose on pills. Pt has a h/o substance, previous cocaine use. Patient has a recent admission to the [facility] for depression and suicidal thoughts. She denies any recent drug use, states she has been clean for 20 days. She denies any attempt at self harm. REVIEW OF SYSTEMS       Review of Systems   Constitutional: Negative for fever. HENT: Negative for congestion. Respiratory: Negative for cough and shortness of breath. Cardiovascular: Negative for chest pain. Gastrointestinal: Negative for abdominal pain. Genitourinary: Negative for dysuria. Musculoskeletal: Positive for myalgias (for several weeks). Skin: Negative for rash. Neurological: Negative for headaches. Psychiatric/Behavioral: Positive for dysphoric mood, sleep disturbance and suicidal ideas.        PAST MEDICAL HISTORY     Past Medical History:   Diagnosis Date    Anxiety d    Cancer ([address]     Depression     Hypertension     PTSD (post-traumatic stress disorder)     Thyroid disease        SURGICAL HISTORY       Past Surgical History:   Procedure Laterality Date    HYSTERECTOMY      JOINT REPLACEMENT         CURRENT MEDICATIONS       Previous Medications    BUPROPION (WELLBUTRIN) 100 MG TABLET    Take 1 tablet by mouth daily    BUSPIRONE (BUSPAR) 5 MG TABLET    Take 1 tablet by mouth 3 times daily    QUETIAPINE (SEROQUEL) 100 MG TABLET    Take 1 tablet by mouth nightly    SERTRALINE (ZOLOFT) 50 MG TABLET    Take 3 tablets by mouth daily       ALLERGIES     is allergic to clindamycin/lincomycin; demerol hcl [meperidine]; flagyl [metronidazole]; and pcn [penicillins]. FAMILY HISTORY     indicated that the status of her father is unknown. She indicated that the status of her brother is unknown. SOCIAL HISTORY      reports that she has never smoked. She has never used smokeless tobacco. She reports that she drank alcohol. She reports that she has current or past drug history. Drugs: Cocaine and Marijuana. PHYSICAL EXAM     INITIAL VITALS: /83   Pulse 86   Temp 98.2 °F (36.8 °C) (Oral)   Resp 16   Ht 5' 2\" (1.575 m)   Wt 200 lb (90.7 kg)   LMP  (Exact Date)   SpO2 99%   BMI 36.58 kg/m²   Gen: NAD  Head: Normocephalic, atraumatic  Eye: Pupils equal round reactive to light, no conjunctivitis  Heart: Regular rate and rhythm no murmurs  Lungs: Clear to auscultation bilaterally, no respiratory distress  Chest wall: No crepitus, no tenderness palpation  Abdomen: Soft, nontender, nondistended, with no peritoneal signs  Neurologic: Patient is alert and oriented x3, motor and sensation is intact in all 4 extremities, cerebellar function is normal  Extremities: Full range of motion, no cyanosis, no edema, no signs of trauma, no tenderness to palpation    MEDICAL DECISION MAKING:     MDM  59 y.o. female with depression, suicidal thoughts + suicidal plan. SHe does not appear intoxicated. SHe is showing no signs of any acute active toxidrome. SHe denies any overdose attempt. We'll check a cpk given her myalgias. We'll screen for any acetaminophen or salicylate ingestion, we'll check baseline renal function, liver function, a drug screen, cbc and reassess. I reviewed the patient's recent laboratory, and they are unremarkable  except for mild LFT elevations which are improving.    The patient is medically clear for psychiatric evaluation, and will

## 2019-05-23 PROBLEM — R45.851 DEPRESSION WITH SUICIDAL IDEATION: Status: ACTIVE | Noted: 2019-04-02

## 2019-05-23 PROBLEM — F32.A DEPRESSION WITH SUICIDAL IDEATION: Status: ACTIVE | Noted: 2019-04-02

## 2019-05-23 PROCEDURE — 6370000000 HC RX 637 (ALT 250 FOR IP): Performed by: NURSE PRACTITIONER

## 2019-05-23 PROCEDURE — 1240000000 HC EMOTIONAL WELLNESS R&B

## 2019-05-23 PROCEDURE — 90792 PSYCH DIAG EVAL W/MED SRVCS: CPT | Performed by: NURSE PRACTITIONER

## 2019-05-23 RX ORDER — ROPINIROLE 2 MG/1
2 TABLET, FILM COATED ORAL NIGHTLY
Status: DISCONTINUED | OUTPATIENT
Start: 2019-05-23 | End: 2019-06-06 | Stop reason: HOSPADM

## 2019-05-23 RX ORDER — QUETIAPINE FUMARATE 200 MG/1
200 TABLET, FILM COATED ORAL NIGHTLY
Status: DISCONTINUED | OUTPATIENT
Start: 2019-05-23 | End: 2019-05-31

## 2019-05-23 RX ORDER — QUETIAPINE FUMARATE 100 MG/1
100 TABLET, FILM COATED ORAL NIGHTLY
Status: DISCONTINUED | OUTPATIENT
Start: 2019-05-23 | End: 2019-05-23

## 2019-05-23 RX ORDER — BUSPIRONE HYDROCHLORIDE 5 MG/1
5 TABLET ORAL 3 TIMES DAILY
Status: DISCONTINUED | OUTPATIENT
Start: 2019-05-23 | End: 2019-05-23

## 2019-05-23 RX ORDER — BUPROPION HYDROCHLORIDE 100 MG/1
100 TABLET ORAL DAILY
Status: DISCONTINUED | OUTPATIENT
Start: 2019-05-23 | End: 2019-06-04

## 2019-05-23 RX ORDER — BUSPIRONE HYDROCHLORIDE 10 MG/1
10 TABLET ORAL 3 TIMES DAILY
Status: DISCONTINUED | OUTPATIENT
Start: 2019-05-23 | End: 2019-06-01

## 2019-05-23 RX ADMIN — BUPROPION HYDROCHLORIDE 100 MG: 100 TABLET, FILM COATED ORAL at 13:56

## 2019-05-23 RX ADMIN — HYDROXYZINE HYDROCHLORIDE 25 MG: 25 TABLET, FILM COATED ORAL at 12:48

## 2019-05-23 RX ADMIN — SERTRALINE HYDROCHLORIDE 150 MG: 100 TABLET ORAL at 13:56

## 2019-05-23 RX ADMIN — ROPINIROLE HYDROCHLORIDE 2 MG: 2 TABLET, FILM COATED ORAL at 21:12

## 2019-05-23 RX ADMIN — QUETIAPINE FUMARATE 200 MG: 200 TABLET ORAL at 21:12

## 2019-05-23 RX ADMIN — BUSPIRONE HYDROCHLORIDE 10 MG: 10 TABLET ORAL at 21:12

## 2019-05-23 RX ADMIN — HYDROXYZINE HYDROCHLORIDE 25 MG: 25 TABLET, FILM COATED ORAL at 03:54

## 2019-05-23 RX ADMIN — HYDROXYZINE HYDROCHLORIDE 25 MG: 25 TABLET, FILM COATED ORAL at 21:12

## 2019-05-23 RX ADMIN — TRAZODONE HYDROCHLORIDE 50 MG: 50 TABLET ORAL at 21:12

## 2019-05-23 RX ADMIN — BUSPIRONE HYDROCHLORIDE 5 MG: 5 TABLET ORAL at 13:56

## 2019-05-23 ASSESSMENT — ENCOUNTER SYMPTOMS
CONSTIPATION: 0
BLOOD IN STOOL: 0
BACK PAIN: 1
COUGH: 0
SINUS PAIN: 0
VOMITING: 0
SHORTNESS OF BREATH: 0
EYE REDNESS: 0
COLOR CHANGE: 0
SORE THROAT: 0
CHEST TIGHTNESS: 0
DIARRHEA: 0
EYE PAIN: 0
ABDOMINAL PAIN: 0
NAUSEA: 0
EYE DISCHARGE: 0

## 2019-05-23 ASSESSMENT — LIFESTYLE VARIABLES: HISTORY_ALCOHOL_USE: YES

## 2019-05-23 NOTE — PLAN OF CARE
Pt did not eat breakfast and does not have any meds due, sleeps through the night, attends ADL's, denies suicidal thoughts and hallucinations.

## 2019-05-23 NOTE — PLAN OF CARE
care    Method:group therapy, medication compliance, individualized assessments and care planning    Outcome: needs reinforcement    PATIENT GOALS: to be discussed with patient within 72 hours    PLAN/TREATMENT RECOMMENDATIONS:     continue group therapy , medications compliance, goal setting, individualized assessments and care, continue to monitor pt on unit      SHORT-TERM GOALS:   Time frame for Short-Term Goals: 5-7 days    LONG-TERM GOALS:  Time frame for Long-Term Goals: 6 months  Members Present in Team Meeting: See Signature Sheet    Edward Restrepo
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
## 2019-05-23 NOTE — GROUP NOTE
Group Therapy Note    Date: May 23    Group Start Time: 1350  Group End Time: 7370  Group Topic: Cognitive Skills    RIMMA Medina, CTRS    Patient refused to attend Cognitive Skills Therapeutic Group after encouragement from staff. 1:1 talk time offered but patient refused.       Signature:  Bailey Galan

## 2019-05-23 NOTE — GROUP NOTE
Group Therapy Note    Date: May 23    Group Start Time: 1330  Group End Time: 7567  Group Topic: Relapse Prevention    [facility]    John Masker, CTRS    Patient did not attend [facility] community education session offered on unit at 1330.       Signature:  Elmer Nguyen

## 2019-05-23 NOTE — H&P
HISTORY and [facility] [address]       NAME:  Teressa Kiser  MRN: [id number]   YOB: 1960   Date: 5/23/2019   Age: 59 y.o. Gender: female     COMPLAINT AND PRESENT HISTORY:      Teressa Kiser is a 59 y.o.  female, admitted because of increasing depression with suicidal ideation. Patient had plan to overdose. Patient denies history of previous suicide attempt. Patient denies any homicidal ideation. Patient reports increased stress, girl in her sober living facility recently committed suicide, feels her medications are not working. Patient feeling hopeless, helpless, worthless. In the past few weeks, patient states that sleep has been poor, appetite has been decreased, energy level has been low. No auditory, visual or tactile hallucinations. Patient denies any current alcohol or substance abuse, history of crack cocaine abuse, has been clean for past 21 days, toxicology negative. Patient lives in sober living facility. Patient claims to be compliant with psychiatric medications. Patient complaining of dysuria, hematuria, urinalysis with small leuk est, no hematuria. No flank pain, no nausea/vomiting, no fever/chills. No other somatic complaints, she denies recent chest pain or shortness of breath.      DIAGNOSTIC RESULTS   Labs:  CBC:   Recent Labs     05/22/19  1456   WBC 8.0   HGB 13.4        BMP:    Recent Labs     05/22/19  1456      K 4.3   *   CO2 22   BUN 15   CREATININE 1.20*   GLUCOSE 100*     Hepatic:   Recent Labs     05/22/19  1456   AST 36*   ALT 45*   BILITOT 0.31   ALKPHOS 91     Urinalysis:   Lab Results   Component Value Date    COLORU YELLOW 05/22/2019    WBCUA 2 TO 5 05/22/2019    RBCUA 0 TO 2 05/22/2019    MUCUS NOT REPORTED 05/22/2019    BACTERIA FEW 05/22/2019    SPECGRAV 1.019 05/22/2019    LEUKOCYTESUR SMALL 05/22/2019    GLUCOSEU NEGATIVE 05/22/2019    AMORPHOUS NOT REPORTED 05/22/2019     PAST MEDICAL HISTORY     Past Medical History: Allergen Reactions    Clindamycin/Lincomycin     Demerol Hcl [Meperidine]     Flagyl [Metronidazole]     Pcn [Penicillins]      No current facility-administered medications on file prior to encounter. Current Outpatient Medications on File Prior to Encounter   Medication Sig Dispense Refill    hydrOXYzine (ATARAX) 25 MG tablet Take 25 mg by mouth 3 times daily as needed for Anxiety      rOPINIRole (REQUIP) 1 MG tablet Take 1 mg by mouth nightly      busPIRone (BUSPAR) 5 MG tablet Take 1 tablet by mouth 3 times daily 42 tablet 0    buPROPion (WELLBUTRIN) 100 MG tablet Take 1 tablet by mouth daily 14 tablet 0    sertraline (ZOLOFT) 50 MG tablet Take 3 tablets by mouth daily 42 tablet 0    QUEtiapine (SEROQUEL) 100 MG tablet Take 1 tablet by mouth nightly 14 tablet 0      Review of Systems   Constitutional: Negative for chills, diaphoresis and fever. HENT: Negative for congestion, ear discharge, ear pain, sinus pain and sore throat. Eyes: Negative for pain, discharge, redness and visual disturbance. Respiratory: Negative for cough, chest tightness and shortness of breath. Cardiovascular: Negative for chest pain, palpitations and leg swelling. Gastrointestinal: Negative for abdominal pain, blood in stool, constipation, diarrhea, nausea and vomiting. Endocrine: Negative for cold intolerance, heat intolerance, polydipsia, polyphagia and polyuria. Genitourinary: Positive for dysuria. Negative for flank pain, frequency, hematuria and urgency. Musculoskeletal: Positive for arthralgias and back pain. Negative for neck pain and neck stiffness. Skin: Negative for color change, pallor, rash and wound. Neurological: Negative for dizziness, tremors, seizures, speech difficulty, weakness, light-headedness, numbness and headaches. Psychiatric/Behavioral: Positive for dysphoric mood, sleep disturbance and suicidal ideas. Negative for decreased concentration, hallucinations and self-injury.  The patient is not nervous/anxious. GENERAL PHYSICAL EXAM:     Vitals: /73   Pulse 65   Temp 97.7 °F (36.5 °C)   Resp 14   Ht 5' 2\" (1.575 m)   Wt 206 lb (93.4 kg)   LMP  (Exact Date)   SpO2 100%   BMI 37.68 kg/m²  Body mass index is 37.68 kg/m². Pt was examined with a nurse present in the room. GENERAL APPEARANCE: Jacquelin Shaw is a 59 y.o.   female, moderately obese, nourished, conscious, alert. Does not appear to be distress or pain at this time. Patient is cooperative with examination. Mood is depressed, affect is congruent. Grooming and hygiene are appropriate. SKIN:  Normal temperature, turgor and texture. No cyanosis or jaundice. HEAD:  Normocephalic, atraumatic. EYES:  Pupils equal, reactive to light and accomodation. Conjunctiva clear. EOMs intact bilaterally. THROAT:  Mucous membranes are moist. No tonsillar erythema or exudates. Uvula is midline. NECK:  No stiffness, trachea central.  No palpable masses. CHEST:  Symmetrical and equal on expansion. HEART:  Normotensive. Regular rate and rhythm. No murmur. LUNGS:  Equal on expansion. Clear to auscultation bilaterally with no adventitious sounds. ABDOMEN:  Obese. Soft on palpation. No localized tenderness, guarding or rigidity. No palpable masses or organomegaly. LYMPHATICS:  No cervical lymphadenopathy. LOCOMOTOR, BACK AND SPINE:  No tenderness or deformities. No flank tenderness. EXTREMITIES:  Symmetrical with no pretibial/pedal edema. No discoloration or ulcerations. No warmth, tenderness, erythema noted in lower legs bilaterally. NEUROLOGIC:  The patient is conscious, alert, oriented x 3. No focal sensory or motor deficits, muscle strength is equal bilaterally. No facial droop, tongue protrudes centrally, no slurring of the speech. Cranial nerve exam reveals no deficits. No tremor.              PROVISIONAL DIAGNOSES:      Active Problems:    Major depressive disorder, recurrent ([facility] [address]  Resolved Problems:    * No resolved hospital problems. Magdalene Long PA-C on 5/23/2019 at 12:38 PM    Please note that this chart was generated using voice recognition Dragon dictation software. Although every effort was made to ensure the accuracy of this automated transcription, some errors intranscription may have occurred.
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
## 2019-05-23 NOTE — GROUP NOTE
Group Therapy Note    Date: May 23    Group Start Time: 1100  Group End Time: 1075  Group Topic: Psychotherapy    Via Imelda Mcmullen, MSW, LSW        Group Therapy Note    Attendees: 4/10         Patient declined to attend 11:00 am psychotherapy group even when encouraged by clinician.  1:1 offered and refused

## 2019-05-23 NOTE — GROUP NOTE
Group Therapy Note    Date: May 23    Group Start Time: 1000  Group End Time: 2629  Group Topic: Recreational    [address], [place]    Patient refused to attend Recreational Therapy Group at 1000 after encouragement from staff. 1:1 talk time offered but patient refused.        Signature:  Claudean Crate

## 2019-05-24 LAB
CHOLESTEROL, FASTING: 194 MG/DL
CHOLESTEROL/HDL RATIO: 4
HDLC SERPL-MCNC: 48 MG/DL
LDL CHOLESTEROL: 114 MG/DL (ref 0–130)
TRIGLYCERIDE, FASTING: 160 MG/DL
TSH SERPL DL<=0.05 MIU/L-ACNC: 2.36 MIU/L (ref 0.3–5)
VLDLC SERPL CALC-MCNC: ABNORMAL MG/DL (ref 1–30)

## 2019-05-24 PROCEDURE — 1240000000 HC EMOTIONAL WELLNESS R&B

## 2019-05-24 PROCEDURE — 84443 ASSAY THYROID STIM HORMONE: CPT

## 2019-05-24 PROCEDURE — 6370000000 HC RX 637 (ALT 250 FOR IP): Performed by: NURSE PRACTITIONER

## 2019-05-24 PROCEDURE — 99232 SBSQ HOSP IP/OBS MODERATE 35: CPT | Performed by: NURSE PRACTITIONER

## 2019-05-24 PROCEDURE — 83036 HEMOGLOBIN GLYCOSYLATED A1C: CPT

## 2019-05-24 PROCEDURE — 80061 LIPID PANEL: CPT

## 2019-05-24 PROCEDURE — 36415 COLL VENOUS BLD VENIPUNCTURE: CPT

## 2019-05-24 RX ORDER — ONDANSETRON 4 MG/1
4 TABLET, ORALLY DISINTEGRATING ORAL EVERY 8 HOURS PRN
Status: DISCONTINUED | OUTPATIENT
Start: 2019-05-24 | End: 2019-06-06 | Stop reason: HOSPADM

## 2019-05-24 RX ADMIN — BUPROPION HYDROCHLORIDE 100 MG: 100 TABLET, FILM COATED ORAL at 08:56

## 2019-05-24 RX ADMIN — BUSPIRONE HYDROCHLORIDE 10 MG: 10 TABLET ORAL at 08:57

## 2019-05-24 RX ADMIN — TRAZODONE HYDROCHLORIDE 50 MG: 50 TABLET ORAL at 20:33

## 2019-05-24 RX ADMIN — BUSPIRONE HYDROCHLORIDE 10 MG: 10 TABLET ORAL at 14:35

## 2019-05-24 RX ADMIN — ROPINIROLE HYDROCHLORIDE 2 MG: 2 TABLET, FILM COATED ORAL at 20:34

## 2019-05-24 RX ADMIN — HYDROXYZINE HYDROCHLORIDE 25 MG: 25 TABLET, FILM COATED ORAL at 20:33

## 2019-05-24 RX ADMIN — BUSPIRONE HYDROCHLORIDE 10 MG: 10 TABLET ORAL at 20:33

## 2019-05-24 RX ADMIN — SERTRALINE HYDROCHLORIDE 150 MG: 100 TABLET ORAL at 08:58

## 2019-05-24 RX ADMIN — QUETIAPINE FUMARATE 200 MG: 200 TABLET ORAL at 20:33

## 2019-05-24 RX ADMIN — HYDROXYZINE HYDROCHLORIDE 25 MG: 25 TABLET, FILM COATED ORAL at 10:02

## 2019-05-24 RX ADMIN — ALUMINUM HYDROXIDE, MAGNESIUM HYDROXIDE, AND SIMETHICONE 30 ML: 200; 200; 20 SUSPENSION ORAL at 10:03

## 2019-05-24 NOTE — PROGRESS NOTES
Department of Psychiatry  Attending Progress Note    Chief Complaint: Major depressive disorder, recurrent ([address]     SUBJECTIVE:  Patient is seen today in the day area and in treatment team this morning. She reports feeling sick this morning with nausea and vomiting. She continues to be depressed and anxious. She reports fleeting suicidal thoughts and poor sleep. Reports feeling hopeless and helpless at times about situation and cannot contract for safety outside of hospital setting. Explored her  feelings and concerns. Reassurance and support provided. Charting and medications reviewed. There is no identifiable safe alternative other than continued hospitalization.        OBJECTIVE    Physical  BP (!) 106/51   Pulse 65   Temp 97.6 °F (36.4 °C) (Oral)   Resp 14   Ht 5' 2\" (1.575 m)   Wt 206 lb (93.4 kg)   LMP  (Exact Date)   SpO2 100%   BMI 37.68 kg/m²      Mental Status Evaluation:  Orientation: alertness: alert   Mood:. anxious and depressed      Affect:  blunted      Appearance:  casually dressed   Activity:  Restless & fidgety   Gait/Posture: Normal   Speech:  normal pitch and normal volume   Thought Process:  circumstantial   Thought Content:  suicidal   Sensorium:  person, place and time/date   Cognition:  grossly intact   Memory: intact   Insight:  fair   Judgment: fair   Suicidal Ideations: active   Homicidal Ideations: Negative for homicidal ideation      Medication Side Effects: absent       Attention Span attention span appeared shorter than expected for age     Medications  Current Facility-Administered Medications   Medication Dose Route Frequency Provider Last Rate Last Dose    ondansetron (ZOFRAN-ODT) disintegrating tablet 4 mg  4 mg Oral Q8H PRN TERRI Canales CNP        buPROPion [facility]) tablet 100 mg  100 mg Oral Daily LacretiTERRI Wallace CNP   100 mg at 05/24/19 0856    sertraline (ZOLOFT) tablet 150 mg  150 mg Oral Daily Lacretia TERRI Donahue CNP   150 mg at

## 2019-05-24 NOTE — PROGRESS NOTES
Charting done this shift reviewed by Electronically signed by Evangelina Gotti RN on 5/23/2019 at 10:55 PM

## 2019-05-24 NOTE — GROUP NOTE
Group Therapy Note    Date: May 24    Group Start Time: 1100  Group End Time: 5924  Group Topic: Psychotherapy    [address], MSW, LSW    Pt declined to attend psychotherapy at 1100 am despite encouragement. Pt offered 1:1 and refused.

## 2019-05-24 NOTE — PLAN OF CARE
Problem: Altered Mood, Depressive Behavior:  Goal: Able to verbalize acceptance of life and situations over which he or she has no control  Description  Able to verbalize acceptance of life and situations over which he or she has no control  5/24/2019 0937 by Mont Leventhal, RN  Outcome: Ongoing     Problem: Altered Mood, Depressive Behavior:  Goal: Ability to disclose and discuss suicidal ideas will improve  Description  Ability to disclose and discuss suicidal ideas will improve  5/24/2019 0937 by Mont Leventhal, RN  Outcome: Ongoing   Patient is calm, controlled, and medication compliant. Patient denies suicidal ideations but reports feelings of depression and anxiety. Patient appears flat and is isolative to room. Patient reports auditory disturbances, \" she hears her name called for the past couple days\". Patient is polite, is eating and sleeping adequately with safety checks Q15min and at irregular intervals.

## 2019-05-24 NOTE — GROUP NOTE
Group Therapy Note    Date: May 24    Group Start Time: 1100  Group End Time: 8098  Group Topic: Psychotherapy    [facility] MSWM[facility]        Group Therapy Note    Attendees: 4/11         Patient's Goal:  Communication patters, interpersonal relationships     Notes: Active participant in group discussions; identified personal barriers and discussed opportunites for growth/positive coping     Status After Intervention:  Unchanged    Participation Level: Interactive    Participation Quality: Appropriate, Attentive and Sharing      Speech:  normal      Thought Process/Content: Logical  Linear      Affective Functioning: Congruent      Mood: euthymic      Level of consciousness:  Alert, Oriented x4 and Attentive      Response to Learning: Able to verbalize current knowledge/experience and Capable of insight      Endings: None Reported    Modes of Intervention: Support, Socialization, Problem-solving and Confrontation      Discipline Responsible: /Counselor      Signature:   NELY Franco LSW

## 2019-05-24 NOTE — GROUP NOTE
Group Therapy Note    Date: May 23    Group Start Time: 0830  Group End Time: 0900  Group Topic: Wrap-Up    [facility]    Sravanthi Irving LPN    Patient actively participated in 2030 Wrap-up group therapy session.      Group Therapy Note    Attendees: 9               Signature:  Sravanthi Irving LPN

## 2019-05-24 NOTE — GROUP NOTE
Group Therapy Note    Date: May 24    Group Start Time: 1440  Group End Time: 1525  Group Topic: Recreational    STBRUN[facility] WILTON [place], [place]    Attendees: 12         Patient's Goal:  To demonstrate increased interpersonal skills. Notes:  Patient attended group and actively participated in task at hand. Patient conversed appropriately with peers and William Olguin. Status After Intervention:  Improved    Participation Level:  Active Listener and Interactive    Participation Quality: Appropriate, Attentive, Sharing and Supportive      Speech:  normal      Thought Process/Content: Logical      Affective Functioning: Congruent      Level of consciousness:  Alert and Attentive      Response to Learning: Able to verbalize current knowledge/experience, Able to verbalize/acknowledge new learning, Able to retain information, Capable of insight and Progressing to goal      Endings: None Reported    Modes of Intervention: Socialization, Exploration, Clarifying, Problem-solving, Activity, Media and Reality-testing      Discipline Responsible: Psychoeducational Specialist      Signature:  Ann Marie Patricia

## 2019-05-24 NOTE — PLAN OF CARE
[facility]  Day 3 Interdisciplinary Treatment Plan NOTE    Review Date & Time: 5/24/2019   0934    Patient was in treatment team    Admission Type:   Admission Type: Voluntary    Reason for admission:  Reason for Admission: Depression with Suicidal thoughts  Estimated Length of Stay Update:  5-7 days   Estimated Discharge Date Update: to be determined by physician     PATIENT STRENGTHS:  Patient Strengths Strengths: Communication, No significant Physical Illness, Connection to output provider, Positive Support, Social Skills, Medication Compliance  Patient Strengths and Limitations:Limitations: Hopeless about future  Addictive Behavior:Addictive Behavior  In the past 3 months, have you felt or has someone told you that you have a problem with:  : None  Do you have a history of Chemical Use?: No  Do you have a history of Alcohol Use?: Yes  Do you have a history of Street Drug Abuse?: Yes  Histroy of Prescripton Drug Abuse?: No  Medical Problems:  Past Medical History:   Diagnosis Date    Anxiety d    Cancer ([facility] [address]     Depression     Hypertension     PTSD (post-traumatic stress disorder)     Thyroid disease        Risk:  Fall RiskTotal: 59  Kenji Scale Kenji Scale Score: 22  BVC Total: 0  Change in scores0. Changes to plan of Care 0    Status EXAM:   Status and Exam  Normal: No  Facial Expression: Flat, Sad  Affect: Appropriate  Level of Consciousness: Alert  Mood:Normal: No  Mood: Anxious  Motor Activity:Normal: Yes  Motor Activity: Repetitive Acts, Increased  Interview Behavior: Cooperative  Preception: [place] to Person, [place] to Time, [place] to Place, [place] to Situation  Attention:Normal: No  Attention: Distractible  Thought Processes: Circumstantial  Thought Content:Normal: No  Thought Content: Preoccupations  Hallucinations:  Auditory (Comment)(states someone is calling her name)  Delusions: No  Memory:Normal: Yes  Memory: Poor Recent  Insight and Judgment: No  Insight and Judgment: Poor Judgment  Present Suicidal Ideation: No  Present Homicidal Ideation: No    Daily Assessment Last Entry:   Daily Sleep (WDL): Within Defined Limits         Patient Currently in Pain: No  Daily Nutrition (WDL): Within Defined Limits    Patient Monitoring:  Frequency of Checks: 4 times per hour, close    Psychiatric Symptoms:   Depression Symptoms  Depression Symptoms: Loss of interest, Isolative  Anxiety Symptoms  Anxiety Symptoms: Generalized  Betina Symptoms  [place] Symptoms: No problems reported or observed. Psychosis Symptoms  Delusion Type: No problems reported or observed. Suicide Risk CSSR-S:  1) Within the past month, have you wished you were dead or wished you could go to sleep and not wake up? : Yes  2) Have you actually had any thoughts of killing yourself? : Yes  3) Have you been thinking about how you might kill yourself? : No(I dreamt about killing myself)  5) Have you started to work out or worked out the details of how to kill yourself? Do you intend to carry out this plan? : No  6) Have you ever done anything, started to do anything, or prepared to do anything to end your life?: No  Change in Result0 Change in Plan of care0      EDUCATION:   Learner Progress Toward Treatment Goals: Reviewed results and recommendations of this team    Method: Small group    Outcome: Needs reinforcement    PATIENT GOALS: STG: To work on depression and anxiety   LTG: To remain medication compliant, follow up with [facility] and to stay sober.      PLAN/TREATMENT RECOMMENDATIONS UPDATE: group therapy     GOALS UPDATE:   Time frame for Short-Term Goals: 5-7 days       [address]

## 2019-05-24 NOTE — PLAN OF CARE
Problem: Pain:  Goal: Pain level will decrease  Description  Pain level will decrease  5/23/2019 2133 by Sandip Lopez  Outcome: Ongoing     Problem: Altered Mood, Depressive Behavior:  Goal: Able to verbalize acceptance of life and situations over which he or she has no control  Description  Able to verbalize acceptance of life and situations over which he or she has no control  5/23/2019 2133 by Sandip Lopez  Outcome: Ongoing     Problem: Altered Mood, Depressive Behavior:  Goal: Ability to disclose and discuss suicidal ideas will improve  Description  Ability to disclose and discuss suicidal ideas will improve  5/23/2019 2133 by Sandip Lopez  Outcome: Ongoing   Patient denies si, hi, ah, and vh. Patient mentioned she is no longer having si dreams and is feeling better. Patient does not have any complaint of pain at this time. Patient attended evening group. Patient is med compliant. Q15 min checks maintained.

## 2019-05-24 NOTE — GROUP NOTE
Group Therapy Note    Date: May 24    Group Start Time: 1330  Group End Time: 1825  Group Topic: Cognitive Skills    [facility]        Group Therapy Note    Attendees: 11/20         Patient's Goal:  Patient will demonstrate increased interpersonal interaction     Notes:  Patient attended group and participated fully. Status After Intervention:  Improved    Participation Level:  Active Listener and Interactive    Participation Quality: Appropriate, Attentive and Sharing      Speech:  normal      Thought Process/Content: Logical      Affective Functioning: Congruent      Mood: euthymic      Level of consciousness:  Alert and Oriented x4      Response to Learning: Progressing to goal      Endings: None Reported    Modes of Intervention: Education, Socialization and Activity      Discipline Responsible: Psychoeducational Specialist      Signature:  Abhay Paz

## 2019-05-24 NOTE — GROUP NOTE
Group Therapy Note    Date: May 24    Group Start Time: 1600  Group End Time: 1700  Group Topic: Healthy Living/Wellness    [facility]    Serena Saavedra LPN        Group Therapy Note    Attendees: 12         Patient's Goal:  relaxation    Notes:  n/a    Status After Intervention:  Improved    Participation Level:  Active Listener    Participation Quality: Appropriate      Speech:  normal      Thought Process/Content: Logical      Affective Functioning: Flat      Mood: normal      Level of consciousness:  Alert and Oriented x4      Response to Learning: Able to verbalize current knowledge/experience      Endings: None Reported    Modes of Intervention: Media      Discipline Responsible: Licensed Practical Nurse      Signature:  Serena Saavedra LPN

## 2019-05-25 LAB
ESTIMATED AVERAGE GLUCOSE: 103 MG/DL
HBA1C MFR BLD: 5.2 % (ref 4–6)

## 2019-05-25 PROCEDURE — 99232 SBSQ HOSP IP/OBS MODERATE 35: CPT | Performed by: NURSE PRACTITIONER

## 2019-05-25 PROCEDURE — 6370000000 HC RX 637 (ALT 250 FOR IP): Performed by: NURSE PRACTITIONER

## 2019-05-25 PROCEDURE — 1240000000 HC EMOTIONAL WELLNESS R&B

## 2019-05-25 RX ORDER — LORAZEPAM 0.5 MG/1
0.5 TABLET ORAL ONCE
Status: COMPLETED | OUTPATIENT
Start: 2019-05-25 | End: 2019-05-25

## 2019-05-25 RX ORDER — HYDROXYZINE 50 MG/1
50 TABLET, FILM COATED ORAL 3 TIMES DAILY PRN
Status: DISCONTINUED | OUTPATIENT
Start: 2019-05-25 | End: 2019-06-06 | Stop reason: HOSPADM

## 2019-05-25 RX ADMIN — SERTRALINE HYDROCHLORIDE 150 MG: 100 TABLET ORAL at 08:34

## 2019-05-25 RX ADMIN — LORAZEPAM 0.5 MG: 0.5 TABLET ORAL at 14:41

## 2019-05-25 RX ADMIN — BUSPIRONE HYDROCHLORIDE 10 MG: 10 TABLET ORAL at 20:26

## 2019-05-25 RX ADMIN — BUSPIRONE HYDROCHLORIDE 10 MG: 10 TABLET ORAL at 08:34

## 2019-05-25 RX ADMIN — ROPINIROLE HYDROCHLORIDE 2 MG: 2 TABLET, FILM COATED ORAL at 20:26

## 2019-05-25 RX ADMIN — HYDROXYZINE HYDROCHLORIDE 25 MG: 25 TABLET, FILM COATED ORAL at 08:37

## 2019-05-25 RX ADMIN — HYDROXYZINE HYDROCHLORIDE 50 MG: 50 TABLET, FILM COATED ORAL at 20:26

## 2019-05-25 RX ADMIN — BUPROPION HYDROCHLORIDE 100 MG: 100 TABLET, FILM COATED ORAL at 08:34

## 2019-05-25 RX ADMIN — QUETIAPINE FUMARATE 200 MG: 200 TABLET ORAL at 20:26

## 2019-05-25 RX ADMIN — TRAZODONE HYDROCHLORIDE 50 MG: 50 TABLET ORAL at 20:26

## 2019-05-25 RX ADMIN — HYDROXYZINE HYDROCHLORIDE 25 MG: 25 TABLET, FILM COATED ORAL at 14:09

## 2019-05-25 RX ADMIN — BUSPIRONE HYDROCHLORIDE 10 MG: 10 TABLET ORAL at 13:03

## 2019-05-25 ASSESSMENT — PAIN DESCRIPTION - PAIN TYPE: TYPE: ACUTE PAIN

## 2019-05-25 ASSESSMENT — PAIN SCALES - GENERAL: PAINLEVEL_OUTOF10: 7

## 2019-05-25 ASSESSMENT — PAIN DESCRIPTION - LOCATION: LOCATION: ARM

## 2019-05-25 NOTE — PLAN OF CARE
Problem: Pain:  Goal: Pain level will decrease  Description  Pain level will decrease  5/24/2019 2155 by Bam Oconnell  Outcome: Ongoing     Problem: Altered Mood, Depressive Behavior:  Goal: Able to verbalize acceptance of life and situations over which he or she has no control  Description  Able to verbalize acceptance of life and situations over which he or she has no control  5/24/2019 2155 by Bam Oconnell  Outcome: Ongoing     Problem: Altered Mood, Depressive Behavior:  Goal: Ability to disclose and discuss suicidal ideas will improve  Description  Ability to disclose and discuss suicidal ideas will improve  5/24/2019 2155 by Bam Oconnell  Outcome: Ongoing   Patient denies hi and vh. Patient states \"Still feeling suicidal but it's getting better\". Patient states \"Jennings my name being called this morning but haven't heard anything since\". Patient is verbalizing having feelings of depression. Patients pain level is under control. Patient was compliant with medication. Patient participated in evening group. Q15 min checks maintained.

## 2019-05-25 NOTE — GROUP NOTE
Group Therapy Note    Date: May 24    Group Start Time: 0830  Group End Time: 0900  Group Topic: Wrap-Up    [facility]    Laury Rosenberg LPN    Patient actively participated in 2030 Wrap-up group therapy.      Group Therapy Note    Attendees: 12             Signature:  Laury Rosenberg LPN

## 2019-05-25 NOTE — GROUP NOTE
Group Therapy Note    Date: May 25    Group Start Time: 0900  Group End Time: 0930  Group Topic: [address] [facility]        Group Therapy Note    Pt did not attend community meeting d/t resting in room despite staff invitation to attend. 1:1 talk time offered as alternative to group session, pt declined.

## 2019-05-25 NOTE — GROUP NOTE
Group Therapy Note    Date: May 25    Group Start Time: 1600  Group End Time: 1630  Group Topic: Healthy Living/Wellness    [address] [place] [address]        Group Therapy Note    Attendees: 11/20         Patient's Goal:  Communication Skills    Notes:  Pt was appropriate and engaged    Status After Intervention:  Improved    Participation Level:  Active Listener and Interactive    Participation Quality: Appropriate, Attentive, Sharing and Supportive      Speech:  normal      Thought Process/Content: Logical  Linear      Affective Functioning: Congruent      Mood: Appropriate      Level of consciousness:  Alert and Oriented x4      Response to Learning: Capable of insight and Progressing to goal      Endings: None Reported    Modes of Intervention: Support, Socialization, Exploration and Activity      Discipline Responsible: Behavorial Health Tech      Signature:  William Mcclellan

## 2019-05-25 NOTE — GROUP NOTE
Group Therapy Note    Date: May 25    Group Start Time: 1330  Group End Time: 0748  Group Topic: Cognitive Skills    RIMMA Desir, [facility]S        Group Therapy Note    Attendees: 11         Patient's Goal:   Patient will demonstrate increased interpersonal interaction     Notes:  Patient attended group and participated fully. Status After Intervention:  Improved    Participation Level:  Active Listener and Interactive    Participation Quality: Appropriate, Attentive and Sharing      Speech:  normal      Thought Process/Content: Logical      Affective Functioning: Congruent      Mood: euthymic      Level of consciousness:  Alert and Oriented x4      Response to Learning: Progressing to goal      Endings: None Reported    Modes of Intervention: Socialization, Problem-solving and Activity      Discipline Responsible: Psychoeducational Specialist      Signature:  Ministerio Cazares

## 2019-05-25 NOTE — GROUP NOTE
Group Therapy Note    Date: May 25    Group Start Time: 1000  Group End Time: 1037  Group Topic: Psychoeducation    [facility]    NELY Ye LSW        Group Therapy Note    patient refused to attend psychoeducational group at 10am after encouragement from staff. 1:1 talk time offered; but declined.         Signature:  NELY Ye LSW

## 2019-05-25 NOTE — PROGRESS NOTES
Department of Psychiatry  Attending Progress Note    Chief Complaint: Major depressive disorder, recurrent ([address]     SUBJECTIVE:  Patient is seen today in the day area. She says she continues to feel both depressed and anxious. Also describes racing thoughts. She reports \"suicidal nightmares\" and brief thoughts of suicide in the morning, although these do not persist through the day. Does not have a current plan for suicide. Has not had any recurrence of nausea / vomiting since yesterday morning. She denies side effects to the medications. Explored her  feelings and concerns. Reassurance and support provided. Charting and medications reviewed. There is no identifiable safe alternative other than continued hospitalization. The patient approached medical staff later in the day and said that her anxiety level was not manageable. She had taken the Atarax and felt that it was not helping. She had been talking about the recent suicide of an acquaintance, and this seemed to have triggered the anxiety. Appeared visibly distressed.       OBJECTIVE    Physical  /83   Pulse 73   Temp 98.2 °F (36.8 °C) (Oral)   Resp 14   Ht 5' 2\" (1.575 m)   Wt 206 lb (93.4 kg)   LMP  (Exact Date)   SpO2 100%   BMI 37.68 kg/m²      Mental Status Evaluation:  Orientation: alertness: alert   Mood:. anxious and depressed      Affect:  Blunted during initial interview; later, acutely anxious/distressed      Appearance:  casually dressed   Activity:  Restless & fidgety   Gait/Posture: Normal   Speech:  normal pitch and normal volume   Thought Process:  circumstantial   Thought Content:  Some suicidal thoughts   Sensorium:  person, place and time/date   Cognition:  grossly intact   Memory: intact   Insight:  fair   Judgment: fair   Suicidal Ideations: active   Homicidal Ideations: Negative for homicidal ideation      Medication Side Effects: absent       Attention Span attention span appeared shorter than expected for age Medications  Current Facility-Administered Medications   Medication Dose Route Frequency Provider Last Rate Last Dose    hydrOXYzine (ATARAX) tablet 50 mg  50 mg Oral TID PRN TERRI Ray - ION        ondansetron (ZOFRAN-ODT) disintegrating tablet 4 mg  4 mg Oral Q8H PRN Keira Price, APRN - CNP        buPROPion [facility]) tablet 100 mg  100 mg Oral Daily TERRI Goldberg - CNP   100 mg at 05/25/19 3076    sertraline (ZOLOFT) tablet 150 mg  150 mg Oral Daily TERRI Goldberg - CNP   150 mg at 05/25/19 4578    busPIRone (BUSPAR) tablet 10 mg  10 mg Oral TID TERRI Goldberg - CNP   10 mg at 05/25/19 1303    QUEtiapine (SEROQUEL) tablet 200 mg  200 mg Oral Nightly TERRI Goldberg - CNP   200 mg at 05/24/19 2033    rOPINIRole (REQUIP) tablet 2 mg  2 mg Oral Nightly TERRI Goldberg - CNP   2 mg at 05/24/19 2034    benztropine mesylate (COGENTIN) injection 2 mg  2 mg Intramuscular BID PRN Keira Price, APRN - CNP        magnesium hydroxide (MILK OF MAGNESIA) 400 MG/5ML suspension 30 mL  30 mL Oral Daily PRN Keira Price, APRN - CNP        aluminum & magnesium hydroxide-simethicone (MAALOX) 200-200-20 MG/5ML suspension 30 mL  30 mL Oral Q6H PRN TERRI Ray - CNP   30 mL at 05/24/19 1003    traZODone (DESYREL) tablet 50 mg  50 mg Oral Nightly PRN eKira Price, APRN - CNP   50 mg at 05/24/19 2033         buPROPion  100 mg Oral Daily    sertraline  150 mg Oral Daily    busPIRone  10 mg Oral TID    QUEtiapine  200 mg Oral Nightly    rOPINIRole  2 mg Oral Nightly       ASSESSMENT  Major depressive disorder, recurrent ([facility] [address]     Patient's Response to Treatment: positive    PLAN    · Supportive therapy with medication management. Reviewed risks and benefits as well as potential side effects with patient. · Buspar increased to 10 mg TID, Seroquel increased to 200 mg HS on 5/23/2019. Started Requip 2 mg HS 5/23/2019.   · New order for Zofran 4 mg Q8H PRN for nausea and vomiting. · One-time dose of Ativan 0.5 mg was given for acute anxiety. Also increased Atarax to 50 mg TID PRN for anxiety. · Titrate medications to effect. · Therapeutic activities and groups  · Follow up at [facility] [place] after symptoms stabilized  · Discharge planning with social work.        Electronically signed by TERRI Murphy CNP on 5/25/2019 at 3:35 PM.

## 2019-05-25 NOTE — PLAN OF CARE
Patient denies SI/HI and rates depression and anxiety an '8' on a scale of 0-10. She reports hearing a voice calling her name. She is eating, drinking, and takes her medications. Patient reports minimal sleep and is isolative at times to her room. She is flat and sad. Patient is independent of ileostomy care and reports changing the bag 2 days ago. Patient denies pain. Safety checks every 15 min; patient safety maintained.

## 2019-05-26 PROCEDURE — 6370000000 HC RX 637 (ALT 250 FOR IP): Performed by: NURSE PRACTITIONER

## 2019-05-26 PROCEDURE — 99232 SBSQ HOSP IP/OBS MODERATE 35: CPT | Performed by: NURSE PRACTITIONER

## 2019-05-26 PROCEDURE — 1240000000 HC EMOTIONAL WELLNESS R&B

## 2019-05-26 RX ADMIN — BUSPIRONE HYDROCHLORIDE 10 MG: 10 TABLET ORAL at 13:15

## 2019-05-26 RX ADMIN — HYDROXYZINE HYDROCHLORIDE 50 MG: 50 TABLET, FILM COATED ORAL at 20:31

## 2019-05-26 RX ADMIN — BUPROPION HYDROCHLORIDE 100 MG: 100 TABLET, FILM COATED ORAL at 08:27

## 2019-05-26 RX ADMIN — HYDROXYZINE HYDROCHLORIDE 50 MG: 50 TABLET, FILM COATED ORAL at 13:17

## 2019-05-26 RX ADMIN — QUETIAPINE FUMARATE 200 MG: 200 TABLET ORAL at 20:31

## 2019-05-26 RX ADMIN — TRAZODONE HYDROCHLORIDE 50 MG: 50 TABLET ORAL at 20:31

## 2019-05-26 RX ADMIN — SERTRALINE HYDROCHLORIDE 150 MG: 100 TABLET ORAL at 08:27

## 2019-05-26 RX ADMIN — BUSPIRONE HYDROCHLORIDE 10 MG: 10 TABLET ORAL at 08:27

## 2019-05-26 RX ADMIN — ROPINIROLE HYDROCHLORIDE 2 MG: 2 TABLET, FILM COATED ORAL at 20:31

## 2019-05-26 RX ADMIN — BUSPIRONE HYDROCHLORIDE 10 MG: 10 TABLET ORAL at 20:31

## 2019-05-26 NOTE — PLAN OF CARE
PSYCHOEDUCATION GROUP NOTE  Date: Start Time: 1330 End Time: 1400  Number Participants in Group: 9/24  Goal: Patient will demonstrate increased relaxation, increased knowledge of stretching/exercising, and decreased depression/anxiety. Topic: Coping by Stretching/Exercising  Method: Exercise, Education, and Exercising  Discipline Responsible:   OT  AT  [facility]. X RT [facility] Other   ? Participation Level:    None  Minimal   X Active Listener X Interactive    Monopolizing     ? Participation Quality:  X Appropriate ? Inappropriate   X Attentive ? Intrusive   X Sharing ? Resistant   X Supportive ? Lethargic   ? Affective:   ? Congruent ? Incongruent ? Blunted ? Flat   ? Constricted ? Anxious ? Elated ? Angry   ? Labile ? Depressed ? Other x appropriate   ? Cognitive:  X Alert X Oriented PPTP     Concentration X G ? F ? P   Attention Span X G  F  P   Short-Term Memory ? G ? F ? P   Long-Term Memory ? G ? F ? P   ProblemSolving/  Decision Making X G ? F ? P   Ability to Process  Information X G ? F ? P     ? Contributing Factors   ? Delusional   ? Hallucinating   ? Flight of Ideas   ? Other:   ? Modes of Intervention:  x Education x Support x Exploration   ? Clarifying ? Problem Solving x Confrontation   x Socialization ? Limit Setting ? Reality Testing   x Activity x Movement ? Media   ? Other:  ? ? ? ?   ? Response to Learning:  X Able to verbalize current knowledge/experience   X Able to verbalize/acknowledge new learning   X Able to retain information   X Capable of insight   ? Able to change behavior   X Progressing to goal   ? Other:    ?  Comments: Beverly was an active participant in group. Beverly adapted the exercises and stretches to perform them in a chair.

## 2019-05-26 NOTE — PLAN OF CARE
Patient denies suicidal ideations and denies hearing voices today. When writer asked if patient about depression, patient stated, \"I just feel blah. \" Patient rates anxiety a '5' on a scale of 0-10. Patient is medication compliant and attends groups. Reports eating, drinking, and sleeping adequately. Safety checks every 15 min; patient safety maintained.

## 2019-05-26 NOTE — PROGRESS NOTES
Department of Psychiatry  Attending Progress Note    Chief Complaint: Major depressive disorder, recurrent ([address]     SUBJECTIVE:  Patient is seen today in her room. She says her anxiety is \"a little better,\" but her depression is \"about the same\" as compared to yesterday. She slept better last night than previously. She continues to have nightmares about suicide, but denies persistent thoughts or plans for suicide. She says that, in the past, her anxiety would usually improve before her mood, and she seems hopeful that this will happen again this time. She denies side effects to her medications. She reported that the one dose of Ativan that was given yesterday afternoon was effective. Explored her  feelings and concerns. Reassurance and support provided. Charting and medications reviewed. There is no identifiable safe alternative other than continued hospitalization.        OBJECTIVE    Physical  /74   Pulse 76   Temp 97.7 °F (36.5 °C) (Oral)   Resp 14   Ht 5' 2\" (1.575 m)   Wt 206 lb (93.4 kg)   LMP  (Exact Date)   SpO2 100%   BMI 37.68 kg/m²      Mental Status Evaluation:  Orientation: alertness: alert   Mood:. anxious and depressed      Affect:  Blunted, but a little more reactive than yesterday      Appearance:  casually dressed   Activity:  No psychomotor agitation observed   Gait/Posture: Normal   Speech:  normal pitch and normal volume   Thought Process:  circumstantial   Thought Content:  Fleeting suicidal thoughts   Sensorium:  person, place and time/date   Cognition:  grossly intact   Memory: intact   Insight:  fair   Judgment: fair   Suicidal Ideations: Briefly present without plan or intent   Homicidal Ideations: Negative for homicidal ideation      Medication Side Effects: absent       Attention Span attention span appeared shorter than expected for age     Medications  Current Facility-Administered Medications   Medication Dose Route Frequency Provider Last Rate Last Dose    hydrOXYzine (ATARAX) tablet 50 mg  50 mg Oral TID PRN Yasmin Howard, APRN - CNP   50 mg at 05/25/19 2026    ondansetron (ZOFRAN-ODT) disintegrating tablet 4 mg  4 mg Oral Q8H PRN Yasmin Howard, APRN - CNP        buPROPion [facility]) tablet 100 mg  100 mg Oral Daily Squire Perches, APRN - CNP   100 mg at 05/26/19 0827    sertraline (ZOLOFT) tablet 150 mg  150 mg Oral Daily Squire Perches, APRN - CNP   150 mg at 05/26/19 0827    busPIRone (BUSPAR) tablet 10 mg  10 mg Oral TID Squire Perches, APRN - CNP   10 mg at 05/26/19 0827    QUEtiapine (SEROQUEL) tablet 200 mg  200 mg Oral Nightly Squire Perches, APRN - CNP   200 mg at 05/25/19 2026    rOPINIRole (REQUIP) tablet 2 mg  2 mg Oral Nightly Squire Perches, APRN - CNP   2 mg at 05/25/19 2026    benztropine mesylate (COGENTIN) injection 2 mg  2 mg Intramuscular BID PRN Yasmin Howard, APRN - CNP        magnesium hydroxide (MILK OF MAGNESIA) 400 MG/5ML suspension 30 mL  30 mL Oral Daily PRN Yasmin Howard, APRN - CNP        aluminum & magnesium hydroxide-simethicone (MAALOX) 200-200-20 MG/5ML suspension 30 mL  30 mL Oral Q6H PRN Yasmin Howard, APRN - CNP   30 mL at 05/24/19 1003    traZODone (DESYREL) tablet 50 mg  50 mg Oral Nightly PRN Yasmin Medtyrese, APRN - CNP   50 mg at 05/25/19 2026         buPROPion  100 mg Oral Daily    sertraline  150 mg Oral Daily    busPIRone  10 mg Oral TID    QUEtiapine  200 mg Oral Nightly    rOPINIRole  2 mg Oral Nightly       ASSESSMENT  Major depressive disorder, recurrent ([facility]     Patient's Response to Treatment: positive    PLAN    · Supportive therapy with medication management. Reviewed risks and benefits as well as potential side effects with patient. · Buspar increased to 10 mg TID, Seroquel increased to 200 mg HS on 5/23/2019. Started Requip 2 mg HS 5/23/2019. Atarax increased to 50 mg TID PRN 5/25/19. · Zofran 4 mg Q8H PRN for nausea and vomiting. · Titrate medications to effect. · Therapeutic activities and groups  · Follow up at [facility] after symptoms stabilized  · Discharge planning with social work.        Electronically signed by TERRI Holt CNP on 5/26/2019 at 10:52 AM.

## 2019-05-27 PROCEDURE — 6370000000 HC RX 637 (ALT 250 FOR IP): Performed by: NURSE PRACTITIONER

## 2019-05-27 PROCEDURE — 1240000000 HC EMOTIONAL WELLNESS R&B

## 2019-05-27 PROCEDURE — 90833 PSYTX W PT W E/M 30 MIN: CPT | Performed by: NURSE PRACTITIONER

## 2019-05-27 PROCEDURE — 99232 SBSQ HOSP IP/OBS MODERATE 35: CPT | Performed by: NURSE PRACTITIONER

## 2019-05-27 RX ORDER — PRAZOSIN HYDROCHLORIDE 1 MG/1
2 CAPSULE ORAL NIGHTLY
Status: DISCONTINUED | OUTPATIENT
Start: 2019-05-27 | End: 2019-06-06 | Stop reason: HOSPADM

## 2019-05-27 RX ADMIN — SERTRALINE HYDROCHLORIDE 150 MG: 100 TABLET ORAL at 08:25

## 2019-05-27 RX ADMIN — HYDROXYZINE HYDROCHLORIDE 50 MG: 50 TABLET, FILM COATED ORAL at 21:13

## 2019-05-27 RX ADMIN — BUSPIRONE HYDROCHLORIDE 10 MG: 10 TABLET ORAL at 21:12

## 2019-05-27 RX ADMIN — BUPROPION HYDROCHLORIDE 100 MG: 100 TABLET, FILM COATED ORAL at 08:26

## 2019-05-27 RX ADMIN — HYDROXYZINE HYDROCHLORIDE 50 MG: 50 TABLET, FILM COATED ORAL at 08:25

## 2019-05-27 RX ADMIN — ALUMINUM HYDROXIDE, MAGNESIUM HYDROXIDE, AND SIMETHICONE 30 ML: 200; 200; 20 SUSPENSION ORAL at 21:14

## 2019-05-27 RX ADMIN — QUETIAPINE FUMARATE 200 MG: 200 TABLET ORAL at 21:13

## 2019-05-27 RX ADMIN — ROPINIROLE HYDROCHLORIDE 2 MG: 2 TABLET, FILM COATED ORAL at 21:12

## 2019-05-27 RX ADMIN — BUSPIRONE HYDROCHLORIDE 10 MG: 10 TABLET ORAL at 08:25

## 2019-05-27 RX ADMIN — TRAZODONE HYDROCHLORIDE 50 MG: 50 TABLET ORAL at 21:13

## 2019-05-27 RX ADMIN — BUSPIRONE HYDROCHLORIDE 10 MG: 10 TABLET ORAL at 13:15

## 2019-05-27 RX ADMIN — PRAZOSIN HYDROCHLORIDE 2 MG: 1 CAPSULE ORAL at 21:12

## 2019-05-27 NOTE — GROUP NOTE
Group Therapy Note    Date: May 27    Group Start Time: 1027  Group End Time: 8853  Group Topic: [address] [place], [place]    Patient refused to attend [employer]  group at 6184 after encouragement from staff. 1:1 talk time offered as alternative to group session but pt declined.      Signature:  Mariya Blackman

## 2019-05-27 NOTE — PLAN OF CARE
Problem: Altered Mood, Depressive Behavior:  Goal: Able to verbalize acceptance of life and situations over which he or she has no control  Description  Able to verbalize acceptance of life and situations over which he or she has no control  5/26/2019 2253 by Franchesca Hood RN  Outcome: Ongoing  Note:   Patient denies suicidal ideations. Patient denies auditory hallucinations. Patient reports that she is \"feeling better\". Patient reports her only complaint is the nightmares she is having. Patient is hopeful that the medication changes that were made today will help. Patient social with peers watching television throughout the evening. Patient is compliant with medications. Patient attends group. Patient safety maintained q15 minute checks. 5/26/2019 0941 by Matias Jiménez RN  Outcome: Ongoing     Problem: Pain:  Goal: Pain level will decrease  Description  Pain level will decrease  5/26/2019 2253 by Franchesca Hood RN  Outcome: Ongoing  Note:   Patient denies experiencing any pain at this time. Encouraged patient to notify staff of any changes. Will continue to monitor.    5/26/2019 0941 by Matias Jiménez RN  Outcome: Ongoing

## 2019-05-27 NOTE — GROUP NOTE
Group Therapy Note    Date: May 27    Group Start Time: 1600  Group End Time: 0672  Group Topic: Healthy Living/Wellness    RIMMA Green        Group Therapy Note    Attendees:13/22         Patient's Goal:  Share positive choices with group    Notes:      Status After Intervention:  Improved    Participation Level: Active Listener and Interactive    Participation Quality: Appropriate      Speech:  normal      Thought Process/Content: Logical      Affective Functioning: Congruent      Mood: controlled      Level of consciousness:  Alert, Oriented x4 and Attentive      Response to Learning: Able to verbalize current knowledge/experience and Progressing to goal      Endings: None Reported    Modes of Intervention: Education and Support      Discipline Responsible: Behavorial Health Tech      Signature:   Sherice Carrasquillo

## 2019-05-27 NOTE — GROUP NOTE
Group Therapy Note    Date: May 27    Group Start Time: 1330  Group End Time: 1415  Group Topic: Recreational    [facility]    [place], [place]    Attendees: 11         Patient's Goal:  To demonstrate increased interpersonal skills. Notes:  Patient attended group and participated in task at hand. Patient needed frequent redirection / limit setting due to continuously interrupting group and talking over others. Patient responded minimally to redirection given.     Status After Intervention:  Improved    Participation Level: Interactive    Participation Quality: Sharing and Intrusive      Speech:  loud      Thought Process/Content: Logical  Flight of ideas      Affective Functioning: Congruent      Level of consciousness:  Alert and Inattentive      Response to Learning: Able to verbalize current knowledge/experience, Able to verbalize/acknowledge new learning, Able to retain information, Capable of insight and Progressing to goal      Endings: None Reported       Modes of Intervention: Support, Socialization, Exploration, Problem-solving, Activity and Reality-testing      Discipline Responsible: Psychoeducational Specialist      Signature:  Peyton Muro

## 2019-05-27 NOTE — PROGRESS NOTES
Department of Psychiatry  Attending Progress Note    Chief Complaint: Major depressive disorder, recurrent ([address]     SUBJECTIVE:  Patient is seen today in the day room. She continues to have nightmares about suicide, but denies active thoughts or plans for suicide. She states that the nightmares are affecting her sleep significantly. Provider will prescribe Prazosin to assist. Today she is reporting that her depression is worse than yesterday, that while she does not have suicidal thoughts currently, she experiences desperate thoughts in the morning. She endorses voices of her name being called, anxiety that is higher than yesterday but decreased from admission denying HI. She has been attending groups and is medication compliant. Chart and medications reviewed. Therapeutic support, empathetic care and psycho education provided greater than 20 minutes. At this time there is no safe alternative other than inpatient care.     OBJECTIVE    Physical  /71   Pulse 63   Temp 97.8 °F (36.6 °C) (Oral)   Resp 14   Ht 5' 2\" (1.575 m)   Wt 206 lb (93.4 kg)   LMP  (Exact Date)   SpO2 100%   BMI 37.68 kg/m²      Mental Status Evaluation:  Orientation: alertness: alert   Mood:. anxious and depressed      Affect:  Blunted, but a little more reactive than yesterday      Appearance:  casually dressed   Activity:  No psychomotor agitation observed   Gait/Posture: Normal   Speech:  normal pitch and normal volume   Thought Process:  circumstantial   Thought Content:  Fleeting suicidal thoughts   Sensorium:  person, place and time/date   Cognition:  grossly intact   Memory: intact   Insight:  fair   Judgment: fair   Suicidal Ideations: Briefly present without plan or intent   Homicidal Ideations: Negative for homicidal ideation      Medication Side Effects: absent       Attention Span attention span appeared shorter than expected for age     Medications  Current Facility-Administered Medications   Medication Dose Route Frequency Provider Last Rate Last Dose    prazosin (MINIPRESS) capsule 2 mg  2 mg Oral Nightly Sancho Lozano, APRN - CNP        hydrOXYzine (ATARAX) tablet 50 mg  50 mg Oral TID PRN Janice Furl, APRN - CNP   50 mg at 05/27/19 0825    ondansetron (ZOFRAN-ODT) disintegrating tablet 4 mg  4 mg Oral Q8H PRN Janice Furl, APRN - CNP        buPROPion [facility]) tablet 100 mg  100 mg Oral Daily Sancho Lozano, APRN - CNP   100 mg at 05/27/19 9212    sertraline (ZOLOFT) tablet 150 mg  150 mg Oral Daily Sancho Lozano, APRN - CNP   150 mg at 05/27/19 0825    busPIRone (BUSPAR) tablet 10 mg  10 mg Oral TID Sancho Lozano, APRN - CNP   10 mg at 05/27/19 1315    QUEtiapine (SEROQUEL) tablet 200 mg  200 mg Oral Nightly Sancho Lozano, APRN - CNP   200 mg at 05/26/19 2031    rOPINIRole (REQUIP) tablet 2 mg  2 mg Oral Nightly Sancho Lozano, APRN - CNP   2 mg at 05/26/19 2031    benztropine mesylate (COGENTIN) injection 2 mg  2 mg Intramuscular BID PRN Janice Furl, APRN - CNP        magnesium hydroxide (MILK OF MAGNESIA) 400 MG/5ML suspension 30 mL  30 mL Oral Daily PRN Janice Furl, APRN - CNP        aluminum & magnesium hydroxide-simethicone (MAALOX) 200-200-20 MG/5ML suspension 30 mL  30 mL Oral Q6H PRN Janice Furl, APRN - CNP   30 mL at 05/24/19 1003    traZODone (DESYREL) tablet 50 mg  50 mg Oral Nightly PRN Janice Furl, APRN - CNP   50 mg at 05/26/19 2031         prazosin  2 mg Oral Nightly    buPROPion  100 mg Oral Daily    sertraline  150 mg Oral Daily    busPIRone  10 mg Oral TID    QUEtiapine  200 mg Oral Nightly    rOPINIRole  2 mg Oral Nightly       ASSESSMENT  Major depressive disorder, recurrent ([facility]     Patient's Response to Treatment: positive    PLAN    · Supportive therapy with medication management. Reviewed risks and benefits as well as potential side effects with patient.   · Buspar increased to 10 mg TID, Seroquel increased to 200 mg HS

## 2019-05-27 NOTE — PLAN OF CARE
Patient denies suicidal ideations but rates depression an '8' and anxiety a '7'. Patient reports poor sleep due to nightmares of someone killing her but she stated, \"I think that it's better because I wasn't killing myself in my dream.\" Patient is medication compliant and attends select groups. Patient denies pain. Safety checks every 15 min; patient safety maintained.

## 2019-05-28 PROBLEM — F32.3 SEVERE MAJOR DEPRESSION WITH PSYCHOTIC FEATURES (HCC): Status: ACTIVE | Noted: 2019-04-02

## 2019-05-28 PROCEDURE — 1240000000 HC EMOTIONAL WELLNESS R&B

## 2019-05-28 PROCEDURE — 6370000000 HC RX 637 (ALT 250 FOR IP): Performed by: NURSE PRACTITIONER

## 2019-05-28 PROCEDURE — 90833 PSYTX W PT W E/M 30 MIN: CPT | Performed by: REGISTERED NURSE

## 2019-05-28 PROCEDURE — 99232 SBSQ HOSP IP/OBS MODERATE 35: CPT | Performed by: REGISTERED NURSE

## 2019-05-28 RX ADMIN — HYDROXYZINE HYDROCHLORIDE 50 MG: 50 TABLET, FILM COATED ORAL at 21:26

## 2019-05-28 RX ADMIN — HYDROXYZINE HYDROCHLORIDE 50 MG: 50 TABLET, FILM COATED ORAL at 14:12

## 2019-05-28 RX ADMIN — TRAZODONE HYDROCHLORIDE 50 MG: 50 TABLET ORAL at 21:26

## 2019-05-28 RX ADMIN — PRAZOSIN HYDROCHLORIDE 2 MG: 1 CAPSULE ORAL at 21:27

## 2019-05-28 RX ADMIN — BUSPIRONE HYDROCHLORIDE 10 MG: 10 TABLET ORAL at 14:12

## 2019-05-28 RX ADMIN — BUPROPION HYDROCHLORIDE 100 MG: 100 TABLET, FILM COATED ORAL at 08:28

## 2019-05-28 RX ADMIN — BUSPIRONE HYDROCHLORIDE 10 MG: 10 TABLET ORAL at 08:28

## 2019-05-28 RX ADMIN — SERTRALINE HYDROCHLORIDE 150 MG: 100 TABLET ORAL at 08:28

## 2019-05-28 RX ADMIN — ROPINIROLE HYDROCHLORIDE 2 MG: 2 TABLET, FILM COATED ORAL at 21:27

## 2019-05-28 RX ADMIN — QUETIAPINE FUMARATE 200 MG: 200 TABLET ORAL at 21:27

## 2019-05-28 RX ADMIN — BUSPIRONE HYDROCHLORIDE 10 MG: 10 TABLET ORAL at 21:26

## 2019-05-28 ASSESSMENT — PAIN SCALES - GENERAL
PAINLEVEL_OUTOF10: 0
PAINLEVEL_OUTOF10: 0

## 2019-05-28 NOTE — GROUP NOTE
Group Therapy Note    Date: May 28    Group Start Time: 1610  Group End Time: 1630  Group Topic: Healthy Living/Wellness    RIMMA SYVLESTER    Alicia Polanco RN        Group Therapy Note    Attendees: 14/18         Patient's Goal:  To be ready for discharge    Notes:  Patient was an active listener and asked questions. Status After Intervention:  Unchanged    Participation Level:  Active Listener and Interactive    Participation Quality: Appropriate, Attentive, Sharing and Supportive      Speech:  normal      Thought Process/Content: Logical      Affective Functioning: Congruent      Mood: anxious and depressed      Level of consciousness:  Alert, Oriented x4 and Attentive      Response to Learning: Able to verbalize current knowledge/experience, Able to verbalize/acknowledge new learning, Able to retain information, Capable of insight, Able to change behavior and Progressing to goal      Endings: None Reported    Modes of Intervention: Education, Support, Socialization, Exploration, Clarifying and Limit-setting      Discipline Responsible: Registered Nurse      Signature:  Alicia Polanco RN

## 2019-05-28 NOTE — GROUP NOTE
Group Therapy Note    Date: May 28    Group Start Time: 1100  Group End Time: 1253  Group Topic: Psychotherapy    [facility] MSW, LSW        Group Therapy Note    Attendees: 6/11         Pt declined to attend psychotherapy at 1100 am despite encouragement. Pt offered 1:1 and refused.

## 2019-05-28 NOTE — GROUP NOTE
Group Therapy Note    Date: May 28    Group Start Time: 1000  Group End Time: 8308  Group Topic: Recreational    [address], [address]    Patient refused to attend Recreational Therapy Group at 1000 after encouragement from staff. 1:1 talk time offered but patient refused.      Signature:  Veronica Monae

## 2019-05-28 NOTE — PLAN OF CARE
Problem: Altered Mood, Depressive Behavior:  Goal: Able to verbalize acceptance of life and situations over which he or she has no control  Description  Able to verbalize acceptance of life and situations over which he or she has no control  5/27/2019 2140 by Jarett Bla RN  Outcome: Ongoing  Note:   Pt denies SI and states she is feeling better this evening. Pt states she is nervous about taking new medication to help with nightmares. Pt states she is having really vivid nightmares just before she wakes up. Medication education provided. Pt has a plan after discharge to return to a recovery house and is excited about her sobriety of 25 days. Pt states her family is really supportive and wants to establish better relationships with them. Problem: Altered Mood, Depressive Behavior:  Goal: Ability to disclose and discuss suicidal ideas will improve  Description  Ability to disclose and discuss suicidal ideas will improve  5/27/2019 2140 by Jarett Bal RN  Outcome: Ongoing  Note:   Pt denies thoughts of self harm and is agreeable to seeking out should thoughts of self harm arise. Safe environment maintained. Q15 minute checks for safety cont per unit policy. Will cont to monitor for safety and provides support and reassurance as needed.

## 2019-05-28 NOTE — PLAN OF CARE
Problem: Pain:  Goal: Pain level will decrease  Description  Pain level will decrease  Outcome: Ongoing  Note:   Patient denies being in pain at this time, however, states that it comes and goes. Utilizing pain medication PRN, after nonpharmacologic methods are tired. Problem: Altered Mood, Depressive Behavior:  Goal: Able to verbalize acceptance of life and situations over which he or she has no control  Description  Able to verbalize acceptance of life and situations over which he or she has no control  5/28/2019 0908 by Yevgeniy Roach LPN  Outcome: Ongoing  Note:   Patient is able to demonstrate that she is accepting and utilizing coping mechanisms to deal with life stressors and hospitalization. Problem: Altered Mood, Depressive Behavior:  Goal: Ability to disclose and discuss suicidal ideas will improve  Description  Ability to disclose and discuss suicidal ideas will improve  5/28/2019 0908 by Yevgeniy Roach LPN  Outcome: Ongoing  Note:   No plans for suicide, no thoughts of suicide, if the thoughts were to arise she has agreed to seek out staff assistance.

## 2019-05-28 NOTE — GROUP NOTE
Group Therapy Note    Date: May 28    Group Start Time: 0564  Group End Time: 0915  Group Topic: [address] [facility], [facility]    Patient refused to attend Goal Setting and [employer] after encouragement from staff. 1:1 talk time offered but patient refused.       Signature:  Kami Velasco

## 2019-05-28 NOTE — BH NOTE
Patient stated she did not need her ostomy bag changed today \"because there is a good seal on this one.  \"

## 2019-05-28 NOTE — PROGRESS NOTES
Department of Psychiatry  Attending Progress Note    Chief Complaint: Major depressive disorder, recurrent ([address]     SUBJECTIVE:  Ana Luisa Wei is seen in the day area. She reports she finally did not have nightmares last night with the addition of Prazosin. She states sleep is improving. Today she is reports that her depression is better than yesterday, that while she does not have suicidal thoughts currently, she continues to experience desperate thoughts in the morning. She endorses voices of her name being called, anxiety that is high decreased from admission denying HI. She has been attending groups and is medication compliant. She is attending to her ADL's. Chart and medications reviewed. Therapeutic support, empathetic care and psycho education provided greater than 20 minutes. At this time there is no safe alternative other than inpatient care.     OBJECTIVE    Physical  /64   Pulse 62   Temp 97.8 °F (36.6 °C) (Oral)   Resp 16   Ht 5' 2\" (1.575 m)   Wt 206 lb (93.4 kg)   LMP  (Exact Date)   SpO2 100%   BMI 37.68 kg/m²      Mental Status Evaluation:  Orientation: alertness: alert   Mood:. anxious and depressed but better      Affect:  Blunted, but a little more reactive than previously      Appearance:  casually dressed   Activity:  No psychomotor agitation observed   Gait/Posture: Normal   Speech:  normal pitch and normal volume   Thought Process:  circumstantial   Thought Content:  Fleeting suicidal thoughts   Sensorium:  person, place and time/date   Cognition:  grossly intact   Memory: intact   Insight:  fair   Judgment: fair   Suicidal Ideations: Briefly present without plan or intent   Homicidal Ideations: Negative for homicidal ideation      Medication Side Effects: absent       Attention Span attention span appeared shorter than expected for age     Medications  Current Facility-Administered Medications   Medication Dose Route Frequency Provider Last Rate Last Dose    prazosin (MINIPRESS) capsule 2 mg  2 mg Oral Nightly Carolynne Efren, APRN - CNP   2 mg at 05/27/19 2112    hydrOXYzine (ATARAX) tablet 50 mg  50 mg Oral TID PRN Theadore Case, APRN - CNP   50 mg at 05/28/19 1412    ondansetron (ZOFRAN-ODT) disintegrating tablet 4 mg  4 mg Oral Q8H PRN Theadore Case, APRN - CNP        buPROPion [facility]) tablet 100 mg  100 mg Oral Daily Carolynne Efren, APRN - CNP   100 mg at 05/28/19 9980    sertraline (ZOLOFT) tablet 150 mg  150 mg Oral Daily Carolynne Efren, APRN - CNP   150 mg at 05/28/19 7022    busPIRone (BUSPAR) tablet 10 mg  10 mg Oral TID Carolynne Efren, APRN - CNP   10 mg at 05/28/19 1412    QUEtiapine (SEROQUEL) tablet 200 mg  200 mg Oral Nightly Carolynne Efren, APRN - CNP   200 mg at 05/27/19 2113    rOPINIRole (REQUIP) tablet 2 mg  2 mg Oral Nightly Carolynne Efren, APRN - CNP   2 mg at 05/27/19 2112    benztropine mesylate (COGENTIN) injection 2 mg  2 mg Intramuscular BID PRN Theadore Case, APRN - CNP        magnesium hydroxide (MILK OF MAGNESIA) 400 MG/5ML suspension 30 mL  30 mL Oral Daily PRN Theadore Case, APRN - CNP        aluminum & magnesium hydroxide-simethicone (MAALOX) 200-200-20 MG/5ML suspension 30 mL  30 mL Oral Q6H PRN Theadore Case, APRN - CNP   30 mL at 05/27/19 2114    traZODone (DESYREL) tablet 50 mg  50 mg Oral Nightly PRN Theadore Case, APRN - CNP   50 mg at 05/27/19 2113         prazosin  2 mg Oral Nightly    buPROPion  100 mg Oral Daily    sertraline  150 mg Oral Daily    busPIRone  10 mg Oral TID    QUEtiapine  200 mg Oral Nightly    rOPINIRole  2 mg Oral Nightly       ASSESSMENT  Major depressive disorder, recurrent ([address]     Patient's Response to Treatment: positive    PLAN    · Supportive therapy with medication management. Reviewed risks and benefits as well as potential side effects with patient. · Buspar increased to 10 mg TID, Seroquel increased to 200 mg HS on 5/23/2019.  Started Requip 2 mg HS

## 2019-05-28 NOTE — GROUP NOTE
Group Therapy Note    Date: May 28    Group Start Time: 1330  Group End Time: 1410  Group Topic: Recreational    [facility]    [place], [place]    Attendees: 8      Patient's Goal:  To demonstrate increased interpersonal skills. Notes:  Patient attended group and actively participated in task at hand. Patient conversed appropriately with peers and Anamaria Estes. Status After Intervention:  Improved       Participation Level:  Active Listener and Interactive       Participation Quality: Appropriate, Attentive and Sharing      Speech:  normal      Thought Process/Content: Logical      Affective Functioning: Congruent      Level of consciousness:  Alert, Oriented x4 and Attentive      Response to Learning: Able to verbalize current knowledge/experience, Able to verbalize/acknowledge new learning, Able to retain information, Capable of insight and Progressing to goal      Endings: None Reported       Modes of Intervention: Socialization, Exploration, Clarifying, Problem-solving, Activity, Limit-setting and Reality-testing      Discipline Responsible: Psychoeducational Specialist      Signature:  Viral Cabrales

## 2019-05-28 NOTE — FLOWSHEET NOTE
05/28/19 1430   Encounter Summary   Services provided to: Patient   Referral/Consult From:   (Spirituality group)   Continue Visiting   (5/28/19)   Complexity of Encounter Low   Length of Encounter 30 minutes   Spiritual/Anabaptism   Type Spiritual support   Intervention   (Patient attended spirituality group led by Fr. Veena Gaffney)

## 2019-05-29 PROCEDURE — 1240000000 HC EMOTIONAL WELLNESS R&B

## 2019-05-29 PROCEDURE — 6370000000 HC RX 637 (ALT 250 FOR IP): Performed by: NURSE PRACTITIONER

## 2019-05-29 PROCEDURE — 99232 SBSQ HOSP IP/OBS MODERATE 35: CPT | Performed by: NURSE PRACTITIONER

## 2019-05-29 PROCEDURE — 99253 IP/OBS CNSLTJ NEW/EST LOW 45: CPT | Performed by: INTERNAL MEDICINE

## 2019-05-29 RX ORDER — ACETAMINOPHEN 325 MG/1
650 TABLET ORAL EVERY 4 HOURS PRN
Status: DISCONTINUED | OUTPATIENT
Start: 2019-05-29 | End: 2019-06-06 | Stop reason: HOSPADM

## 2019-05-29 RX ADMIN — BUPROPION HYDROCHLORIDE 100 MG: 100 TABLET, FILM COATED ORAL at 08:01

## 2019-05-29 RX ADMIN — HYDROXYZINE HYDROCHLORIDE 50 MG: 50 TABLET, FILM COATED ORAL at 15:15

## 2019-05-29 RX ADMIN — HYDROXYZINE HYDROCHLORIDE 50 MG: 50 TABLET, FILM COATED ORAL at 08:01

## 2019-05-29 RX ADMIN — Medication 1 MG: at 20:28

## 2019-05-29 RX ADMIN — PRAZOSIN HYDROCHLORIDE 2 MG: 1 CAPSULE ORAL at 20:28

## 2019-05-29 RX ADMIN — HYDROXYZINE HYDROCHLORIDE 50 MG: 50 TABLET, FILM COATED ORAL at 21:30

## 2019-05-29 RX ADMIN — BUSPIRONE HYDROCHLORIDE 10 MG: 10 TABLET ORAL at 13:18

## 2019-05-29 RX ADMIN — QUETIAPINE FUMARATE 200 MG: 200 TABLET ORAL at 20:29

## 2019-05-29 RX ADMIN — TRAZODONE HYDROCHLORIDE 50 MG: 50 TABLET ORAL at 20:29

## 2019-05-29 RX ADMIN — SERTRALINE HYDROCHLORIDE 150 MG: 100 TABLET ORAL at 08:01

## 2019-05-29 RX ADMIN — BUSPIRONE HYDROCHLORIDE 10 MG: 10 TABLET ORAL at 20:29

## 2019-05-29 RX ADMIN — ROPINIROLE HYDROCHLORIDE 2 MG: 2 TABLET, FILM COATED ORAL at 20:29

## 2019-05-29 RX ADMIN — BUSPIRONE HYDROCHLORIDE 10 MG: 10 TABLET ORAL at 08:01

## 2019-05-29 ASSESSMENT — PAIN DESCRIPTION - LOCATION: LOCATION: HEAD

## 2019-05-29 ASSESSMENT — PAIN SCALES - GENERAL: PAINLEVEL_OUTOF10: 0

## 2019-05-29 NOTE — GROUP NOTE
Group Therapy Note    Date: May 29    Group Start Time: 0845  Group End Time: 9239  Group Topic: Community Meeting    BRUNO NEGROPRABHJOT Blankenship     Patient's Goal:  Pt will demonstrate increased interpersonal interaction     Notes:      Status After Intervention:  Improved    Participation Level:  Active Listener and Interactive    Participation Quality: Appropriate, Attentive and Sharing      Speech:  normal      Thought Process/Content: Logical  Linear      Affective Functioning: Congruent      Mood: euthymic      Level of consciousness:  Alert, Oriented x4 and Attentive      Response to Learning: Able to verbalize current knowledge/experience, Able to verbalize/acknowledge new learning, Able to retain information and Progressing to goal      Endings: None Reported    Modes of Intervention: Education, Support, Socialization, Exploration, Clarifying and Problem-solving      Discipline Responsible: Psychoeducational Specialist      Signature:  Karly Blankenship

## 2019-05-29 NOTE — GROUP NOTE
Group Therapy Note    Date: May 29    Group Start Time: 1000  Group End Time: 6256  Group Topic: Recreational    [facility] Bower    Patient's Goal:  Participate in leisure skills group, demonstrate increased interpersonal interaction     Notes:  Pt attended group, participated and was supportive of peers. Status After Intervention:  Improved    Participation Level:  Active Listener and Interactive    Participation Quality: Appropriate, Attentive, Sharing and Supportive    Speech:  normal    Thought Process/Content: Logical    Affective Functioning: Congruent    Level of consciousness:  Alert, Oriented x4 and Attentive    Response to Learning: Able to verbalize current knowledge/experience, Capable of insight and Progressing to goal    Endings: None Reported    Modes of Intervention: Education, Socialization, Exploration, Problem-solving, Activity and Limit-setting    Discipline Responsible: Psychoeducational Specialist    Signature:  Edward Chen

## 2019-05-29 NOTE — GROUP NOTE
Group Therapy Note    Date: May 29    Group Start Time: 1330  Group End Time: 1420  Group Topic: Cognitive Skills    [facility] Zhu    Patient's Goal:  Improve cognitive skills, interact with staff and peers, demonstrate increased interpersonal interaction     Notes:  Pt participated in group and was supportive of peers. Status After Intervention:  Improved    Participation Level:  Active Listener and Interactive    Participation Quality: Appropriate, Attentive, Sharing and Supportive    Speech:  normal    Thought Process/Content: Logical    Affective Functioning: Congruent    Level of consciousness:  Alert, Oriented x4 and Attentive    Response to Learning: Able to verbalize current knowledge/experience, Capable of insight and Progressing to goal    Endings: None Reported    Modes of Intervention: Education, Support, Socialization, Exploration, Problem-solving and Activity    Discipline Responsible: Psychoeducational Specialist    Signature:  Elvis Mercedes, [address]

## 2019-05-29 NOTE — CONSULTS
[place] [address] [facility]    CONSULTATION  / FOLLOW UP VISIT       Date:   2019  Patient name:  Beulah Mullins  Date of admission:  2019  1:26 PM  MRN:   [id number]  Account:  [de-identified]  YOB: 1960  PCP:    No primary care provider on file. Room:   [address] [place] [address]  Code Status:    Full Code    Physician Requesting Consult: Ernestina Cuadra MD    History of Present Illness:      C/C ;  Severe major depression with psychotic features ([facility] [address]     REASON FOR CONSULT;    Medical comorbidity and medication management ;  Specifically for                                                   Headaches , seasonal allergic rhinitis , history of hydronephrosis          HPI;   dysphagie esophageal  Episodic  With solids   occ emesis   No sob     Principal Problem:    Severe major depression with psychotic features ([address]  Active Problems:    Hydronephrosis    Seasonal allergic rhinitis due to pollen    Esophageal dysphagia  Resolved Problems:    * No resolved hospital problems. *         Past Medical History:   Diagnosis Date    Anxiety d    Cancer ([facility] [address]     Depression     Hypertension     PTSD (post-traumatic stress disorder)     Thyroid disease        Family History   Problem Relation Age of Onset    Alcohol Abuse Father     Coronary Art Dis Father          of MI at age 52    Hypertension Brother     Cancer Brother         Kidney     Social History:     Tobacco:    reports that she has never smoked. She has never used smokeless tobacco.  Alcohol:      reports that she drank alcohol. Drug Use:  reports that she has current or past drug history. Drugs: Cocaine and Marijuana. Review of Systems:     POSITIVE AND NEGATIVES AS DESCRIBED IN HISTORY OF PRESENT ILLNESS ;  IN ADDITION ;  Review of Systems          All other systems negative                  Medications: Allergies:     Allergies   Allergen Reactions    Clindamycin/Lincomycin     Demerol Hcl [Meperidine]     Flagyl [Metronidazole]     Pcn [Penicillins]        Current Meds:   Scheduled Meds:    prazosin  2 mg Oral Nightly    buPROPion  100 mg Oral Daily    sertraline  150 mg Oral Daily    busPIRone  10 mg Oral TID    QUEtiapine  200 mg Oral Nightly    rOPINIRole  2 mg Oral Nightly     Continuous Infusions:   PRN Meds: traZODone, acetaminophen, melatonin ER, hydrOXYzine, ondansetron, benztropine mesylate, magnesium hydroxide, aluminum & magnesium hydroxide-simethicone  . Physical Exam:     Physical Exam   Vitals:    05/28/19 1945 05/29/19 0800 05/29/19 2010 05/30/19 0819   BP: 135/66 (!) 93/59 127/76 102/61   Pulse: 88 70 83 64   Resp: 14 14 14 14   Temp: 97.8 °F (36.6 °C) 97.9 °F (36.6 °C) 98 °F (36.7 °C) 97.6 °F (36.4 °C)   TempSrc: Oral Oral  Oral   SpO2:       Weight:       Height:                       Body mass index is 37.68 kg/m². General Appearance:   -, CO-OPERATIVE ,                                                        Pulmonary/Chest:        Clear to auscultation bilaterally . No wheezes, rales or rhonchi . Cardiovascular:            Normal rate, regular rhythm,                                          No murmur or  Gallop . Abdomen:                       Soft, non-tender                                                                                    Extremities:                    No Edema .                                               Mental status as per psych notes             Data:     Labs:      URINE ANALYSIS: No results found for: LABURIN     CBC:  Lab Results   Component Value Date    WBC 8.0 05/22/2019    HGB 13.4 05/22/2019     05/22/2019        BMP:    Lab Results   Component Value Date     05/22/2019    K 4.3 05/22/2019     05/22/2019    CO2 22 05/22/2019    BUN 15 05/22/2019    CREATININE 1.20 05/22/2019    GLUCOSE 100 05/22/2019      LIVER PROFILE:  Lab Results   Component Value Date    ALT 45 05/22/2019    AST 36 05/22/2019    PROT 6.8 05/22/2019    BILITOT 0.31 05/22/2019    BILIDIR 0.13 04/04/2019    LABALBU 4.0 05/22/2019              No results for input(s): POCGLU in the last 72 hours. Radiology:  No results found. Assessment :      Primary Problem  Severe major depression with psychotic features [facility])    Active Hospital Problems    Diagnosis Date Noted    Esophageal dysphagia [R13.10]  Will get esophagogram  Labs ok 05/30/2019    Seasonal allergic rhinitis due to pollen [J30.1]     Hydronephrosis [N13.30]     Severe major depression with psychotic features ([facility] [address] [F32.3] 04/02/2019              Plan:      Will work up and treata as outlined ;             Current Facility-Administered Medications   Medication Dose Route Frequency Provider Last Rate Last Dose    traZODone (DESYREL) tablet 100 mg  100 mg Oral Nightly PRN Brenna Barrett, APRN - CNP        acetaminophen (TYLENOL) tablet 650 mg  650 mg Oral Q4H PRN Brenna Barrett APRN - CNP        melatonin ER tablet 1 mg  1 mg Oral Nightly PRN Brenna Barrett APRN - CNP   1 mg at 05/29/19 2028    prazosin (MINIPRESS) capsule 2 mg  2 mg Oral Nightly Christopher Romero APRN - CNP   2 mg at 05/29/19 2028    hydrOXYzine (ATARAX) tablet 50 mg  50 mg Oral TID PRN Bela Peralta APRN - CNP   50 mg at 05/30/19 1051    ondansetron (ZOFRAN-ODT) disintegrating tablet 4 mg  4 mg Oral Q8H PRN Bela Peralta APRN - CNP        buPROPion [facility]) tablet 100 mg  100 mg Oral Daily Christopher Nick, APRN - CNP   100 mg at 05/30/19 1284    sertraline (ZOLOFT) tablet 150 mg  150 mg Oral Daily Tama Nick, APRN - CNP   150 mg at 05/30/19 9135    busPIRone (BUSPAR) tablet 10 mg  10 mg Oral TID Tamalaura Romero APRN - CNP   10 mg at 05/30/19 1330    QUEtiapine (SEROQUEL) tablet 200 mg  200 mg Oral Nightly TERRI Lyles CNP   200 mg at 05/29/19 2029    rOPINIRole (REQUIP) tablet 2 mg  2 mg Oral Nightly Christopher Romero, APRN - CNP   2 mg at 05/29/19 2029    benztropine mesylate (COGENTIN) injection 2 mg  2 mg Intramuscular BID PRN Coreen Michelle, APRN - CNP        magnesium hydroxide (MILK OF MAGNESIA) 400 MG/5ML suspension 30 mL  30 mL Oral Daily PRN Coreen Michelle, APRN - CNP        aluminum & magnesium hydroxide-simethicone (MAALOX) 964-610-02 MG/5ML suspension 30 mL  30 mL Oral Q6H PRN Coreen Michelle, APRN - CNP   30 mL at 05/27/19 2114        Thanks for consulting us . Will monitorvitals and clinical course , and  Optimize therapy  as needed . Nolan Jones MD  5/30/2019  1:45 PM    Copy sent to Dr. Fadi Boyle primary care provider on file. Pleasenote that this chart was generated using voice recognition Dragon dictation software. Although every effort was made to ensure the accuracy of this automated transcription, some errors in transcription may have occurred.

## 2019-05-29 NOTE — PROGRESS NOTES
Department of Psychiatry  Attending Progress Note    Chief Complaint: Severe major depression with psychotic features ([address]     SUBJECTIVE:  Ana Luisa Wei is seen in the day area. She states she did not sleep well lat night, but is no longer having nightmares. She states she is not as depressed and denies SI or HI. She feels her anxiety is still high. She is denying AVH. She has been attending groups and is medication compliant. She feels helpless and hopeless at times about her current situation. She is attending to her ADL's. She is having complaints of acid reflux that causes her to become nauseated and have some episodes of emesis. She has been experiencing this since admission, but has not told any of the providers. Chart and medications reviewed. . At this time there is no safe alternative other than inpatient care. OBJECTIVE    Physical  BP (!) 93/59   Pulse 70   Temp 97.9 °F (36.6 °C) (Oral)   Resp 14   Ht 5' 2\" (1.575 m)   Wt 206 lb (93.4 kg)   LMP  (Exact Date)   SpO2 100%   BMI 37.68 kg/m²      Mental Status Evaluation:  Orientation: alertness: alert   Mood:. anxious and depressed but better      Affect:  Blunted, but a little more reactive than previously      Appearance:  casually dressed   Activity:  No psychomotor agitation observed   Gait/Posture: Normal   Speech:  normal pitch and normal volume   Thought Process:  circumstantial   Thought Content:   Within normal limits   Sensorium:  person, place and time/date   Cognition:  grossly intact   Memory: intact   Insight:  fair   Judgment: fair   Suicidal Ideations: Denies suicidal thoughts   Homicidal Ideations: Negative for homicidal ideation      Medication Side Effects: absent       Attention Span attention span appeared shorter than expected for age     Medications  Current Facility-Administered Medications   Medication Dose Route Frequency Provider Last Rate Last Dose    acetaminophen (TYLENOL) tablet 650 mg  650 mg Oral Q4H PRN Emily Kc side effects with patient. · Buspar increased to 10 mg TID, Seroquel increased to 200 mg HS on 5/23/2019. Started Requip 2 mg HS 5/23/2019. Atarax increased to 50 mg TID PRN 5/25/19. · Zofran 4 mg Q8H PRN for nausea and vomiting. · New Order - Prazosin 2mg @ HS for nightmares beginning 5/27/19. · New order- Melatonin Er 1mg PO QHS PRN 5/29/19  · Titrate medications to effect. · Therapeutic activities and groups  · Follow up at [facility] after symptoms stabilized  · Internal medicine consult for acid reflux with nausea and vomiting. · Discharge planning with social work.        Electronically signed by TERRI Jaimes CNP on 5/29/2019 at 12:37 PM.

## 2019-05-29 NOTE — PLAN OF CARE
Problem: Altered Mood, Depressive Behavior:  Goal: Able to verbalize acceptance of life and situations over which he or she has no control  Description  Able to verbalize acceptance of life and situations over which he or she has no control  5/28/2019 2006 by Radha Potter RN  Outcome: Ongoing  Note:   Patient has been calm, controlled and med complaint. Accepting of 1:1 talk time with staff. 1:1 with pt x ten minutes. Pt encouraged to attend unit programming and interact with peers and staff. Pt also encouraged to tend to hygiene and ADLs. Pt encouraged to discuss feelings with staff and feedback and reassurance provided. Patient has been out in the dayroom and social with peers. Reports anxiety 3/10 and depression 5/10 this evening. Problem: Altered Mood, Depressive Behavior:  Goal: Ability to disclose and discuss suicidal ideas will improve  Description  Ability to disclose and discuss suicidal ideas will improve  5/28/2019 2006 by Radha Potter RN  Outcome: Ongoing  Note:   Patient denies suicidal and homicidal thoughts. Patient denies auditory and visual hallucinations. Patient is taking meds and eating well. No self harm noted this shift. Patient agrees to seek staff out if negative thoughts arise. Will continue to monitor Q15 minute and intermittently.

## 2019-05-29 NOTE — GROUP NOTE
Group Therapy Note    Date: May 29    Group Start Time: 1430  Group End Time: 9476  Group Topic: Recovery    RIMMA SYLVESTER    NELY Mercedes, MITCHW        Group Therapy Note    Attendees: 14/19         Patient's Goal:  Increase understanding of addiction and the recovery process. Notes:  Pt is making progress AEB participating in group discussion about overall wellness. Pt shared ways to prioritize her mental health and cope with life stressors. Status After Intervention:  Improved    Participation Level:  Active Listener and Interactive    Participation Quality: Appropriate, Attentive, Sharing and Supportive      Speech:  normal      Thought Process/Content: Logical      Affective Functioning: Congruent      Mood: stable      Level of consciousness:  Alert, Oriented x4 and Attentive      Response to Learning: Able to verbalize current knowledge/experience, Able to verbalize/acknowledge new learning, Able to retain information, Capable of insight, Able to change behavior and Progressing to goal      Endings: None Reported    Modes of Intervention: Support, Socialization and Problem-solving      Discipline Responsible: /Counselor      Signature:  NELY Mercedes, MALENA

## 2019-05-29 NOTE — GROUP NOTE
Group Therapy Note    Date: May 29    Group Start Time: 1100  Group End Time: 2527  Group Topic: Psychotherapy    RIMMA Lemus 26, MSW, LSW        Group Therapy Note    Attendees: 8/10         Patient's Goal: Communication and interpersonal relationships     Notes:      Status After Intervention:  Unchanged    Participation Level:  Active Listener and Interactive    Participation Quality: Appropriate and Attentive      Speech:  normal      Thought Process/Content: Logical      Affective Functioning: Congruent      Mood: euthymic      Level of consciousness:  Alert and Oriented x4      Response to Learning: Able to verbalize current knowledge/experience and Capable of insight      Endings: None Reported    Modes of Intervention: Support      Discipline Responsible: /Counselor

## 2019-05-30 ENCOUNTER — APPOINTMENT (OUTPATIENT)
Dept: GENERAL RADIOLOGY | Age: 59
DRG: 751 | End: 2019-05-30
Payer: MEDICAID

## 2019-05-30 PROBLEM — R13.19 ESOPHAGEAL DYSPHAGIA: Status: ACTIVE | Noted: 2019-05-30

## 2019-05-30 PROCEDURE — 6370000000 HC RX 637 (ALT 250 FOR IP): Performed by: NURSE PRACTITIONER

## 2019-05-30 PROCEDURE — 2500000003 HC RX 250 WO HCPCS: Performed by: INTERNAL MEDICINE

## 2019-05-30 PROCEDURE — 99232 SBSQ HOSP IP/OBS MODERATE 35: CPT | Performed by: INTERNAL MEDICINE

## 2019-05-30 PROCEDURE — 1240000000 HC EMOTIONAL WELLNESS R&B

## 2019-05-30 PROCEDURE — 99232 SBSQ HOSP IP/OBS MODERATE 35: CPT | Performed by: NURSE PRACTITIONER

## 2019-05-30 PROCEDURE — 74220 X-RAY XM ESOPHAGUS 1CNTRST: CPT

## 2019-05-30 PROCEDURE — 99254 IP/OBS CNSLTJ NEW/EST MOD 60: CPT | Performed by: INTERNAL MEDICINE

## 2019-05-30 PROCEDURE — 6370000000 HC RX 637 (ALT 250 FOR IP): Performed by: INTERNAL MEDICINE

## 2019-05-30 RX ORDER — SUCRALFATE 1 G/1
1 TABLET ORAL EVERY 6 HOURS SCHEDULED
Status: DISCONTINUED | OUTPATIENT
Start: 2019-05-30 | End: 2019-06-06 | Stop reason: HOSPADM

## 2019-05-30 RX ORDER — PANTOPRAZOLE SODIUM 40 MG/1
40 TABLET, DELAYED RELEASE ORAL
Status: DISCONTINUED | OUTPATIENT
Start: 2019-05-31 | End: 2019-06-06 | Stop reason: HOSPADM

## 2019-05-30 RX ORDER — TRAZODONE HYDROCHLORIDE 100 MG/1
100 TABLET ORAL NIGHTLY PRN
Status: DISCONTINUED | OUTPATIENT
Start: 2019-05-30 | End: 2019-06-06 | Stop reason: HOSPADM

## 2019-05-30 RX ADMIN — SERTRALINE HYDROCHLORIDE 150 MG: 100 TABLET ORAL at 09:56

## 2019-05-30 RX ADMIN — BARIUM SULFATE 160 ML: 960 POWDER, FOR SUSPENSION ORAL at 09:30

## 2019-05-30 RX ADMIN — BUSPIRONE HYDROCHLORIDE 10 MG: 10 TABLET ORAL at 09:56

## 2019-05-30 RX ADMIN — QUETIAPINE FUMARATE 200 MG: 200 TABLET ORAL at 21:29

## 2019-05-30 RX ADMIN — BARIUM SULFATE 140 ML: 980 POWDER, FOR SUSPENSION ORAL at 09:30

## 2019-05-30 RX ADMIN — SUCRALFATE 1 G: 1 TABLET ORAL at 17:26

## 2019-05-30 RX ADMIN — HYDROXYZINE HYDROCHLORIDE 50 MG: 50 TABLET, FILM COATED ORAL at 21:29

## 2019-05-30 RX ADMIN — ROPINIROLE HYDROCHLORIDE 2 MG: 2 TABLET, FILM COATED ORAL at 21:29

## 2019-05-30 RX ADMIN — TRAZODONE HYDROCHLORIDE 100 MG: 100 TABLET ORAL at 21:29

## 2019-05-30 RX ADMIN — BUPROPION HYDROCHLORIDE 100 MG: 100 TABLET, FILM COATED ORAL at 09:56

## 2019-05-30 RX ADMIN — PRAZOSIN HYDROCHLORIDE 2 MG: 1 CAPSULE ORAL at 21:29

## 2019-05-30 RX ADMIN — BUSPIRONE HYDROCHLORIDE 10 MG: 10 TABLET ORAL at 13:30

## 2019-05-30 RX ADMIN — SUCRALFATE 1 G: 1 TABLET ORAL at 15:32

## 2019-05-30 RX ADMIN — BUSPIRONE HYDROCHLORIDE 10 MG: 10 TABLET ORAL at 21:53

## 2019-05-30 RX ADMIN — HYDROXYZINE HYDROCHLORIDE 50 MG: 50 TABLET, FILM COATED ORAL at 10:51

## 2019-05-30 RX ADMIN — Medication 1 MG: at 21:29

## 2019-05-30 ASSESSMENT — PAIN DESCRIPTION - PAIN TYPE: TYPE: ACUTE PAIN

## 2019-05-30 ASSESSMENT — PAIN DESCRIPTION - LOCATION: LOCATION: GENERALIZED

## 2019-05-30 ASSESSMENT — PAIN SCALES - GENERAL: PAINLEVEL_OUTOF10: 8

## 2019-05-30 NOTE — GROUP NOTE
Group Therapy Note    Date: May 30    Group Start Time: 1000  Group End Time: 1943  Group Topic: Recreational    [facility]    BOSTON Yougner    Patient's Goal:  Participate in leisure skills activity, demonstrate increased interpersonal interaction. Notes:  Pt participated in group activity and was supportive of peers. Status After Intervention:  Improved    Participation Level:  Active Listener and Interactive    Participation Quality: Appropriate, Attentive, Sharing and Supportive    Speech:  normal    Thought Process/Content: Logical    Affective Functioning: Congruent    Level of consciousness:  Alert, Oriented x4 and Attentive    Response to Learning: Able to verbalize current knowledge/experience, Capable of insight and Progressing to goal    Endings: None Reported    Modes of Intervention: Education, Support, Socialization, Exploration, Problem-solving, Activity and Limit-setting    Discipline Responsible: Psychoeducational Specialist    Signature:  Edward Younger

## 2019-05-30 NOTE — GROUP NOTE
Group Therapy Note    Date: May 30    Group Start Time: 1330  Group End Time: 7830  Group Topic: Recreational    [facility] [address]        Group Therapy Note    Attendees: 11         Patient's Goal:  Patient will demonstrate increased interpersonal interaction     Notes:  Patient attended group and participated fully. Social with peers. Status After Intervention:  Improved    Participation Level:  Active Listener and Interactive    Participation Quality: Appropriate, Attentive and Sharing      Speech:  normal      Thought Process/Content: Logical      Affective Functioning: Congruent      Mood: euthymic      Level of consciousness:  Alert and Oriented x4      Response to Learning: Progressing to goal      Endings: None Reported    Modes of Intervention: Socialization and Activity      Discipline Responsible: Psychoeducational Specialist      Signature:  Pablo Springer

## 2019-05-30 NOTE — CONSULTS
[facility] Internal Medicine    CONSULTATION  / FOLLOW UP VISIT       Date:   5/30/2019  Patient name:  Vickie Nagel  Date of admission:  5/22/2019  1:26 PM  MRN:   [id number]  Account:  [de-identified]  YOB: 1960  PCP:    No primary care provider on file. Room:   [address] [place] [address]  Code Status:    Full Code    Physician Requesting Consult: Sammy Art MD    History of Present Illness:      C/C ;  Severe major depression with psychotic features ([address]     REASON FOR CONSULT;    Medical comorbidity and medication management ;  Specifically for                                                   Headaches , seasonal allergic rhinitis , history of hydronephrosis          HPI;   dysphagie esophageal  Episodic  With solids   occ emesis   No sob     Principal Problem:    Severe major depression with psychotic features ([address]  Active Problems:    Hydronephrosis    Seasonal allergic rhinitis due to pollen    Esophageal dysphagia       Reviewed ba swallow . esphagitis , needs EGD   FINDINGS:   Presbyesophagus with tertiary contractions. An area of delayed peristalsis   and mucosal irregularity at the mid esophagus may represent sequela of   esophagitis or developing stricture. Underlying neoplasm is not entirely   excluded. A radiopaque pill was ingested and sticks in this area. Subsequent ingestion   of liquid clears the tablet. Reflux is evident. Impression   Some mucosal irregularity mid esophagus possibly sequela of longstanding   reflux. At this site, the barium tablet sticks. Consider endoscopic   evaluation to exclude early neoplasm. The findings were sent to the Radiology Results [address] at 10:04   am on 5/30/2019to be communicated to a licensed caregiver. Resolved Problems:    * No resolved hospital problems.  *         Past Medical History:   Diagnosis Date    Anxiety d    Cancer ([address]     Depression  Hypertension     PTSD (post-traumatic stress disorder)     Thyroid disease        Family History   Problem Relation Age of Onset    Alcohol Abuse Father     Coronary Art Dis Father          of MI at age 52    Hypertension Brother     Cancer Brother         Kidney     Social History:     Tobacco:    reports that she has never smoked. She has never used smokeless tobacco.  Alcohol:      reports that she drank alcohol. Drug Use:  reports that she has current or past drug history. Drugs: Cocaine and Marijuana. Review of Systems:     POSITIVE AND NEGATIVES AS DESCRIBED IN HISTORY OF PRESENT ILLNESS ;  IN ADDITION ;  Review of Systems          All other systems negative                  Medications: Allergies: Allergies   Allergen Reactions    Clindamycin/Lincomycin     Demerol Hcl [Meperidine]     Flagyl [Metronidazole]     Pcn [Penicillins]        Current Meds:   Scheduled Meds:    prazosin  2 mg Oral Nightly    buPROPion  100 mg Oral Daily    sertraline  150 mg Oral Daily    busPIRone  10 mg Oral TID    QUEtiapine  200 mg Oral Nightly    rOPINIRole  2 mg Oral Nightly     Continuous Infusions:   PRN Meds: traZODone, acetaminophen, melatonin ER, hydrOXYzine, ondansetron, benztropine mesylate, magnesium hydroxide, aluminum & magnesium hydroxide-simethicone  . Physical Exam:     Physical Exam   Vitals:    19 1945 19 0800 19 0819   BP: 135/66 (!) 93/59 127/76 102/61   Pulse: 88 70 83 64   Resp: 14 14 14 14   Temp: 97.8 °F (36.6 °C) 97.9 °F (36.6 °C) 98 °F (36.7 °C) 97.6 °F (36.4 °C)   TempSrc: Oral Oral  Oral   SpO2:       Weight:       Height:                       Body mass index is 37.68 kg/m². General Appearance:   -, CO-OPERATIVE ,                                                        Pulmonary/Chest:        Clear to auscultation bilaterally . No wheezes, rales or rhonchi .      Cardiovascular:

## 2019-05-30 NOTE — BH NOTE
Barium Swallow complete, ABNORMAL, Atiya notified. Med Consult- GI- Dr Vasquez Cheek paged [phone]. Awaiting call back.

## 2019-05-30 NOTE — PROGRESS NOTES
Department of Psychiatry  Attending Progress Note    Chief Complaint: Severe major depression with psychotic features ([address]     SUBJECTIVE:  Cassie Hernandes is seen during treatment team.  She states she feels less depressed than when admitted. Her main concern is she is not sleeping well. She feels she would be less anxious and depressed if she could have a good night's sleep. She went down for a barium swallow to evaluate the reflux and nausea/vomiting when eating. She is attending groups and is social with peers. She feels helpless and hopeless at times. Feels like medications are starting to work, but she does not feel happy quite yet. Chart and medications reviewed. . At this time there is no safe alternative other than inpatient care. OBJECTIVE    Physical  /61   Pulse 64   Temp 97.6 °F (36.4 °C) (Oral)   Resp 14   Ht 5' 2\" (1.575 m)   Wt 206 lb (93.4 kg)   LMP  (Exact Date)   SpO2 100%   BMI 37.68 kg/m²      Mental Status Evaluation:  Orientation: alertness: alert   Mood:. anxious and depressed but better      Affect:  Blunted, but a little more reactive than previously      Appearance:  casually dressed   Activity:  No psychomotor agitation observed   Gait/Posture: Normal   Speech:  normal pitch and normal volume   Thought Process:  circumstantial   Thought Content:   Within normal limits   Sensorium:  person, place and time/date   Cognition:  grossly intact   Memory: intact   Insight:  fair   Judgment: fair   Suicidal Ideations: Denies suicidal thoughts   Homicidal Ideations: Negative for homicidal ideation      Medication Side Effects: absent       Attention Span attention span appeared shorter than expected for age     Medications  Current Facility-Administered Medications   Medication Dose Route Frequency Provider Last Rate Last Dose    traZODone (DESYREL) tablet 100 mg  100 mg Oral Nightly PRN TERRI Soliz CNP        acetaminophen (TYLENOL) tablet 650 mg  650 mg Oral Q4H PRN Miguelina House increasing Seroquel if no improvement in sleep or mood   · Zofran 4 mg Q8H PRN for nausea and vomiting. · New Order - Prazosin 2mg @ HS for nightmares beginning 5/27/19. · New order- Melatonin Er 1mg PO QHS PRN 5/29/19  · Titrate medications to effect. · Therapeutic activities and groups  · Follow up at [facility] after symptoms stabilized  · Internal medicine consult for acid reflux with nausea and vomiting. · Discharge planning with social work.        Electronically signed by TERRI Moreno CNP on 5/30/2019 at 11:40 AM.

## 2019-05-30 NOTE — GROUP NOTE
Group Therapy Note    Date: May 30    Group Start Time: 1100  Group End Time: 2557  Group Topic: Psychotherapy    RIMMA SYLVESTER    NELY Bettencourt LSW        Group Therapy Note    Attendees: 10/10         Patient's Goal:Interpersonal relationships, communication       Notes:      Status After Intervention:  Unchanged    Participation Level: Interactive    Participation Quality: Appropriate, Attentive, Sharing and Supportive      Speech:  normal      Thought Process/Content: Logical  Linear      Affective Functioning: Congruent      Mood: euthymic      Level of consciousness:  Alert, Oriented x4 and Attentive      Response to Learning: Able to verbalize current knowledge/experience, Able to verbalize/acknowledge new learning, Able to retain information and Capable of insight      Endings: None Reported    Modes of Intervention: Support      Discipline Responsible: /Counselor      Signature:   NELY Bettencourt LSW

## 2019-05-30 NOTE — BH NOTE
Juanjose Hernandes from Radiology called with pt's results via [facility]. Per Kristi Vargas MUCOSAL IRREGULARITY MID ESOPHAGUS, POSSIBLE SQUAELA OF LONG STANDING REFLUX. CONSIDER ENDOSCOPIC EVALUATION. \" Dr Dale Edwards paged.  [address] called back, was notified by Vishal Aceves with results, stated \"OK\"

## 2019-05-30 NOTE — PLAN OF CARE
[facility] Interdisciplinary Treatment Plan Note     Review Date & Time: 5/30/2019   0917    Patient was in treatment team.    Admission Type:   Admission Type: Voluntary    Reason for admission:  Reason for Admission: Depression with Suicidal thoughts    Estimated Length of Stay Update:  5-7 days    Estimated Discharge Date Update: to be determined by physician     PATIENT STRENGTHS:  Patient Strengths:Strengths: Communication, No significant Physical Illness, Connection to output provider, Positive Support, Social Skills, Medication Compliance  Patient Strengths and Limitations:Limitations: Hopeless about future  Addictive Behavior:Addictive Behavior  In the past 3 months, have you felt or has someone told you that you have a problem with:  : None  Do you have a history of Chemical Use?: No  Do you have a history of Alcohol Use?: Yes  Do you have a history of Street Drug Abuse?: Yes  Histroy of Prescripton Drug Abuse?: No  Medical Problems:   Past Medical History:   Diagnosis Date    Anxiety d    Cancer ([facility] [address]     Depression     Hypertension     PTSD (post-traumatic stress disorder)     Thyroid disease        Risk:  Fall RiskTotal: 59  Kenji Scale Kenji Scale Score: 22  BVC Total: 0  Change in scores0. Changes to plan of Care 0    Status EXAM:   Status and Exam  Normal: Yes  Facial Expression: Brightened  Affect: Appropriate  Level of Consciousness: Alert  Mood:Normal: No  Mood: Depressed, Anxious  Motor Activity:Normal: Yes  Motor Activity: Decreased(pt reports poor sleep)  Interview Behavior: Cooperative  Preception: [place] to Person, [place] Stare to Time, [place] to Place, [place] to Situation  Attention:Normal: Yes  Attention: Distractible  Thought Processes: (logical)  Thought Content:Normal: No  Thought Content: Preoccupations  Hallucinations:  Auditory (Comment)  Delusions: No  Memory:Normal: Yes  Memory: Poor Recent  Insight and Judgment: No  Insight and Judgment: Poor Judgment, Poor

## 2019-05-30 NOTE — GROUP NOTE
Group Therapy Note    Date: May 30    Group Start Time: 8523  Group End Time: 1700  Group Topic: Group Documentation    [facility] WILTON Anand        Group Therapy Note    Attendees: 12/19         Patient's Goal:  Attend, participate, and support others in group.   Notes:  Failures -- What we learn from these events    Status After Intervention:  Improved    Participation Level: Interactive    Participation Quality: Appropriate      Speech:  normal      Thought Process/Content: Logical  Flight of ideas      Affective Functioning: Congruent      Mood: anxious      Level of consciousness:  Alert and Attentive      Response to Learning: Able to verbalize current knowledge/experience, Able to verbalize/acknowledge new learning and Capable of insight      Endings: None Reported    Modes of Intervention: Education, Support, Exploration and Problem-solving      Discipline Responsible: [address], [employer]      Signature:  Luz Anand

## 2019-05-30 NOTE — BH NOTE
Alyse from [address] [place] [address] called, stated \"no one can see her for 2-3 weeks, and that Physician would have to do a Physical Referral once discharged. \"

## 2019-05-30 NOTE — PLAN OF CARE
Pt denies any suicidal thoughts. Some auditory hallucinations, she states that she hears someone calling her name at times, throughout the day. Isolative in the morning. Attending some groups.

## 2019-05-30 NOTE — PLAN OF CARE
Problem: Altered Mood, Depressive Behavior:  Goal: Able to verbalize acceptance of life and situations over which he or she has no control  Description  Able to verbalize acceptance of life and situations over which he or she has no control  Outcome: Ongoing     Problem: Altered Mood, Depressive Behavior:  Goal: Ability to disclose and discuss suicidal ideas will improve  Description  Ability to disclose and discuss suicidal ideas will improve  Outcome: Ongoing  Note:   Pt denies thoughts of self harm and is agreeable to seeking out should thoughts of self harm arise. Safe environment maintained. Q15 minute checks for safety cont per unit policy. Will cont to monitor for safety and provides support and reassurance as needed.

## 2019-05-30 NOTE — GROUP NOTE
Group Therapy Note    Date: May 30    Group Start Time: 9354  Group End Time: 0920  Group Topic: [facility], [facility]    Patient did not attend Community Meeting/Goal Setting Group after encouragement from staff. 1:1 talk time offered but patient refused.       Signature:  Randi Neves

## 2019-05-30 NOTE — CONSULTS
Inpatient consult to GI  Consult performed by: Aidan Corbett MD  Consult ordered by: Amy Day MD           GI Consult Note:    Name: Raheel Correa  MRN: [id number]     Landon: [de-identified]  Room: [facility]0103-01    Admit Date: 5/22/2019  PCP: No primary care provider on file. Physician Requesting Consult: Monica Carl MD     Reason for Consult:  Nausea and vomiting  Abdominal pains  GERD  Dysphagia  Hx of colon cancer  Mod obesity      Chief Complaint:     Chief Complaint   Patient presents with    Suicidal       History Obtained From:     Patient and EMR    History of Present Illness:      Raheel Correa is a  59 y.o.  female who presents with Suicidal    This patient has hx for multiple med and psych issues  She has been complaining of some sig GERD symptoms and epig burning  Has mild dysphagia ? Choking  Has no bleeding  No melena  Hx of drug abuse in the past  Has ileostomy and says that has hx of colon cancer  Has been started on PPI and Carafate today  Patient has been complaining of some abdominal pains, off and on cramping  Also complains of abdominal bloating and gas  Has off and on nausea without any sig vomiting  Has some alternating constipation and diarrhea  Has no weight loss  Has some anxiety issues      Symptoms:  Onset:  Location:  abdomen  Duration:  day(s)  Severity:  mild, moderate  Quality:  intermittent      Past Medical History:     Past Medical History:   Diagnosis Date    Anxiety d    Cancer ([address]     Depression     Hypertension     PTSD (post-traumatic stress disorder)     Thyroid disease         Past Surgical History:     Past Surgical History:   Procedure Laterality Date    HYSTERECTOMY      JOINT REPLACEMENT          Medications Prior to Admission:       Prior to Admission medications    Medication Sig Start Date End Date Taking?  Authorizing Provider   hydrOXYzine (ATARAX) 25 MG tablet Take 25 mg by mouth 3 times daily as needed for Anxiety   Yes Historical Provider, MD   rOPINIRole (REQUIP) 1 MG tablet Take 1 mg by mouth nightly   Yes Historical Provider, MD   busPIRone (BUSPAR) 5 MG tablet Take 1 tablet by mouth 3 times daily 4/15/19  Yes TERRI Zimmerman   buPROPion (WELLBUTRIN) 100 MG tablet Take 1 tablet by mouth daily 19  Yes TERRI Zimmerman   sertraline (ZOLOFT) 50 MG tablet Take 3 tablets by mouth daily 19  Yes TERRI Zimmerman   QUEtiapine (SEROQUEL) 100 MG tablet Take 1 tablet by mouth nightly 4/15/19  Yes TERRI Zimmerman        Allergies:       Clindamycin/lincomycin; Demerol hcl [meperidine]; Flagyl [metronidazole]; and Pcn [penicillins]    Social History:     Tobacco:    reports that she has never smoked. She has never used smokeless tobacco.  Alcohol:      reports that she drank alcohol. Drug Use:  reports that she has current or past drug history. Drugs: Cocaine and Marijuana.     Family History:     Family History   Problem Relation Age of Onset    Alcohol Abuse Father     Coronary Art Dis Father          of MI at age 52    Hypertension Brother     Cancer Brother         Kidney       Review of Systems:     Positive and Negative as described in HPI    Constitutional:  negative for  fevers, chills, sweats, + fatigue, and weight loss  HEENT:  negative for vision or hearing changes,   Respiratory:  negative for shortness of breath, cough, or congestion  Cardiovascular:  Atypical  chest pain, palpitations  Gastrointestinal:  + nausea, vomiting, diarrhea, constipation,+ abdominal pain  Genitourinary:  negative for frequency, dysuria  Integument:  negative for rash, skin lesions  Musculoskeletal:  + muscle aches or joint pain  Neurological:  +  headaches, dizziness, lightheadedness, numbness, pain and tingling extrimities  Behavior/Psych:  +  depression and anxiety    Code Status:  Full Code    Physical Exam:     Vitals:  /61   Pulse 64   Temp 97.6 °F (36.4 °C) (Oral)   Resp 14   Ht 5' 2\" (1.575 m) Wt 206 lb (93.4 kg)   LMP  (Exact Date)   SpO2 100%   BMI 37.68 kg/m²   Temp (24hrs), Av.8 °F (36.6 °C), Min:97.6 °F (36.4 °C), Max:98 °F (36.7 °C)      General appearance - alert, obesity well appearing, and in no acute distress  Mental status - oriented to person, place, and time with anxious  affect  Head - normocephalic and atraumatic  Eyes - pupils equal and reactive, extraocular eye movements intact, conjunctiva clear  Ears - hearing appears to be intact  Nose - no drainage noted  Mouth - mucous membranes moist  Neck - supple, no carotid bruits, thyroid not palpable  Chest - clear to auscultation, normal effort  Heart - normal rate, regular rhythm, no murmurs  Abdomen - soft, +  tenderness, nondistended, bowel sounds present all four quadrants, no masses, hepatomegaly or splenomegaly.  No hernias  Neurological - normal speech, no focal findings or movement disorder noted, cranial nerves II through XII grossly intact  Extremities - peripheral pulses palpable, no pedal edema or calf pain with palpation  Skin - no gross lesions, rashes, or induration noted  Cranial Nerves : grossly intact  Lymph nodes: not palpable    Data:   CBC:   Lab Results   Component Value Date    WBC 8.0 2019    RBC 4.99 2019    HGB 13.4 2019    HCT 40.8 2019    MCV 81.7 2019    MCH 26.8 2019    MCHC 32.8 2019    RDW 13.6 2019     2019    MPV 8.8 2019     CBC with Differential:    Lab Results   Component Value Date    WBC 8.0 2019    RBC 4.99 2019    HGB 13.4 2019    HCT 40.8 2019     2019    MCV 81.7 2019    MCH 26.8 2019    MCHC 32.8 2019    RDW 13.6 2019    LYMPHOPCT 15 2019    MONOPCT 9 2019    BASOPCT 1 2019    MONOSABS 0.70 2019    LYMPHSABS 1.20 2019    EOSABS 0.30 2019    BASOSABS 0.10 2019    DIFFTYPE NOT REPORTED 2019     Hemoglobin/Hematocrit:    Lab Results   Component Value Date    HGB 13.4 05/22/2019    HCT 40.8 05/22/2019     CMP:    Lab Results   Component Value Date     05/22/2019    K 4.3 05/22/2019     05/22/2019    CO2 22 05/22/2019    BUN 15 05/22/2019    CREATININE 1.20 05/22/2019    GFRAA 56 05/22/2019    LABGLOM 46 05/22/2019    GLUCOSE 100 05/22/2019    PROT 6.8 05/22/2019    LABALBU 4.0 05/22/2019    CALCIUM 8.6 05/22/2019    BILITOT 0.31 05/22/2019    ALKPHOS 91 05/22/2019    AST 36 05/22/2019    ALT 45 05/22/2019     BMP:    Lab Results   Component Value Date     05/22/2019    K 4.3 05/22/2019     05/22/2019    CO2 22 05/22/2019    BUN 15 05/22/2019    LABALBU 4.0 05/22/2019    CREATININE 1.20 05/22/2019    CALCIUM 8.6 05/22/2019    GFRAA 56 05/22/2019    LABGLOM 46 05/22/2019    GLUCOSE 100 05/22/2019     PT/INR:    Lab Results   Component Value Date    PROTIME 12.4 04/02/2019    INR 0.9 04/02/2019     PTT:    Lab Results   Component Value Date    APTT 25.1 04/02/2019   [APTT}    Assesment:     Primary Problem  Severe major depression with psychotic features [facility])    Active Hospital Problems    Diagnosis Date Noted    Esophageal dysphagia [R13.10] 05/30/2019    Seasonal allergic rhinitis due to pollen [J30.1]     Hydronephrosis [N13.30]     Severe major depression with psychotic features ([address] [F32.3] 04/02/2019     Nausea and vomiting  Abdominal pains  GERD  Dysphagia  Hx of colon cancer  Mod obesity      Plan:     1. Cont PPI and Carafate  2. If no better Plan EGD  Pt seems to have signs and symptoms consistent with GERD, acid indigestion and heartburns. She was discussed  in detail about some possible life style and dietary modifications. She was stressed about the maintenance  of appropriate weight and effect of obesity contributing to reflux symptoms. Routine exercise was streesed. Avoidance of Caffeine, nicotine and chocolate were explained. Pt was asked to avoid spices grease and fried food.  Advices were also given about avoidance of any kind of fast foods, soda pops and high energy drinks. Pt was advised to place two small block under the head end of the bed which may help with night time reflux. Was advised not to eat any thin at least 2-3 hrs before going to bed and walk especially after dinner    Pt has verbalized understanding and agreement to this plan. Thank you for allowing me to participate in the care of your patient. Please feel free to contact me with any questions or concerns. Electronically signed by Hali Juarez MD on 5/30/2019 at 4:07 PM     Copy sent to Dr. Lanier primary care provider on file.

## 2019-05-31 PROCEDURE — 99232 SBSQ HOSP IP/OBS MODERATE 35: CPT | Performed by: NURSE PRACTITIONER

## 2019-05-31 PROCEDURE — 6370000000 HC RX 637 (ALT 250 FOR IP): Performed by: INTERNAL MEDICINE

## 2019-05-31 PROCEDURE — 6370000000 HC RX 637 (ALT 250 FOR IP): Performed by: NURSE PRACTITIONER

## 2019-05-31 PROCEDURE — 1240000000 HC EMOTIONAL WELLNESS R&B

## 2019-05-31 PROCEDURE — 99232 SBSQ HOSP IP/OBS MODERATE 35: CPT | Performed by: INTERNAL MEDICINE

## 2019-05-31 RX ORDER — QUETIAPINE FUMARATE 300 MG/1
300 TABLET, FILM COATED ORAL NIGHTLY
Status: DISCONTINUED | OUTPATIENT
Start: 2019-05-31 | End: 2019-06-06 | Stop reason: HOSPADM

## 2019-05-31 RX ADMIN — SUCRALFATE 1 G: 1 TABLET ORAL at 17:59

## 2019-05-31 RX ADMIN — BUSPIRONE HYDROCHLORIDE 10 MG: 10 TABLET ORAL at 08:36

## 2019-05-31 RX ADMIN — HYDROXYZINE HYDROCHLORIDE 50 MG: 50 TABLET, FILM COATED ORAL at 12:48

## 2019-05-31 RX ADMIN — BUSPIRONE HYDROCHLORIDE 10 MG: 10 TABLET ORAL at 21:06

## 2019-05-31 RX ADMIN — BUSPIRONE HYDROCHLORIDE 10 MG: 10 TABLET ORAL at 12:48

## 2019-05-31 RX ADMIN — HYDROXYZINE HYDROCHLORIDE 50 MG: 50 TABLET, FILM COATED ORAL at 21:06

## 2019-05-31 RX ADMIN — BUPROPION HYDROCHLORIDE 100 MG: 100 TABLET, FILM COATED ORAL at 08:36

## 2019-05-31 RX ADMIN — SUCRALFATE 1 G: 1 TABLET ORAL at 06:35

## 2019-05-31 RX ADMIN — TRAZODONE HYDROCHLORIDE 100 MG: 100 TABLET ORAL at 21:06

## 2019-05-31 RX ADMIN — Medication 1 MG: at 21:06

## 2019-05-31 RX ADMIN — PANTOPRAZOLE SODIUM 40 MG: 40 TABLET, DELAYED RELEASE ORAL at 06:35

## 2019-05-31 RX ADMIN — PRAZOSIN HYDROCHLORIDE 2 MG: 1 CAPSULE ORAL at 21:06

## 2019-05-31 RX ADMIN — SERTRALINE HYDROCHLORIDE 150 MG: 100 TABLET ORAL at 08:36

## 2019-05-31 RX ADMIN — SUCRALFATE 1 G: 1 TABLET ORAL at 12:46

## 2019-05-31 RX ADMIN — QUETIAPINE FUMARATE 300 MG: 300 TABLET ORAL at 21:06

## 2019-05-31 RX ADMIN — ROPINIROLE HYDROCHLORIDE 2 MG: 2 TABLET, FILM COATED ORAL at 21:06

## 2019-05-31 ASSESSMENT — PAIN DESCRIPTION - LOCATION: LOCATION: BACK;NECK

## 2019-05-31 ASSESSMENT — PAIN SCALES - GENERAL: PAINLEVEL_OUTOF10: 6

## 2019-05-31 ASSESSMENT — PAIN DESCRIPTION - PAIN TYPE: TYPE: ACUTE PAIN

## 2019-05-31 NOTE — PROGRESS NOTES
GI Progress notes    2019   12:19 PM    Name:  Roberta Zavala  MRN:    [id number]     Landon:     [de-identified]   Room:  [address] [place] [address] Day: 9     Admit Date: 2019  1:26 PM  PCP: No primary care provider on file. Subjective:     C/C:   Chief Complaint   Patient presents with    Suicidal       Interval History: Status: not changed. Inpatient at [facility]  C/O sign GERD symptoms, epigastric burning, and dysphagia. Denies emesis, constipation, diarrhea, abdominal pain. Esophagram showed mucosal irregularity, EGD r/o esophagitis/stricture    ROS:  Constitutional: negative for chills, fevers and sweats  Respiratory: Negative for hemoptysis,   Gastrointestinal: negative for abdominal pain, constipation, diarrhea,  + nausea and vomiting      Medications: Allergies:    Allergies   Allergen Reactions    Clindamycin/Lincomycin     Demerol Hcl [Meperidine]     Flagyl [Metronidazole]     Pcn [Penicillins]        Current Meds:   traZODone (DESYREL) tablet 100 mg Nightly PRN   pantoprazole (PROTONIX) tablet 40 mg QAM AC   sucralfate (CARAFATE) tablet 1 g 4 times per day   acetaminophen (TYLENOL) tablet 650 mg Q4H PRN   melatonin ER tablet 1 mg Nightly PRN   prazosin (MINIPRESS) capsule 2 mg Nightly   hydrOXYzine (ATARAX) tablet 50 mg TID PRN   ondansetron (ZOFRAN-ODT) disintegrating tablet 4 mg Q8H PRN   buPROPion (WELLBUTRIN) tablet 100 mg Daily   sertraline (ZOLOFT) tablet 150 mg Daily   busPIRone (BUSPAR) tablet 10 mg TID   QUEtiapine (SEROQUEL) tablet 200 mg Nightly   rOPINIRole (REQUIP) tablet 2 mg Nightly   benztropine mesylate (COGENTIN) injection 2 mg BID PRN   magnesium hydroxide (MILK OF MAGNESIA) 400 MG/5ML suspension 30 mL Daily PRN   aluminum & magnesium hydroxide-simethicone (MAALOX) 200-200-20 MG/5ML suspension 30 mL Q6H PRN       Data:     Code Status:  Full Code    Family History   Problem Relation Age of Onset    Alcohol Abuse Father     Coronary Art Dis Father          of MI at age 52    Hypertension Brother     Cancer Brother         Kidney       Social History     Socioeconomic History    Marital status:      Spouse name: Not on file    Number of children: Not on file    Years of education: Not on file    Highest education level: Not on file   Occupational History    Not on file   Social Needs    Financial resource strain: Not on file    Food insecurity:     Worry: Not on file     Inability: Not on file    Transportation needs:     Medical: Not on file     Non-medical: Not on file   Tobacco Use    Smoking status: Never Smoker    Smokeless tobacco: Never Used   Substance and Sexual Activity    Alcohol use: Not Currently    Drug use: Not Currently     Types: Cocaine, Marijuana     Comment: Crack Cocaine (smokes) for past couple years    Sexual activity: Not Currently   Lifestyle    Physical activity:     Days per week: Not on file     Minutes per session: Not on file    Stress: Not on file   Relationships    Social connections:     Talks on phone: Not on file     Gets together: Not on file     Attends Rastafarian service: Not on file     Active member of club or organization: Not on file     Attends meetings of clubs or organizations: Not on file     Relationship status: Not on file    Intimate partner violence:     Fear of current or ex partner: Not on file     Emotionally abused: Not on file     Physically abused: Not on file     Forced sexual activity: Not on file   Other Topics Concern    Not on file   Social History Narrative    Not on file       Vitals:  /65   Pulse 72   Temp 97.8 °F (36.6 °C) (Oral)   Resp 14   Ht 5' 2\" (1.575 m)   Wt 206 lb (93.4 kg)   LMP  (Exact Date)   SpO2 100%   BMI 37.68 kg/m²   Temp (24hrs), Av °F (36.7 °C), Min:97.8 °F (36.6 °C), Max:98.2 °F (36.8 °C)    No results for input(s): POCGLU in the last 72 hours. I/O (24Hr):   No intake or output data in the 24 hours ending 19 1219    Labs:      CBC: Lab Results

## 2019-05-31 NOTE — GROUP NOTE
Group Therapy Note    Date: May 31    Group Start Time: 1445  Group End Time: 1525  Group Topic: Cognitive Skills    RIMMA Stauffer, [address]    Attendees: 9       Patient's Goal:  To demonstrate increased interpersonal skills. Notes:  Patient attended group and actively participated in task at hand. Patient conversed appropriately with peers and [place]. Status After Intervention:  Improved       Participation Level:  Active Listener and Interactive       Participation Quality: Appropriate, Attentive, Sharing and Supportive      Speech:  normal      Thought Process/Content: Logical      Affective Functioning: Congruent      Level of consciousness:  Alert, Oriented x4 and Attentive      Response to Learning: Able to verbalize current knowledge/experience, Able to verbalize/acknowledge new learning, Able to retain information, Capable of insight and Progressing to goal      Endings: None Reported       Modes of Intervention: Socialization, Exploration, Clarifying, Problem-solving, Activity, Limit-setting and Reality-testing      Discipline Responsible: Psychoeducational Specialist      Signature:  Peyton Muro

## 2019-05-31 NOTE — GROUP NOTE
Group Therapy Note    Date: May 31    Group Start Time: 1000  Group End Time: 6012  Group Topic: Recreational    [address], [facility]    Patient refused to attend Recreational Therapy Group at 1000 after encouragement from staff. 1:1 talk time offered but patient refused.      Signature:  Peyton Muro

## 2019-05-31 NOTE — GROUP NOTE
Group Therapy Note    Date: May 31    Group Start Time: 1398  Group End Time: 1700  Group Topic: Group Documentation    [facility] WILTON Palomino        Group Therapy Note    Attendees: 10/17         Patient's Goal:  Attend and participate in wellness group  Notes:  [facility]    Status After Intervention:  Improved       Participation Level:       Participation Quality: Appropriate      Speech:  normal      Thought Process/Content: Logical      Affective Functioning: Congruent      Mood: anxious      Level of consciousness:  Alert      Response to Learning: Able to verbalize current knowledge/experience      Endings: None Reported    Modes of Intervention: Education and Support      Discipline Responsible: [employer]      Signature:  Phylicia Palomino

## 2019-05-31 NOTE — GROUP NOTE
Group Therapy Note    Date: May 31    Group Start Time: 0900  Group End Time: 0920  Group Topic: Community Meeting    RIMMA SRUTHII A    [place], [place]    Attendees: 10         Patient's Goal for Today:  Improve mood. Attend groups. Notes:  Patient was 10 minutes late to group due to working on self care ADLs. Status After Intervention:  Improved    Participation Level:  Active Listener and Interactive    Participation Quality: Appropriate, Attentive and Sharing      Speech:  normal      Thought Process/Content: Logical      Affective Functioning: Congruent      Level of consciousness:  Alert and Attentive      Response to Learning: Able to verbalize current knowledge/experience, Able to verbalize/acknowledge new learning, Able to retain information, Capable of insight and Progressing to goal      Endings: None Reported    Modes of Intervention: Support, Socialization, Exploration, Clarifying, Problem-solving, Confrontation, Limit-setting and Reality-testing      Discipline Responsible: Psychoeducational Specialist      Signature:  Christa Cortez

## 2019-05-31 NOTE — PLAN OF CARE
Pt has been more active, attending all groups, feeling less anxiety/depression. Social with peers. Brighter affect. Controlled and coop. Good appetite and adl's.

## 2019-05-31 NOTE — GROUP NOTE
Group Therapy Note    Date: May 31    Group Start Time: 1330  Group End Time: 1430  Group Topic: Cognitive Skills    [facility]    Patient's Goal:  Demonstrate improved mood, demonstrate increased interpersonal interaction     Notes:  Pt was interactive and supportive of peers in group. Status After Intervention:  Improved    Participation Level:  Active Listener and Interactive    Participation Quality: Appropriate, Attentive, Sharing and Supportive    Speech:  normal    Thought Process/Content: Logical    Affective Functioning: Congruent    Level of consciousness:  Alert, Oriented x4 and Attentive    Response to Learning: Able to verbalize current knowledge/experience, Capable of insight and Progressing to goal    Endings: None Reported    Modes of Intervention: Education, Support, Socialization and Exploration    Discipline Responsible: Psychoeducational Specialist    Signature:  Eve Torres, [address]

## 2019-05-31 NOTE — PROGRESS NOTES
Department of Psychiatry  Attending Progress Note    Chief Complaint: Severe major depression with psychotic features ([address]     SUBJECTIVE:  Irish López is seen in the dayroom today. She is brightened upon approach and states she feels like she is slowly improving. She is still depressed and anxious at times. She states she did sleep better last night and has continued to not have nightmares. She is attending groups and is social with peers. She feels helpless and hopeless at times. Feels like medications are starting to work, but she does not feel happy quite yet. Chart and medications reviewed. At this time there is no safe alternative other than inpatient care. OBJECTIVE    Physical  /65   Pulse 72   Temp 97.8 °F (36.6 °C) (Oral)   Resp 14   Ht 5' 2\" (1.575 m)   Wt 206 lb (93.4 kg)   LMP  (Exact Date)   SpO2 100%   BMI 37.68 kg/m²      Mental Status Evaluation:  Orientation: alertness: alert   Mood:. anxious and depressed but better      Affect:  Blunted, but a little more reactive than previously      Appearance:  casually dressed   Activity:  No psychomotor agitation observed   Gait/Posture: Normal   Speech:  normal pitch and normal volume   Thought Process:  circumstantial   Thought Content:   Within normal limits   Sensorium:  person, place and time/date   Cognition:  grossly intact   Memory: intact   Insight:  fair   Judgment: fair   Suicidal Ideations: Denies suicidal thoughts   Homicidal Ideations: Negative for homicidal ideation      Medication Side Effects: absent       Attention Span attention span appeared shorter than expected for age     Medications  Current Facility-Administered Medications   Medication Dose Route Frequency Provider Last Rate Last Dose    traZODone (DESYREL) tablet 100 mg  100 mg Oral Nightly PRN TERRI Roque CNP   100 mg at 05/30/19 2129    pantoprazole (PROTONIX) tablet 40 mg  40 mg Oral JIMENA Lawson MD   40 mg at 05/31/19 0635    sucralfate (CARAFATE) tablet 1 g  1 g Oral 4 times per day Sofia Ledesma MD   1 g at 05/31/19 1246    acetaminophen (TYLENOL) tablet 650 mg  650 mg Oral Q4H PRN TERRI Farrell CNP        melatonin ER tablet 1 mg  1 mg Oral Nightly PRN TERRI Farrell - CNP   1 mg at 05/30/19 2129    prazosin (MINIPRESS) capsule 2 mg  2 mg Oral Nightly Ambrose Hodgson APRN - CNP   2 mg at 05/30/19 2129    hydrOXYzine (ATARAX) tablet 50 mg  50 mg Oral TID PRN Shey ADILIA TorresN - CNP   50 mg at 05/31/19 1248    ondansetron (ZOFRAN-ODT) disintegrating tablet 4 mg  4 mg Oral Q8H PRN Shey Torres, APRN - CNP        buPROPion [facility]) tablet 100 mg  100 mg Oral Daily TERRI Ryan - CNP   100 mg at 05/31/19 9405    sertraline (ZOLOFT) tablet 150 mg  150 mg Oral Daily Ambrosejaci Hodgson, APRN - CNP   150 mg at 05/31/19 0994    busPIRone (BUSPAR) tablet 10 mg  10 mg Oral TID Ambrose Hodgson APRN - CNP   10 mg at 05/31/19 1248    QUEtiapine (SEROQUEL) tablet 200 mg  200 mg Oral Nightly Ambrose Hodgson APRN - CNP   200 mg at 05/30/19 2129    rOPINIRole (REQUIP) tablet 2 mg  2 mg Oral Nightly Ambrosejaci Hodgson APRN - CNP   2 mg at 05/30/19 2129    benztropine mesylate (COGENTIN) injection 2 mg  2 mg Intramuscular BID PRN Concord Luceroes, APRN - CNP        magnesium hydroxide (MILK OF MAGNESIA) 400 MG/5ML suspension 30 mL  30 mL Oral Daily PRN Concord Ropes, APRN - CNP        aluminum & magnesium hydroxide-simethicone (MAALOX) 200-200-20 MG/5ML suspension 30 mL  30 mL Oral Q6H PRN Concord Melissa, APRN - CNP   30 mL at 05/27/19 2114         pantoprazole  40 mg Oral QAM AC    sucralfate  1 g Oral 4 times per day    prazosin  2 mg Oral Nightly    buPROPion  100 mg Oral Daily    sertraline  150 mg Oral Daily    busPIRone  10 mg Oral TID    QUEtiapine  200 mg Oral Nightly    rOPINIRole  2 mg Oral Nightly       ASSESSMENT  Severe major depression with psychotic features ([address]     Patient's Response to Treatment: positive    PLAN    · Supportive therapy with medication management. Reviewed risks and benefits as well as potential side effects with patient. · Seroquel increased 300 mg on 5/31   · Zofran 4 mg Q8H PRN for nausea and vomiting. · New Order - Prazosin 2mg @ HS for nightmares beginning 5/27/19. · New order- Melatonin Er 1mg PO QHS PRN 5/29/19  · Titrate medications to effect. · Therapeutic activities and groups  · Follow up at [facility] after symptoms stabilized  · Internal medicine consult for acid reflux with nausea and vomiting. · Discharge planning with social work.        Electronically signed by TERRI Rinaldi CNP on 5/31/2019 at 1:26 PM.

## 2019-06-01 LAB
ALBUMIN SERPL-MCNC: 3.9 G/DL (ref 3.5–5.2)
ALBUMIN/GLOBULIN RATIO: ABNORMAL (ref 1–2.5)
ALP BLD-CCNC: 92 U/L (ref 35–104)
ALPHA-1 ANTITRYPSIN: 126 MG/DL (ref 90–200)
ALT SERPL-CCNC: 113 U/L (ref 5–33)
AST SERPL-CCNC: 77 U/L
BILIRUB SERPL-MCNC: 0.37 MG/DL (ref 0.3–1.2)
BILIRUBIN DIRECT: 0.13 MG/DL
BILIRUBIN, INDIRECT: 0.24 MG/DL (ref 0–1)
CERULOPLASMIN: 26 MG/DL (ref 16–45)
FERRITIN: 216 UG/L (ref 13–150)
GLOBULIN: ABNORMAL G/DL (ref 1.5–3.8)
IRON SATURATION: 28 % (ref 20–55)
IRON: 80 UG/DL (ref 37–145)
TOTAL IRON BINDING CAPACITY: 286 UG/DL (ref 250–450)
TOTAL PROTEIN: 6.8 G/DL (ref 6.4–8.3)
UNSATURATED IRON BINDING CAPACITY: 206 UG/DL (ref 112–347)

## 2019-06-01 PROCEDURE — 99232 SBSQ HOSP IP/OBS MODERATE 35: CPT | Performed by: INTERNAL MEDICINE

## 2019-06-01 PROCEDURE — 83516 IMMUNOASSAY NONANTIBODY: CPT

## 2019-06-01 PROCEDURE — 86665 EPSTEIN-BARR CAPSID VCA: CPT

## 2019-06-01 PROCEDURE — 87902 NFCT AGT GNTYP ALYS HEP C: CPT

## 2019-06-01 PROCEDURE — 6370000000 HC RX 637 (ALT 250 FOR IP): Performed by: NURSE PRACTITIONER

## 2019-06-01 PROCEDURE — 6370000000 HC RX 637 (ALT 250 FOR IP): Performed by: INTERNAL MEDICINE

## 2019-06-01 PROCEDURE — 83550 IRON BINDING TEST: CPT

## 2019-06-01 PROCEDURE — 86664 EPSTEIN-BARR NUCLEAR ANTIGEN: CPT

## 2019-06-01 PROCEDURE — APPNB30 APP NON BILLABLE TIME 0-30 MINS: Performed by: NURSE PRACTITIONER

## 2019-06-01 PROCEDURE — 86038 ANTINUCLEAR ANTIBODIES: CPT

## 2019-06-01 PROCEDURE — 99232 SBSQ HOSP IP/OBS MODERATE 35: CPT | Performed by: NURSE PRACTITIONER

## 2019-06-01 PROCEDURE — 82103 ALPHA-1-ANTITRYPSIN TOTAL: CPT

## 2019-06-01 PROCEDURE — 86663 EPSTEIN-BARR ANTIBODY: CPT

## 2019-06-01 PROCEDURE — 83540 ASSAY OF IRON: CPT

## 2019-06-01 PROCEDURE — 86645 CMV ANTIBODY IGM: CPT

## 2019-06-01 PROCEDURE — 82728 ASSAY OF FERRITIN: CPT

## 2019-06-01 PROCEDURE — 87522 HEPATITIS C REVRS TRNSCRPJ: CPT

## 2019-06-01 PROCEDURE — 80076 HEPATIC FUNCTION PANEL: CPT

## 2019-06-01 PROCEDURE — 82390 ASSAY OF CERULOPLASMIN: CPT

## 2019-06-01 PROCEDURE — 36415 COLL VENOUS BLD VENIPUNCTURE: CPT

## 2019-06-01 PROCEDURE — 1240000000 HC EMOTIONAL WELLNESS R&B

## 2019-06-01 RX ORDER — SERTRALINE HYDROCHLORIDE 100 MG/1
200 TABLET, FILM COATED ORAL DAILY
Status: DISCONTINUED | OUTPATIENT
Start: 2019-06-02 | End: 2019-06-06 | Stop reason: HOSPADM

## 2019-06-01 RX ORDER — BUSPIRONE HYDROCHLORIDE 10 MG/1
15 TABLET ORAL 3 TIMES DAILY
Status: DISCONTINUED | OUTPATIENT
Start: 2019-06-01 | End: 2019-06-06 | Stop reason: HOSPADM

## 2019-06-01 RX ADMIN — PRAZOSIN HYDROCHLORIDE 2 MG: 1 CAPSULE ORAL at 20:24

## 2019-06-01 RX ADMIN — SUCRALFATE 1 G: 1 TABLET ORAL at 06:12

## 2019-06-01 RX ADMIN — TRAZODONE HYDROCHLORIDE 100 MG: 100 TABLET ORAL at 20:23

## 2019-06-01 RX ADMIN — BUPROPION HYDROCHLORIDE 100 MG: 100 TABLET, FILM COATED ORAL at 08:34

## 2019-06-01 RX ADMIN — SERTRALINE HYDROCHLORIDE 150 MG: 100 TABLET ORAL at 08:33

## 2019-06-01 RX ADMIN — BUSPIRONE HYDROCHLORIDE 10 MG: 10 TABLET ORAL at 08:33

## 2019-06-01 RX ADMIN — PANTOPRAZOLE SODIUM 40 MG: 40 TABLET, DELAYED RELEASE ORAL at 06:12

## 2019-06-01 RX ADMIN — BUSPIRONE HYDROCHLORIDE 15 MG: 15 TABLET ORAL at 20:24

## 2019-06-01 RX ADMIN — ROPINIROLE HYDROCHLORIDE 2 MG: 2 TABLET, FILM COATED ORAL at 20:23

## 2019-06-01 RX ADMIN — SUCRALFATE 1 G: 1 TABLET ORAL at 12:51

## 2019-06-01 RX ADMIN — SUCRALFATE 1 G: 1 TABLET ORAL at 18:09

## 2019-06-01 RX ADMIN — HYDROXYZINE HYDROCHLORIDE 50 MG: 50 TABLET, FILM COATED ORAL at 13:32

## 2019-06-01 RX ADMIN — QUETIAPINE FUMARATE 300 MG: 300 TABLET ORAL at 20:23

## 2019-06-01 RX ADMIN — BUSPIRONE HYDROCHLORIDE 10 MG: 10 TABLET ORAL at 13:32

## 2019-06-01 RX ADMIN — Medication 1 MG: at 20:24

## 2019-06-01 RX ADMIN — HYDROXYZINE HYDROCHLORIDE 50 MG: 50 TABLET, FILM COATED ORAL at 20:23

## 2019-06-01 NOTE — PLAN OF CARE
Problem: Altered Mood, Depressive Behavior:  Goal: Able to verbalize acceptance of life and situations over which he or she has no control  Description  Able to verbalize acceptance of life and situations over which he or she has no control  6/1/2019 0139 by Rayo Duran RN  Outcome: Ongoing  Note:   Pt continues to be depressed, hopeless, helpless. Pt is non-relating of her issues and not motivated for discharge planning. Pt continues to complain of high anxiety but is unable to identify any effective coping mechanisms. Pt attends [facility] therapeutic group + is accepting of HS snack. PT is medication compliant. Pt safety maintained per safety checks every 15 minutes and irregular rounding. Problem: Altered Mood, Depressive Behavior:  Goal: Ability to disclose and discuss suicidal ideas will improve  Description  Ability to disclose and discuss suicidal ideas will improve  6/1/2019 0139 by Rayo Duran RN  Outcome: Ongoing  Note:   Pt denies SI/HI - no self harm has occurred. Pt states she feels safe in the hospital and will not harm self. Pt continues to be depressed and anxious, open during talk time with writer. Pt is social in the milieu with select peers. Pt safety maintained per safety checks every 15 minutes and irregular rounding.

## 2019-06-01 NOTE — GROUP NOTE
Group Therapy Note    Date: June 1    Group Start Time: 1330  Group End Time: 1430  Group Topic: Cognitive Skills    RIMMA Gonzalez, CTRS        Group Therapy Note    Attendees: 9         Patient's Goal:   Patient will demonstrate increased interpersonal interaction     Notes:  Patient attended group and participated fully     Status After Intervention:  Improved    Participation Level:  Active Listener and Interactive    Participation Quality: Appropriate, Attentive and Sharing      Speech:  normal      Thought Process/Content: Logical      Affective Functioning: Congruent      Mood: euthymic      Level of consciousness:  Alert and Oriented x4      Response to Learning: Progressing to goal      Endings: None Reported    Modes of Intervention: Socialization, Problem-solving and Activity      Discipline Responsible: Psychoeducational Specialist      Signature:  Maranda Herring

## 2019-06-01 NOTE — PROGRESS NOTES
U/L 113 (H)   AST Latest Ref Range: <32 U/L 77 (H)   Bilirubin Latest Ref Range: 0.3 - 1.2 mg/dL 0.37   Bilirubin, Direct Latest Ref Range: <0.31 mg/dL 0.13   Bilirubin, Indirect Latest Ref Range: 0.00 - 1.00 mg/dL 0.24   Ceruloplasmin Latest Ref Range: 16 - 45 mg/dL 26     Medications  Current Facility-Administered Medications   Medication Dose Route Frequency Provider Last Rate Last Dose    [START ON 6/2/2019] sertraline (ZOLOFT) tablet 200 mg  200 mg Oral Daily Rory Guevara RN, NP        busPIRone (BUSPAR) tablet 15 mg  15 mg Oral TID Rory Guevara RN, NP        QUEtiapine (SEROQUEL) tablet 300 mg  300 mg Oral Nightly Cristhian Se, APRN - CNP   300 mg at 05/31/19 2106    traZODone (DESYREL) tablet 100 mg  100 mg Oral Nightly PRN Cristhian Se, APRN - CNP   100 mg at 05/31/19 2106    pantoprazole (PROTONIX) tablet 40 mg  40 mg Oral QAM AC Chapito Matute MD   40 mg at 06/01/19 0612    sucralfate (CARAFATE) tablet 1 g  1 g Oral 4 times per day Chapito Matute MD   1 g at 06/01/19 1251    acetaminophen (TYLENOL) tablet 650 mg  650 mg Oral Q4H PRN Cristhian Se, APRN - CNP        melatonin ER tablet 1 mg  1 mg Oral Nightly PRN Cristhian Se, APRN - CNP   1 mg at 05/31/19 2106    prazosin (MINIPRESS) capsule 2 mg  2 mg Oral Nightly Anup Beath, APRN - CNP   2 mg at 05/31/19 2106    hydrOXYzine (ATARAX) tablet 50 mg  50 mg Oral TID PRN Gayleen Ravi, APRN - CNP   50 mg at 06/01/19 1332    ondansetron (ZOFRAN-ODT) disintegrating tablet 4 mg  4 mg Oral Q8H PRN Gayleen Ravi, APRN - CNP        buPROPion [facility]) tablet 100 mg  100 mg Oral Daily Anup Beath, APRN - CNP   100 mg at 06/01/19 0834    rOPINIRole (REQUIP) tablet 2 mg  2 mg Oral Nightly TERRI Cooper CNP   2 mg at 05/31/19 2106    benztropine mesylate (COGENTIN) injection 2 mg  2 mg Intramuscular BID PRN TERRI Briggs - CNP        magnesium hydroxide (MILK OF MAGNESIA) 400 MG/5ML suspension 30 mL  30 mL

## 2019-06-01 NOTE — GROUP NOTE
Group Therapy Note    Date: June 1    Group Start Time: 1000  Group End Time: 4292  Group Topic: Psychoeducation    RIMMA SYLVESTER    MALENA Monreal        Group Therapy Note    Attendees: 3/18         Patient's Goal:  Able to verbalize acceptance of life and situations over which he or she has no control. Notes:  Pt actively listened and engaged during group discussion. Status After Intervention:  Improved    Participation Level:  Active Listener and Interactive    Participation Quality: Appropriate, Attentive, Sharing and Supportive      Speech:  normal      Thought Process/Content: Logical      Affective Functioning: Congruent      Mood: euthymic      Level of consciousness:  Alert, Oriented x4 and Attentive      Response to Learning: Able to verbalize current knowledge/experience, Able to verbalize/acknowledge new learning, Able to retain information, Capable of insight and Progressing to goal      Endings: None Reported    Modes of Intervention: Education, Support, Socialization, Clarifying, Problem-solving, Limit-setting and Reality-testing      Discipline Responsible: /Counselor      Signature:  MALENA Monreal

## 2019-06-01 NOTE — PROGRESS NOTES
[facility]    Gastroenterology Daily Progress Note      Patient:   Meredith Hightower   :    1960   Facility:   [address] [place] [address]  Date:     2019  Consultant:   Susan Mills CNP      SUBJECTIVE  59 y.o. female admitted 2019 with Major depressive disorder, recurrent ([facility] [F33.9]  Major depression, recurrent ([facility] [F33.9] and seen for dysphagia, nausea with vomiting and abdominal pain. The pt was seen and examined. She continues to have dysphagia with food and nausea with emesis despite medication. She states she had blood in her ostomy last night that is now resolved. Her esophagram showed reflux with delayed peristalsis and mucosal irregularity in the mid esophagus. She is also noted to have a positive hepatitis C antibody with elevated LFT's on . She denies IVDA and states she was diagnosed within the last year. She has not been treated. Her AFP is normal.         OBJECTIVE  Scheduled Meds:   QUEtiapine  300 mg Oral Nightly    pantoprazole  40 mg Oral QAM AC    sucralfate  1 g Oral 4 times per day    prazosin  2 mg Oral Nightly    buPROPion  100 mg Oral Daily    sertraline  150 mg Oral Daily    busPIRone  10 mg Oral TID    rOPINIRole  2 mg Oral Nightly       Vital Signs:  /68   Pulse 69   Temp 97.6 °F (36.4 °C) (Oral)   Resp 14   Ht 5' 2\" (1.575 m)   Wt 206 lb (93.4 kg)   LMP  (Exact Date)   SpO2 100%   BMI 37.68 kg/m²      Physical Exam:   General appearance: Alert & oriented, NAD  Lungs: CTA bilaterally    Heart: S1S2 RRR  Abdomen: Soft, Nontender, Not distended, BS WNL obese colostomy with liquid light brown stool  Skin: No jaundice, No clubbing, No cyanosis    Lab and Imaging Review     Results for Carlotta Mobley (MRN [id number]) as of 2019 09:25   Ref.  Range 2019 06:26   Hep A IgM Latest Ref Range: NONREACTIVE  NONREACTIVE   Hepatitis B Surface Ag Latest Ref Range: NONREACTIVE  NONREACTIVE   Hepatitis C Ab Latest Ref Range: NONREACTIVE  Underlying neoplasm is not entirely   excluded.       A radiopaque pill was ingested and sticks in this area.  Subsequent ingestion   of liquid clears the tablet.       Reflux is evident.           Impression   Some mucosal irregularity mid esophagus possibly sequela of longstanding   reflux.  At this site, the barium tablet sticks.  Consider endoscopic   evaluation to exclude early neoplasm.           ASSESSMENT/PLAN:  1. Dysphagia with food not improved with medication  - discussed endoscopy with Dr Chico Bacon will plan for egd on Monday  , continue the ppi and carafate    2. Positive hepatitis C antibody in blood  -will order lft' for am  -viral load and pcr, autoimmune workup, liver US ordered  -will need outpatient follow up    3. Hx colon cancer s/p ileostomy      This plan was formulated in collaboration with  . Electronically signed by: TERRI Carreon CNP on 6/1/2019 at 9:25 AM   Attending Physician Statement  I have discussed the care of Gilford Reading and   I have examined the patient myselft and taken ros and hpi , including pertinent history and exam findings,  with the author of this note . I have reviewed the key elements of all parts of the encounter with the nurse practitioner/resident.     I agree with the assessment, plan and orders as documented by the above health care provider     Electronically signed by Fadumo Peguero MD

## 2019-06-01 NOTE — PLAN OF CARE
Problem: Altered Mood, Depressive Behavior:  Goal: Ability to disclose and discuss suicidal ideas will improve  Description  Ability to disclose and discuss suicidal ideas will improve  6/1/2019 1048 by Lakshmi Aceves RN  Outcome: Ongoing     Patient denies any thoughts of self harm. Denies hallucinations. Social in day area. Attends groups. 15 minute checks maintained for safety.

## 2019-06-01 NOTE — GROUP NOTE
Group Therapy Note    Date: June 1    Group Start Time: 1100  Group End Time: 1140  Group Topic: Healthy Living/Wellness    [facility]    April M Mackenzie Limon RN        Group Therapy Note    Attendees: 10       Patient's Goal:  To participate in group. Notes:  Topic: The Cycle of Anxiety    Status After Intervention:  Improved    Participation Level:  Active Listener and Interactive    Participation Quality: Appropriate, Attentive, Sharing and Supportive      Speech:  normal      Thought Process/Content: Logical      Affective Functioning: Congruent      Mood: anxious      Level of consciousness:  Alert, Oriented x4 and Attentive      Response to Learning: Able to verbalize current knowledge/experience, Able to verbalize/acknowledge new learning, Able to retain information and Capable of insight      Endings: None Reported    Modes of Intervention: Education      Discipline Responsible: Registered Nurse      Signature:  Stephie Castro RN

## 2019-06-02 PROCEDURE — APPNB30 APP NON BILLABLE TIME 0-30 MINS: Performed by: NURSE PRACTITIONER

## 2019-06-02 PROCEDURE — 6370000000 HC RX 637 (ALT 250 FOR IP): Performed by: NURSE PRACTITIONER

## 2019-06-02 PROCEDURE — 99232 SBSQ HOSP IP/OBS MODERATE 35: CPT | Performed by: INTERNAL MEDICINE

## 2019-06-02 PROCEDURE — 99232 SBSQ HOSP IP/OBS MODERATE 35: CPT | Performed by: NURSE PRACTITIONER

## 2019-06-02 PROCEDURE — 1240000000 HC EMOTIONAL WELLNESS R&B

## 2019-06-02 PROCEDURE — 6370000000 HC RX 637 (ALT 250 FOR IP): Performed by: INTERNAL MEDICINE

## 2019-06-02 RX ORDER — LORAZEPAM 0.5 MG/1
0.5 TABLET ORAL EVERY 6 HOURS PRN
Status: DISPENSED | OUTPATIENT
Start: 2019-06-02 | End: 2019-06-04

## 2019-06-02 RX ADMIN — BUPROPION HYDROCHLORIDE 100 MG: 100 TABLET, FILM COATED ORAL at 08:33

## 2019-06-02 RX ADMIN — BUSPIRONE HYDROCHLORIDE 15 MG: 15 TABLET ORAL at 08:33

## 2019-06-02 RX ADMIN — HYDROXYZINE HYDROCHLORIDE 50 MG: 50 TABLET, FILM COATED ORAL at 10:41

## 2019-06-02 RX ADMIN — QUETIAPINE FUMARATE 300 MG: 300 TABLET ORAL at 21:49

## 2019-06-02 RX ADMIN — TRAZODONE HYDROCHLORIDE 100 MG: 100 TABLET ORAL at 21:49

## 2019-06-02 RX ADMIN — SUCRALFATE 1 G: 1 TABLET ORAL at 11:51

## 2019-06-02 RX ADMIN — ROPINIROLE HYDROCHLORIDE 2 MG: 2 TABLET, FILM COATED ORAL at 21:49

## 2019-06-02 RX ADMIN — BUSPIRONE HYDROCHLORIDE 15 MG: 15 TABLET ORAL at 15:35

## 2019-06-02 RX ADMIN — Medication 1 MG: at 21:49

## 2019-06-02 RX ADMIN — SUCRALFATE 1 G: 1 TABLET ORAL at 19:03

## 2019-06-02 RX ADMIN — SERTRALINE 200 MG: 100 TABLET, FILM COATED ORAL at 08:33

## 2019-06-02 RX ADMIN — PANTOPRAZOLE SODIUM 40 MG: 40 TABLET, DELAYED RELEASE ORAL at 08:34

## 2019-06-02 RX ADMIN — PRAZOSIN HYDROCHLORIDE 2 MG: 1 CAPSULE ORAL at 21:49

## 2019-06-02 RX ADMIN — LORAZEPAM 0.5 MG: 0.5 TABLET ORAL at 19:43

## 2019-06-02 RX ADMIN — HYDROXYZINE HYDROCHLORIDE 50 MG: 50 TABLET, FILM COATED ORAL at 21:49

## 2019-06-02 RX ADMIN — LORAZEPAM 0.5 MG: 0.5 TABLET ORAL at 11:50

## 2019-06-02 RX ADMIN — SUCRALFATE 1 G: 1 TABLET ORAL at 08:34

## 2019-06-02 RX ADMIN — BUSPIRONE HYDROCHLORIDE 15 MG: 15 TABLET ORAL at 21:50

## 2019-06-02 ASSESSMENT — PAIN DESCRIPTION - LOCATION: LOCATION: ARM

## 2019-06-02 ASSESSMENT — PAIN SCALES - GENERAL: PAINLEVEL_OUTOF10: 6

## 2019-06-02 ASSESSMENT — PAIN DESCRIPTION - ORIENTATION: ORIENTATION: LEFT

## 2019-06-02 NOTE — GROUP NOTE
Group Therapy Note    Date: June 1    Group Start Time: 2030  Group End Time: 2128  Group Topic: Wrap-Up    RIMMA Sanabria        Group Therapy Note    Attendees: 12         Patient's Goal:  Issues with her physical health     Notes: Working on relieving her anxiety regarding new of further testing    Status After Intervention:  Improved    Participation Level:  Active Listener    Participation Quality: Supportive      Speech:  normal      Thought Process/Content: Logical      Affective Functioning: Congruent      Mood: dysphoric      Level of consciousness:  Alert      Response to Learning: Capable of insight      Endings: None Reported    Modes of Intervention: Problem-solving      Discipline Responsible: [employer]      Signature:  Rancho Sanabria

## 2019-06-02 NOTE — PLAN OF CARE
Problem: Altered Mood, Depressive Behavior:  Goal: Able to verbalize acceptance of life and situations over which he or she has no control  Description  Able to verbalize acceptance of life and situations over which he or she has no control  6/2/2019 1059 by Zora Alexis RN  Outcome: Ongoing  Patient is alert, observed in day room. Patient is currently denying suicidal/homicidal thoughts, but is admitting to increase anxiety, tearful. NP spoke with patient about having increase anxiety. NP ordered Ativan 0.5 mg x 2 days, pt refused and stated \"it's ineffective, I'm not taking that. \" Patient agreed to take PRN Atarax. Patient reports adequate sleep and appetite. Patient takes all scheduled medications without concern, denies any side effects. Patient is social with select peers, attends some unit programming. Will continue to reinforce, monitor and ensure safety.

## 2019-06-02 NOTE — PROGRESS NOTES
[facility]    Gastroenterology Daily Progress Note      Patient:   Corey Mcallister   :    1960   Facility:   [address] [place] [address]   Date:     2019  Consultant:   Nato Marquez CNP      SUBJECTIVE  59 y.o. female admitted 2019 with Major depressive disorder, recurrent ([facility] [F33.9]  Major depression, recurrent ([facility] [F33.9] and seen for elevated lft's, hepatitis c and dysphagia. The pt was seen and examined. LFT's remain high; her liver US has not been completed yet. Her autoimmune testing is pending. She continues to have dysphagia with food and did have nausea last night but no abdominal pain. No reports of further bleeding in the colostomy bag. OBJECTIVE  Scheduled Meds:   sertraline  200 mg Oral Daily    busPIRone  15 mg Oral TID    QUEtiapine  300 mg Oral Nightly    pantoprazole  40 mg Oral QAM AC    sucralfate  1 g Oral 4 times per day    prazosin  2 mg Oral Nightly    buPROPion  100 mg Oral Daily    rOPINIRole  2 mg Oral Nightly       Vital Signs:  /71   Pulse 70   Temp 98.1 °F (36.7 °C) (Oral)   Resp 14   Ht 5' 2\" (1.575 m)   Wt 206 lb (93.4 kg)   LMP  (Exact Date)   SpO2 100%   BMI 37.68 kg/m²      Physical Exam:   General appearance: Alert & oriented, NAD  Lungs: CTA bilaterally    Heart: S1S2 RRR  Abdomen: Soft, Nontender, Not distended, BS WNL obese colostomy present  Skin: No jaundice, No clubbing, No cyanosis    Lab and Imaging Review       LFTS  Recent Labs     19  1049   ALKPHOS 92   *   AST 77*   PROT 6.8   BILITOT 0.37   BILIDIR 0.13   LABALBU 3.9   Results for Rojas Dubois (MRN [id number]) as of 2019 09:34   Ref.  Range 2019 06:26   Hep A IgM Latest Ref Range: NONREACTIVE  NONREACTIVE   Hepatitis B Surface Ag Latest Ref Range: NONREACTIVE  NONREACTIVE   Hepatitis C Ab Latest Ref Range: NONREACTIVE  REACTIVE (A)   Hep B Core Ab, IgM Latest Ref Range: NONREACTIVE  NONREACTIVE   Results for Rojas Dubois (MRN [id number]) as of 6/2/2019 09:34   Ref. Range 6/1/2019 10:49   A-1 Antitrypsin Latest Ref Range: 90 - 200 mg/dL 126   Results for Kristin Rhoades (MRN [id number]) as of 6/2/2019 09:34   Ref. Range 6/1/2019 10:49   Ceruloplasmin Latest Ref Range: 16 - 45 mg/dL 26   Results for Kristin Rhoades (MRN [id number]) as of 6/2/2019 09:34   Ref. Range 4/4/2019 06:26   AFP (Alpha Fetoprotein) Latest Ref Range: <8.4 ug/L 5.1     ANEMIA STUDIES  Recent Labs     06/01/19  1049   LABIRON 28   TIBC 286   FERRITIN 216*     FINDINGS:ct abd 4/2/19   Lower Chest: Lung bases are clear.  Heart is not enlarged.  No pericardial   effusion.       Organs: No suspicious hepatic lesions.  Lobulated fluid density lesion in   hepatic segment 6 is most likely a cyst and requires no additional imaging   follow-up.  Cholecystectomy clips.  Normal spleen, pancreas, adrenal glands,   and right kidney.  There is left-sided hydronephrosis.  A surgical clip is in   close proximity to the left ureter and there is a possible 2-3 mm   nonobstructing calculus in the distal left ureter (series 2, image 130.       GI/Bowel: Postoperative changes from bowel resection with anastomosis.  No   dilated bowel.  A right lower quadrant colostomy is present.  Subtotal   colectomy evident.  There is no evidence of mass.  Minimal hiatal hernia.       Pelvis: Absent uterus.  Bladder is normal for degree of distention.       Peritoneum/Retroperitoneum: Atherosclerotic changes of aorta.  No   lymphadenopathy.  No free fluid.  No free air.       Bones/Soft Tissues: Atrophy of the rectus musculature.  Right lower quadrant   colostomy.  Mild pelvic muscle atrophy.  No fluid collections in the   abdominal wall.  Multilevel degenerative changes with endplate irregularity   of L2 suspicious of age-indeterminate compression deformity.           Impression   1. Left-sided hydronephrosis with associated 2-3 mm distal left ureteral   calculus. 2. Status post colectomy with right lower quadrant colostomy. 3. Minimal hiatal hernia.             FINDINGS:esophagram 5/30/19   Presbyesophagus with tertiary contractions.  An area of delayed peristalsis   and mucosal irregularity at the mid esophagus may represent sequela of   esophagitis or developing stricture.  Underlying neoplasm is not entirely   excluded.       A radiopaque pill was ingested and sticks in this area.  Subsequent ingestion   of liquid clears the tablet.       Reflux is evident.           Impression   Some mucosal irregularity mid esophagus possibly sequela of longstanding   reflux.  At this site, the barium tablet sticks.  Consider endoscopic   evaluation to exclude early neoplasm.             ASSESSMENT/PLAN:  1. Dysphagia with food  -will plan for EGD tomorrow, npo after midnight    2. Elevated lft's with positive hepatitis C antibody in blood  -await results of liver US and autoimmune testing  -hep C genotyping and viral load pending  -recheck lft and inr in am    3. Hx colon cancer s/p ileostomy      This plan was formulated in collaboration with Dr. Jalen Campos . Electronically signed by: TERRI Crow CNP on 6/2/2019 at 9:35 AM     Attending Physician Statement  I have discussed the care of [address] [place] [address] and   I have examined the patient myselft and taken ros and hpi , including pertinent history and exam findings,  with the author of this note . I have reviewed the key elements of all parts of the encounter with the nurse practitioner/resident.     I agree with the assessment, plan and orders as documented by the above health care provider     Electronically signed by Cassidy Callaway MD

## 2019-06-02 NOTE — GROUP NOTE
Group Therapy Note    Date: June 2    Group Start Time: 0900  Group End Time: 0920  Group Topic: Community Meeting    [address] [facility] WILTON Jones        Group Therapy Note    Attendees: 5/22         Patient's Goal:  To demonstrate increased interpersonal interaction. Notes:  Pt attended and participated in group. Status After Intervention:  Improved    Participation Level:  Active Listener and Interactive    Participation Quality: Appropriate, Attentive and Sharing      Speech:  pressured      Thought Process/Content: Linear      Affective Functioning: Blunted      Mood: anxious      Level of consciousness:  Alert and Attentive      Response to Learning: Progressing to goal      Endings: None Reported    Modes of Intervention: Education, Support, Socialization, Exploration, Problem-solving and Reality-testing      Discipline Responsible: Psychoeducational Specialist      Signature:  Kailyn Jones

## 2019-06-02 NOTE — GROUP NOTE
Group Therapy Note    Date: June 2    Group Start Time: 1600  Group End Time: 9305  Group Topic: Recovery    RIMMA White        Group Therapy Note    Attendees: 16/22       Patient's Goal:  Be able to verbalize coping skills for recovery      Status After Intervention:  Improved    Participation Level: Active Listener    Participation Quality: Appropriate      Speech:  normal      Thought Process/Content: Logical      Affective Functioning: Congruent      Mood: stable      Level of consciousness:  Alert      Response to Learning: Progressing to goal      Endings: None Reported    Modes of Intervention: Education      Discipline Responsible: Behavorial [employer]      Signature:   Pratibha White

## 2019-06-02 NOTE — GROUP NOTE
Group Therapy Note    Date: June 2    Group Start Time: 1330  Group End Time: 6069  Group Topic: Cognitive Skills    [facility] WILTON Saleem [place], [place]        Group Therapy Note    Attendees: 9/21         Patient's Goal:  To demonstrate increased interpersonal interaction. Notes:  Pt attended and participated in group. Status After Intervention:  Improved    Participation Level:  Active Listener and Interactive    Participation Quality: Appropriate, Attentive and Sharing      Speech:  normal      Thought Process/Content: Logical      Affective Functioning: Congruent      Mood: euthymic      Level of consciousness:  Alert and Attentive      Response to Learning: Progressing to goal      Endings: None Reported    Modes of Intervention: Socialization, Problem-solving, Activity and Reality-testing      Discipline Responsible: Psychoeducational Specialist      Signature:  Areli Serrano

## 2019-06-02 NOTE — PLAN OF CARE
Problem: Altered Mood, Depressive Behavior:  Goal: Able to verbalize acceptance of life and situations over which he or she has no control  Description  Able to verbalize acceptance of life and situations over which he or she has no control  Outcome: Ongoing     Problem: Altered Mood, Depressive Behavior:  Goal: Ability to disclose and discuss suicidal ideas will improve  Description  Ability to disclose and discuss suicidal ideas will improve  6/1/2019 2235 by Cyd Dubin  Outcome: Ongoing   Patient denies all. Able to discuss improvement of si. Patient is able to verbalize working on acceptance of life and situations she has no control over.

## 2019-06-02 NOTE — GROUP NOTE
Group Therapy Note    Date: June 2    Group Start Time: 1000  Group End Time: 1030  Group Topic: Psychoeducation    STNELY Teresa, MALENA        Group Therapy Note    Patient refused to attend Psychoeducational group at 10:00am after encouragement from staff. 1:1 talk time offered; but refused.      Signature:  NELY Viveros, MALENA

## 2019-06-02 NOTE — PROGRESS NOTES
Department of Psychiatry  Attending Progress Note    Chief Complaint: Severe major depression with psychotic features ([address]     SUBJECTIVE:  Met with Sonia Hawthorne one-to-one in the milieu. Patient continues to be very anxious and ruminating regarding thoughts of upcoming upper GI. Is ruminating on the idea that this will be a relapse in her cancer and needing further treatment. Patient is requesting something for short-term anxiety as she is seen often bouncing on legs and wringing of hands. Chart and medications reviewed. At this time there is no safe alternative other than inpatient care. OBJECTIVE    Physical  /71   Pulse 70   Temp 98.1 °F (36.7 °C) (Oral)   Resp 14   Ht 5' 2\" (1.575 m)   Wt 206 lb (93.4 kg)   LMP  (Exact Date)   SpO2 100%   BMI 37.68 kg/m²      Mental Status Evaluation:  Orientation: alertness: alert   Mood:. anxious and depressed but better      Affect:  Blunted, but a little more reactive than previously      Appearance:  casually dressed   Activity:  psychomotor agitation observed legs bouncing and ringing of hands   Gait/Posture: Normal   Speech:  WNL but would be elevated at times   Thought Process:  circumstantial   Thought Content: Within normal limits   Sensorium:  person, place and time/date   Cognition:  grossly intact   Memory: intact   Insight:  fair   Judgment: fair   Suicidal Ideations: Denies suicidal thoughts   Homicidal Ideations: Negative for homicidal ideation      Medication Side Effects: absent       Attention Span attention span appeared shorter than expected for age       Labs:Results for Apolinar Sportsman (MRN [id number])    Ref.  Range 6/1/2019 10:49   Albumin/Globulin Ratio Latest Ref Range: 1.0 - 2.5  NOT REPORTED   Total Protein Latest Ref Range: 6.4 - 8.3 g/dL 6.8   A-1 Antitrypsin Latest Ref Range: 90 - 200 mg/dL 126   Albumin Latest Ref Range: 3.5 - 5.2 g/dL 3.9   Globulin Latest Ref Range: 1.5 - 3.8 g/dL NOT REPORTED   Alk Phos Latest Ref Range: 35 - 104 U/L 92   ALT Latest Ref Range: 5 - 33 U/L 113 (H)   AST Latest Ref Range: <32 U/L 77 (H)   Bilirubin Latest Ref Range: 0.3 - 1.2 mg/dL 0.37   Bilirubin, Direct Latest Ref Range: <0.31 mg/dL 0.13   Bilirubin, Indirect Latest Ref Range: 0.00 - 1.00 mg/dL 0.24   Ceruloplasmin Latest Ref Range: 16 - 45 mg/dL 26     Medications  Current Facility-Administered Medications   Medication Dose Route Frequency Provider Last Rate Last Dose    LORazepam (ATIVAN) tablet 0.5 mg  0.5 mg Oral Q6H PRN Rachid Mcintyre, RN, NP        sertraline (ZOLOFT) tablet 200 mg  200 mg Oral Daily Rachid Mcintyre, RN, NP   200 mg at 06/02/19 8795    busPIRone (BUSPAR) tablet 15 mg  15 mg Oral TID Rachid Mcintyre, XAEL, NP   15 mg at 06/02/19 7318    QUEtiapine (SEROQUEL) tablet 300 mg  300 mg Oral Nightly Richardine Devoid, APRN - CNP   300 mg at 06/01/19 2023    traZODone (DESYREL) tablet 100 mg  100 mg Oral Nightly PRN Richardine Devoid, APRN - CNP   100 mg at 06/01/19 2023    pantoprazole (PROTONIX) tablet 40 mg  40 mg Oral QAM AC Nba Braden MD   40 mg at 06/02/19 0834    sucralfate (CARAFATE) tablet 1 g  1 g Oral 4 times per day Nba Braden MD   1 g at 06/02/19 0834    acetaminophen (TYLENOL) tablet 650 mg  650 mg Oral Q4H PRN Richardine Devoid, APRN - CNP        melatonin ER tablet 1 mg  1 mg Oral Nightly PRN Richardine Devoid, APRN - CNP   1 mg at 06/01/19 2024    prazosin (MINIPRESS) capsule 2 mg  2 mg Oral Nightly Kristina Opal, APRN - CNP   2 mg at 06/01/19 2024    hydrOXYzine (ATARAX) tablet 50 mg  50 mg Oral TID PRN Faythe Simmering, APRN - CNP   50 mg at 06/01/19 2023    ondansetron (ZOFRAN-ODT) disintegrating tablet 4 mg  4 mg Oral Q8H PRN Faythe Simmering, APRN - CNP        buPROPion [facility]) tablet 100 mg  100 mg Oral Daily KristinaTERRI Edmond CNP   100 mg at 06/02/19 9431    rOPINIRole (REQUIP) tablet 2 mg  2 mg Oral Nightly Kristina TERRI Butts CNP   2 mg at 06/01/19 2023    benztropine mesylate (COGENTIN) injection 2 mg  2 mg Intramuscular BID PRN Gustavo Randy, APRN - CNP        magnesium hydroxide (MILK OF MAGNESIA) 400 MG/5ML suspension 30 mL  30 mL Oral Daily PRN Gustavo Randy, APRN - CNP        aluminum & magnesium hydroxide-simethicone (MAALOX) 200-200-20 MG/5ML suspension 30 mL  30 mL Oral Q6H PRN Gustavo Randy, APRN - CNP   30 mL at 05/27/19 2114         sertraline  200 mg Oral Daily    busPIRone  15 mg Oral TID    QUEtiapine  300 mg Oral Nightly    pantoprazole  40 mg Oral QAM AC    sucralfate  1 g Oral 4 times per day    prazosin  2 mg Oral Nightly    buPROPion  100 mg Oral Daily    rOPINIRole  2 mg Oral Nightly       ASSESSMENT  Severe major depression with psychotic features ([facility] [address]     Patient's Response to Treatment: minimal improvement    PLAN    · Supportive therapy with medication management. Reviewed risks and benefits as well as potential side effects with patient. · Continue meds   · Allow 2 days of Atiavn 0.5 mg PRN q 6 hours   · Therapeutic activities and groups  · [facility] up at [facility] after symptoms stabilized  · Internal medicine consult for acid reflux with nausea and vomiting. · Discharge planning with social work.        Electronically signed by Evette Parry RN, NP on 6/2/2019 at 10:21 AM.

## 2019-06-03 ENCOUNTER — ANESTHESIA (OUTPATIENT)
Dept: OPERATING ROOM | Age: 59
End: 2019-06-03

## 2019-06-03 ENCOUNTER — ANESTHESIA EVENT (OUTPATIENT)
Dept: OPERATING ROOM | Age: 59
End: 2019-06-03

## 2019-06-03 ENCOUNTER — APPOINTMENT (OUTPATIENT)
Dept: ULTRASOUND IMAGING | Age: 59
DRG: 751 | End: 2019-06-03
Payer: MEDICAID

## 2019-06-03 VITALS
SYSTOLIC BLOOD PRESSURE: 109 MMHG | RESPIRATION RATE: 15 BRPM | DIASTOLIC BLOOD PRESSURE: 49 MMHG | OXYGEN SATURATION: 95 %

## 2019-06-03 LAB
ALBUMIN SERPL-MCNC: 3.5 G/DL (ref 3.5–5.2)
ALBUMIN/GLOBULIN RATIO: ABNORMAL (ref 1–2.5)
ALP BLD-CCNC: 89 U/L (ref 35–104)
ALT SERPL-CCNC: 96 U/L (ref 5–33)
ANTI-NUCLEAR ANTIBODY (ANA): NEGATIVE
AST SERPL-CCNC: 60 U/L
BILIRUB SERPL-MCNC: 0.28 MG/DL (ref 0.3–1.2)
BILIRUBIN DIRECT: 0.12 MG/DL
BILIRUBIN, INDIRECT: 0.16 MG/DL (ref 0–1)
CMV IGM: 0.2
GLOBULIN: ABNORMAL G/DL (ref 1.5–3.8)
INR BLD: 1
MITOCHONDRIAL ANTIBODY: 2.3 UNITS (ref 0–20)
PROTHROMBIN TIME: 12.9 SEC (ref 11.8–14.6)
SMOOTH MUSCLE ANTIBODY: 18 UNITS (ref 0–19)
TOTAL PROTEIN: 6.1 G/DL (ref 6.4–8.3)

## 2019-06-03 PROCEDURE — 88305 TISSUE EXAM BY PATHOLOGIST: CPT

## 2019-06-03 PROCEDURE — 1240000000 HC EMOTIONAL WELLNESS R&B

## 2019-06-03 PROCEDURE — 80076 HEPATIC FUNCTION PANEL: CPT

## 2019-06-03 PROCEDURE — 6370000000 HC RX 637 (ALT 250 FOR IP): Performed by: NURSE PRACTITIONER

## 2019-06-03 PROCEDURE — 0DB68ZX EXCISION OF STOMACH, VIA NATURAL OR ARTIFICIAL OPENING ENDOSCOPIC, DIAGNOSTIC: ICD-10-PCS | Performed by: INTERNAL MEDICINE

## 2019-06-03 PROCEDURE — 88342 IMHCHEM/IMCYTCHM 1ST ANTB: CPT

## 2019-06-03 PROCEDURE — 6370000000 HC RX 637 (ALT 250 FOR IP): Performed by: INTERNAL MEDICINE

## 2019-06-03 PROCEDURE — 3609012400 HC EGD TRANSORAL BIOPSY SINGLE/MULTIPLE: Performed by: INTERNAL MEDICINE

## 2019-06-03 PROCEDURE — 76705 ECHO EXAM OF ABDOMEN: CPT

## 2019-06-03 PROCEDURE — 88341 IMHCHEM/IMCYTCHM EA ADD ANTB: CPT

## 2019-06-03 PROCEDURE — 99232 SBSQ HOSP IP/OBS MODERATE 35: CPT | Performed by: PSYCHIATRY & NEUROLOGY

## 2019-06-03 PROCEDURE — 85610 PROTHROMBIN TIME: CPT

## 2019-06-03 PROCEDURE — 0DB58ZX EXCISION OF ESOPHAGUS, VIA NATURAL OR ARTIFICIAL OPENING ENDOSCOPIC, DIAGNOSTIC: ICD-10-PCS | Performed by: INTERNAL MEDICINE

## 2019-06-03 PROCEDURE — 3700000001 HC ADD 15 MINUTES (ANESTHESIA): Performed by: INTERNAL MEDICINE

## 2019-06-03 PROCEDURE — 7100000000 HC PACU RECOVERY - FIRST 15 MIN: Performed by: INTERNAL MEDICINE

## 2019-06-03 PROCEDURE — 36415 COLL VENOUS BLD VENIPUNCTURE: CPT

## 2019-06-03 PROCEDURE — 2709999900 HC NON-CHARGEABLE SUPPLY: Performed by: INTERNAL MEDICINE

## 2019-06-03 PROCEDURE — 7100000001 HC PACU RECOVERY - ADDTL 15 MIN: Performed by: INTERNAL MEDICINE

## 2019-06-03 PROCEDURE — 99999 PR OFFICE/OUTPT VISIT,PROCEDURE ONLY: CPT | Performed by: INTERNAL MEDICINE

## 2019-06-03 PROCEDURE — 3700000000 HC ANESTHESIA ATTENDED CARE: Performed by: INTERNAL MEDICINE

## 2019-06-03 PROCEDURE — 6360000002 HC RX W HCPCS: Performed by: NURSE ANESTHETIST, CERTIFIED REGISTERED

## 2019-06-03 PROCEDURE — 2580000003 HC RX 258: Performed by: ANESTHESIOLOGY

## 2019-06-03 RX ORDER — 0.9 % SODIUM CHLORIDE 0.9 %
250 INTRAVENOUS SOLUTION INTRAVENOUS
Status: ACTIVE | OUTPATIENT
Start: 2019-06-03 | End: 2019-06-03

## 2019-06-03 RX ORDER — MIDAZOLAM HYDROCHLORIDE 1 MG/ML
INJECTION INTRAMUSCULAR; INTRAVENOUS PRN
Status: DISCONTINUED | OUTPATIENT
Start: 2019-06-03 | End: 2019-06-03 | Stop reason: SDUPTHER

## 2019-06-03 RX ORDER — PROPOFOL 10 MG/ML
INJECTION, EMULSION INTRAVENOUS PRN
Status: DISCONTINUED | OUTPATIENT
Start: 2019-06-03 | End: 2019-06-03 | Stop reason: SDUPTHER

## 2019-06-03 RX ORDER — SODIUM CHLORIDE, SODIUM LACTATE, POTASSIUM CHLORIDE, CALCIUM CHLORIDE 600; 310; 30; 20 MG/100ML; MG/100ML; MG/100ML; MG/100ML
INJECTION, SOLUTION INTRAVENOUS CONTINUOUS
Status: DISCONTINUED | OUTPATIENT
Start: 2019-06-03 | End: 2019-06-06 | Stop reason: HOSPADM

## 2019-06-03 RX ADMIN — BUSPIRONE HYDROCHLORIDE 15 MG: 15 TABLET ORAL at 14:24

## 2019-06-03 RX ADMIN — LORAZEPAM 0.5 MG: 0.5 TABLET ORAL at 15:40

## 2019-06-03 RX ADMIN — MIDAZOLAM 2 MG: 1 INJECTION INTRAMUSCULAR; INTRAVENOUS at 11:35

## 2019-06-03 RX ADMIN — PROPOFOL 50 MG: 10 INJECTION, EMULSION INTRAVENOUS at 11:35

## 2019-06-03 RX ADMIN — HYDROXYZINE HYDROCHLORIDE 50 MG: 50 TABLET, FILM COATED ORAL at 21:48

## 2019-06-03 RX ADMIN — SODIUM CHLORIDE, POTASSIUM CHLORIDE, SODIUM LACTATE AND CALCIUM CHLORIDE: 600; 310; 30; 20 INJECTION, SOLUTION INTRAVENOUS at 10:37

## 2019-06-03 RX ADMIN — ROPINIROLE HYDROCHLORIDE 2 MG: 2 TABLET, FILM COATED ORAL at 20:33

## 2019-06-03 RX ADMIN — PRAZOSIN HYDROCHLORIDE 2 MG: 1 CAPSULE ORAL at 20:33

## 2019-06-03 RX ADMIN — Medication 1 MG: at 20:33

## 2019-06-03 RX ADMIN — LORAZEPAM 0.5 MG: 0.5 TABLET ORAL at 21:48

## 2019-06-03 RX ADMIN — HYDROXYZINE HYDROCHLORIDE 50 MG: 50 TABLET, FILM COATED ORAL at 14:24

## 2019-06-03 RX ADMIN — BUSPIRONE HYDROCHLORIDE 15 MG: 15 TABLET ORAL at 20:33

## 2019-06-03 RX ADMIN — QUETIAPINE FUMARATE 300 MG: 300 TABLET ORAL at 20:33

## 2019-06-03 RX ADMIN — BUPROPION HYDROCHLORIDE 100 MG: 100 TABLET, FILM COATED ORAL at 10:38

## 2019-06-03 RX ADMIN — TRAZODONE HYDROCHLORIDE 100 MG: 100 TABLET ORAL at 20:34

## 2019-06-03 RX ADMIN — SERTRALINE 200 MG: 100 TABLET, FILM COATED ORAL at 10:39

## 2019-06-03 ASSESSMENT — PULMONARY FUNCTION TESTS
PIF_VALUE: 1
PIF_VALUE: 0
PIF_VALUE: 1

## 2019-06-03 ASSESSMENT — PAIN - FUNCTIONAL ASSESSMENT: PAIN_FUNCTIONAL_ASSESSMENT: 0-10

## 2019-06-03 ASSESSMENT — PAIN DESCRIPTION - DESCRIPTORS: DESCRIPTORS: ACHING;DISCOMFORT

## 2019-06-03 NOTE — ANESTHESIA POSTPROCEDURE EVALUATION
Department of Anesthesiology  Postprocedure Note    Patient: Milly Burgess  MRN: [id number]  [place]: 1960  Date of evaluation: 6/3/2019  Time:  2:16 PM     Procedure Summary     Date:  06/03/19 Room / Location:  [address] [place] [address] Milvia Martin 08 / [id number]    Anesthesia Start:  2303 Anesthesia Stop:  5525    Procedure:  EGD BIOPSY (N/A Esophagus) Diagnosis:  (DIFFICULT SWALLOWING)    Surgeon:  Claudia Cifuentes MD Responsible Provider:  Shaw Cervantes MD    Anesthesia Type:  MAC ASA Status:  2          Anesthesia Type: MAC    Kaleigh Phase I: Kaleigh Score: 9    Kaleigh Phase II:      Last vitals: Reviewed and per EMR flowsheets.        Anesthesia Post Evaluation    Comments: POST- ANESTHESIA EVALUATION       Pt Name: Milly Burgess  MRN: [id number]  [place]: 1960  Date of evaluation: 6/3/2019  Time:  2:17 PM      /71   Pulse 69   Temp 98.1 °F (36.7 °C) (Infrared)   Resp 11   Ht 5' 2\" (1.575 m)   Wt 206 lb (93.4 kg)   LMP  (Exact Date)   SpO2 96%   BMI 37.68 kg/m²      Consciousness Level  Awake  Cardiopulmonary Status  Stable  Pain Adequately Treated YES  Nausea / Vomiting  NO  Adequate Hydration  YES  Anesthesia Related Complications NONE      Electronically signed by Shaw Cervantes MD on 6/3/2019 at 2:17 PM

## 2019-06-03 NOTE — PLAN OF CARE
Problem: Altered Mood, Depressive Behavior:  Goal: Able to verbalize acceptance of life and situations over which he or she has no control  Description  Able to verbalize acceptance of life and situations over which he or she has no control  6/2/2019 2246 by Opal Valiente RN  Outcome: Ongoing  Note:   Patient denies suicidal ideations, denies hallucinations, very anxious about her upcoming EGD as she is a cancer survivor and was told she has \"abnormal tissue\", reports increasing depression as she says it is linked when her anxiety increases, cooperative with 1:1 time, out and social in milieu with both staff and peers, attends to [employer] independently, medication compliant and behaviorally controlled at this time

## 2019-06-03 NOTE — GROUP NOTE
Group Therapy Note    Date: Anabel 3    Group Start Time: 1430  Group End Time: 0360  Group Topic: Recovery    [facility]    NELY Fernandes LSW    patient refused to attend Recovery group at 2:30 pm after encouragement from staff.   1:1 talk time provided as alternative to group session        Signature:  NELY Fernandes LSW

## 2019-06-03 NOTE — PROGRESS NOTES
Department of Psychiatry  Attending Progress Note    Chief Complaint: Severe major depression with psychotic features ([address]     SUBJECTIVE:  Seen and interviewed in detail in her room. She just got back from EGD to help with difficulty swallowing issues. She is still very depressed and down. Patient is requesting something for short-term anxiety. Chart and medications reviewed. At this time there is no safe alternative other than inpatient care. OBJECTIVE    Physical  /71   Pulse 69   Temp 98.1 °F (36.7 °C) (Infrared)   Resp 11   Ht 5' 2\" (1.575 m)   Wt 206 lb (93.4 kg)   LMP  (Exact Date)   SpO2 96%   BMI 37.68 kg/m²      Mental Status Evaluation:  Orientation: alertness: alert   Mood:. anxious and depressed but better      Affect:  Blunted, but a little more reactive than previously      Appearance:  casually dressed   Activity:  psychomotor agitation observed legs bouncing and ringing of hands   Gait/Posture: Normal   Speech:  WNL but would be elevated at times   Thought Process:  circumstantial   Thought Content: Within normal limits   Sensorium:  person, place and time/date   Cognition:  grossly intact   Memory: intact   Insight:  fair   Judgment: fair   Suicidal Ideations: Denies suicidal thoughts   Homicidal Ideations: Negative for homicidal ideation      Medication Side Effects: absent       Attention Span attention span appeared shorter than expected for age       Labs:Results for Lona Bunn (MRN [id number])    Ref.  Range 6/1/2019 10:49   Albumin/Globulin Ratio Latest Ref Range: 1.0 - 2.5  NOT REPORTED   Total Protein Latest Ref Range: 6.4 - 8.3 g/dL 6.8   A-1 Antitrypsin Latest Ref Range: 90 - 200 mg/dL 126   Albumin Latest Ref Range: 3.5 - 5.2 g/dL 3.9   Globulin Latest Ref Range: 1.5 - 3.8 g/dL NOT REPORTED   Alk Phos Latest Ref Range: 35 - 104 U/L 92   ALT Latest Ref Range: 5 - 33 U/L 113 (H)   AST Latest Ref Range: <32 U/L 77 (H)   Bilirubin Latest Ref Range: 0.3 - 1.2 mg/dL 0.37 Bilirubin, Direct Latest Ref Range: <0.31 mg/dL 0.13   Bilirubin, Indirect Latest Ref Range: 0.00 - 1.00 mg/dL 0.24   Ceruloplasmin Latest Ref Range: 16 - 45 mg/dL 26     Medications  Current Facility-Administered Medications   Medication Dose Route Frequency Provider Last Rate Last Dose    0.9 % sodium chloride bolus  250 mL Intravenous Once PRN Oksana Bautista MD        lactated ringers infusion   Intravenous Continuous Oksana Bautista MD   Stopped at 06/03/19 1310    LORazepam (ATIVAN) tablet 0.5 mg  0.5 mg Oral Q6H PRN [facility], RN, NP   0.5 mg at 06/02/19 1943    sertraline (ZOLOFT) tablet 200 mg  200 mg Oral Daily [facility], RN, NP   Stopped at 06/03/19 1039    busPIRone (BUSPAR) tablet 15 mg  15 mg Oral TID [facility], RN, NP   Stopped at 06/03/19 1039    QUEtiapine (SEROQUEL) tablet 300 mg  300 mg Oral Nightly [place] Stephan, APRN - CNP   300 mg at 06/02/19 2149    traZODone (DESYREL) tablet 100 mg  100 mg Oral Nightly PRN Jessica Stephan, APRN - CNP   100 mg at 06/02/19 2149    pantoprazole (PROTONIX) tablet 40 mg  40 mg Oral QAM AC Lars Moore MD   Stopped at 06/03/19 1000    sucralfate (CARAFATE) tablet 1 g  1 g Oral 4 times per day Lars Moore MD   Stopped at 06/03/19 0742    acetaminophen (TYLENOL) tablet 650 mg  650 mg Oral Q4H PRN Jessica Stephan, APRN - CNP        melatonin ER tablet 1 mg  1 mg Oral Nightly PRN Jessica Martinezl, APRN - CNP   1 mg at 06/02/19 2149    prazosin (MINIPRESS) capsule 2 mg  2 mg Oral Nightly Minetta Hernandez, APRN - CNP   2 mg at 06/02/19 2149    hydrOXYzine (ATARAX) tablet 50 mg  50 mg Oral TID PRN Nisha Rollee, APRN - CNP   50 mg at 06/02/19 2149    ondansetron (ZOFRAN-ODT) disintegrating tablet 4 mg  4 mg Oral Q8H PRN Nisha Aquilinoe, APRN - CNP        buPROPion [facility]) tablet 100 mg  100 mg Oral Daily TERRI Dominguez CNP   Stopped at 06/03/19 1038    rOPINIRole (REQUIP) tablet 2 mg  2 mg Oral Nightly TERRI Dominguez - CNP   2 mg at 06/02/19 2149    benztropine mesylate (COGENTIN) injection 2 mg  2 mg Intramuscular BID PRN Elisabether , APRN - CNP        magnesium hydroxide (MILK OF MAGNESIA) 400 MG/5ML suspension 30 mL  30 mL Oral Daily PRN Elisabether , APRN - CNP        aluminum & magnesium hydroxide-simethicone (MAALOX) 200-200-20 MG/5ML suspension 30 mL  30 mL Oral Q6H PRN Antonia Eldridge, TERRI - CNP   30 mL at 05/27/19 2114         sertraline  200 mg Oral Daily    busPIRone  15 mg Oral TID    QUEtiapine  300 mg Oral Nightly    pantoprazole  40 mg Oral QAM AC    sucralfate  1 g Oral 4 times per day    prazosin  2 mg Oral Nightly    buPROPion  100 mg Oral Daily    rOPINIRole  2 mg Oral Nightly       ASSESSMENT  Severe major depression with psychotic features ([facility] [address]     Patient's Response to Treatment: minimal improvement    PLAN    · Supportive therapy with medication management. Reviewed risks and benefits as well as potential side effects with patient. · Continue meds   · Discharge planning with social work.        Electronically signed by Evette Bolton MD on 6/3/2019 at 2:22 PM.

## 2019-06-03 NOTE — GROUP NOTE
Group Therapy Note    Date: Anabel 3    Group Start Time: 1600  Group End Time: 1640  Group Topic: Group Documentation    [facility]    Renate Oates RN        Group Therapy Note    Attendees: 9 of 23         Patient's Goal:  Talk and Toss Ball. Goal to discuss feelings     Notes:      Status After Intervention:  Improved    Participation Level:  Active Listener and Interactive    Participation Quality: Appropriate, Attentive and Sharing      Speech:  normal      Thought Process/Content: Flight of ideas      Affective Functioning: Congruent      Mood: anxious      Level of consciousness:  Oriented x4      Response to Learning: Able to verbalize current knowledge/experience      Endings: None Reported    Modes of Intervention: Education and Activity      Discipline Responsible: Registered Nurse      Signature:  Yamel Gibson RN

## 2019-06-03 NOTE — ANESTHESIA PRE PROCEDURE
Department of Anesthesiology  Preprocedure Note       Name:  Jacqueiln Shaw   Age:  59 y.o.  :  1960                                          MRN:  [id number]         Date:  6/3/2019      Surgeon: Aaron Dick):  Cortney Cárdenas MD    Procedure: EGD (N/A Esophagus)    Medications prior to admission:   Prior to Admission medications    Medication Sig Start Date End Date Taking?  Authorizing Provider   hydrOXYzine (ATARAX) 25 MG tablet Take 25 mg by mouth 3 times daily as needed for Anxiety   Yes Historical Provider, MD   rOPINIRole (REQUIP) 1 MG tablet Take 1 mg by mouth nightly   Yes Historical Provider, MD   busPIRone (BUSPAR) 5 MG tablet Take 1 tablet by mouth 3 times daily 4/15/19  Yes TERRI Zimmerman   buPROPion (WELLBUTRIN) 100 MG tablet Take 1 tablet by mouth daily 19  Yes TERRI Zimmerman   sertraline (ZOLOFT) 50 MG tablet Take 3 tablets by mouth daily 19  Yes TERRI Zimmerman   QUEtiapine (SEROQUEL) 100 MG tablet Take 1 tablet by mouth nightly 4/15/19  Yes TERRI Zimmerman       Current medications:    Current Facility-Administered Medications   Medication Dose Route Frequency Provider Last Rate Last Dose    LORazepam (ATIVAN) tablet 0.5 mg  0.5 mg Oral Q6H PRN Zay Nelson, RN, NP   0.5 mg at 19 1943    sertraline (ZOLOFT) tablet 200 mg  200 mg Oral Daily Zay Nelson, RN, NP   200 mg at 19 1860    busPIRone (BUSPAR) tablet 15 mg  15 mg Oral TID Zay Nelson, RN, NP   15 mg at 19    QUEtiapine (SEROQUEL) tablet 300 mg  300 mg Oral Nightly TERRI Jackman CNP   300 mg at 19    traZODone (DESYREL) tablet 100 mg  100 mg Oral Nightly PRN TERRI Jackman CNP   100 mg at 19    pantoprazole (PROTONIX) tablet 40 mg  40 mg Oral QAM AC Jared Bee MD   Stopped at 19 1000    sucralfate (CARAFATE) tablet 1 g  1 g Oral 4 times per day Jared Bee MD   Stopped at 19 0742    acetaminophen (TYLENOL) 06/03/2019    APTT 25.1 04/02/2019       HCG (If Applicable): No results found for: PREGTESTUR, PREGSERUM, HCG, HCGQUANT     ABGs: No results found for: PHART, PO2ART, VIK7FDJ, FWG5URU, BEART, Y5EFEATR     Type & Screen (If Applicable):  No results found for: LABABO, [address]    Anesthesia Evaluation  Patient summary reviewed and Nursing notes reviewed no history of anesthetic complications:   Airway: Mallampati: II  TM distance: >3 FB     Mouth opening: > = 3 FB Dental:    (+) implants  Comment: Missing lower tooth    Pulmonary:Negative Pulmonary ROS and normal exam                               Cardiovascular:    (+) hypertension:,                   Neuro/Psych:   (+) psychiatric history: stable with treatment            GI/Hepatic/Renal:   (+) GERD:, morbid obesity          Endo/Other: Negative Endo/Other ROS                    Abdominal:           Vascular:                                        Anesthesia Plan      MAC     ASA 2       Induction: intravenous. Anesthetic plan and risks discussed with patient. Plan discussed with CRNA.                   Tyler Corley MD   6/3/2019

## 2019-06-03 NOTE — GROUP NOTE
Group Therapy Note    Date: Anabel 3    Group Start Time: 1330  Group End Time: 0581  Group Topic: Cognitive Skills    RIMMA Mcintyre, CTRS    Patient did not attend Cognitive Skills Group after encouragement from staff. 1:1 talk time offered but patient refused.       Signature:  Daphine Mcburney

## 2019-06-03 NOTE — OP NOTE
PROCEDURE NOTE    DATE OF PROCEDURE: 6/3/2019     SURGEON: Mechelle Snyder MD  Facility: [facility]  ASSISTANT: None  Anesthesia: MAC  PREOPERATIVE DIAGNOSIS:   Dysphagia     Diagnosis:  Distal esophageal small 1 cm ulcer, bxs , Schatzki ring   Small H H    gastritis , bxs taken    small sessile gastric polyp, antrum, bxs       POSTOPERATIVE DIAGNOSIS: As described below    OPERATION: Upper GI endoscopy with Biopsy    ANESTHESIA: Moderate Sedation     ESTIMATED BLOOD LOSS: Less than 50 ml    COMPLICATIONS: None. SPECIMENS:  Was Obtained:   Distal esophageal small 1 cm ulcer, bxs , Schatzki ring   Small H H    gastritis , bxs taken    small sessile gastric polyp, antrum, bxs         HISTORY: The patient is a 59y.o. year old female with history of above preop diagnosis. I recommended esophagogastroduodenoscopy with possible biopsy and I explained the risk, benefits, expected outcome, and alternatives to the procedure. Risks included but are not limited to bleeding, infection, respiratory distress, hypotension, and perforation of the esophagus, stomach, or duodenum. Patient understands and is in agreement. PROCEDURE: The patient was given IV conscious sedation. The patient's SPO2 remained above 90% throughout the procedure. The gastroscope was inserted orally and advanced under direct vision through the esophagus, through the stomach, through the pylorus, and into the descending duodenum. Post sedation note : The patient's SPO2 remained above 90% throughout the procedure. the vital signs remained stable , and no immediate complication form the procedure noted, patient will be ready for d/c when criteria is met .       Findings:    Retropharyngeal area was grossly normal appearing    Esophagus: abnormal: Distal esophageal small 1 cm ulcer, bxs , Schatzki ring   Small H H           Stomach:    Fundus: abnormal:  Above     Body: abnormal:  gastritis , bxs taken     Antrum: abnormal:  gastritis , bxs taken    small sessile gastric polyp, antrum, bxs     Duodenum:     Descending: normal    Bulb: normal    The scope was removed and the patient tolerated the procedure well. Recommendations/Plan:   1. F/U Biopsies  2. PPI  3. Carafate   4. F/U In Office in 3-4 weeks  5.  Discussed with the family    Electronically signed by Steve Soto MD  on 6/3/2019 at 11:51 AM

## 2019-06-03 NOTE — INTERVAL H&P NOTE
HISTORY and [address]       NAME:  Adilia Cox  MRN: [id number]   YOB: 1960   Date: 6/3/2019   Age: 59 y.o. Gender: female     H&P Update Note    H&P from 05/23/2019  reviewed and updated, Patient examined. Vitals: /73   Pulse 73   Temp 98.1 °F (36.7 °C) (Oral)   Resp 16   Ht 5' 2\" (1.575 m)   Wt 206 lb (93.4 kg)   LMP  (Exact Date)   SpO2 100%   BMI 37.68 kg/m²  Body mass index is 37.68 kg/m². Adilia Cox is 59 y.o.,   female, having an EGD. Pt has nausea / Luz Marina Mons with the food getting stuck in the upper and lower esophagus. Symptoms got worse 6 months ago. S/P cancer colon with colon resection ileostomy in the RLQ  Patient denies any other GI symptoms. No No diarrhea or constipation. No abdominal pains or cramping, No heartburn, no changes in the color of the stools. No fever or chills. I concur with the findings.        DORIE DE LA GARZA PA-C on 6/3/2019 at 10:16 AM

## 2019-06-03 NOTE — GROUP NOTE
Group Therapy Note    Date: Anabel 3    Group Start Time: 1000  Group End Time: 3907  Group Topic: Cognitive Skills    [facility] WILTON SYLVESTER    [place], [place]    Patient unable to attend Recreational Therapy Group at 1000 due to being on the  medical floor for a procedure.     Signature:  Lois Rust

## 2019-06-03 NOTE — GROUP NOTE
Group Therapy Note    Date: Anabel 3    Group Start Time: 0905  Group End Time: 0930  Group Topic: Community Meeting    [facility] Hiren Arguello    Patient refused to attend [employer] at 3161 after encouragement from staff. 1:1 talk time offered but patient refused.        Signature:  Viral Cabrales

## 2019-06-04 LAB
EBV EARLY ANTIGEN IGG: 65 U/ML
EBV INTERPRETATION: ABNORMAL
EBV NUCLEAR AG AB: 647 U/ML
EPSTEIN-BARR VCA IGG: 955 U/ML
EPSTEIN-BARR VCA IGM: 22 U/ML
SURGICAL PATHOLOGY REPORT: NORMAL

## 2019-06-04 PROCEDURE — 99232 SBSQ HOSP IP/OBS MODERATE 35: CPT | Performed by: INTERNAL MEDICINE

## 2019-06-04 PROCEDURE — 99232 SBSQ HOSP IP/OBS MODERATE 35: CPT | Performed by: NURSE PRACTITIONER

## 2019-06-04 PROCEDURE — 6370000000 HC RX 637 (ALT 250 FOR IP): Performed by: NURSE PRACTITIONER

## 2019-06-04 PROCEDURE — APPNB30 APP NON BILLABLE TIME 0-30 MINS: Performed by: NURSE PRACTITIONER

## 2019-06-04 PROCEDURE — 90833 PSYTX W PT W E/M 30 MIN: CPT | Performed by: NURSE PRACTITIONER

## 2019-06-04 PROCEDURE — 6370000000 HC RX 637 (ALT 250 FOR IP): Performed by: INTERNAL MEDICINE

## 2019-06-04 PROCEDURE — 1240000000 HC EMOTIONAL WELLNESS R&B

## 2019-06-04 RX ORDER — BUPROPION HYDROCHLORIDE 100 MG/1
150 TABLET ORAL DAILY
Status: DISCONTINUED | OUTPATIENT
Start: 2019-06-05 | End: 2019-06-06 | Stop reason: HOSPADM

## 2019-06-04 RX ADMIN — BUSPIRONE HYDROCHLORIDE 15 MG: 15 TABLET ORAL at 09:47

## 2019-06-04 RX ADMIN — TRAZODONE HYDROCHLORIDE 100 MG: 100 TABLET ORAL at 21:51

## 2019-06-04 RX ADMIN — HYDROXYZINE HYDROCHLORIDE 50 MG: 50 TABLET, FILM COATED ORAL at 19:50

## 2019-06-04 RX ADMIN — PANTOPRAZOLE SODIUM 40 MG: 40 TABLET, DELAYED RELEASE ORAL at 06:30

## 2019-06-04 RX ADMIN — BUSPIRONE HYDROCHLORIDE 15 MG: 15 TABLET ORAL at 21:50

## 2019-06-04 RX ADMIN — SERTRALINE 200 MG: 100 TABLET, FILM COATED ORAL at 09:48

## 2019-06-04 RX ADMIN — ONDANSETRON 4 MG: 4 TABLET, ORALLY DISINTEGRATING ORAL at 11:53

## 2019-06-04 RX ADMIN — SUCRALFATE 1 G: 1 TABLET ORAL at 06:30

## 2019-06-04 RX ADMIN — ROPINIROLE HYDROCHLORIDE 2 MG: 2 TABLET, FILM COATED ORAL at 21:54

## 2019-06-04 RX ADMIN — SUCRALFATE 1 G: 1 TABLET ORAL at 17:03

## 2019-06-04 RX ADMIN — HYDROXYZINE HYDROCHLORIDE 50 MG: 50 TABLET, FILM COATED ORAL at 14:52

## 2019-06-04 RX ADMIN — BUPROPION HYDROCHLORIDE 100 MG: 100 TABLET, FILM COATED ORAL at 09:48

## 2019-06-04 RX ADMIN — Medication 1 MG: at 21:51

## 2019-06-04 RX ADMIN — BUSPIRONE HYDROCHLORIDE 15 MG: 15 TABLET ORAL at 14:52

## 2019-06-04 RX ADMIN — QUETIAPINE FUMARATE 300 MG: 300 TABLET ORAL at 21:51

## 2019-06-04 ASSESSMENT — PAIN DESCRIPTION - PAIN TYPE: TYPE: ACUTE PAIN

## 2019-06-04 ASSESSMENT — PAIN SCALES - GENERAL: PAINLEVEL_OUTOF10: 10

## 2019-06-04 ASSESSMENT — PAIN DESCRIPTION - LOCATION: LOCATION: GENERALIZED

## 2019-06-04 NOTE — GROUP NOTE
Group Therapy Note    Date: June 4    Group Start Time: 1100  Group End Time: 0045  Group Topic: Psychotherapy    [address], MSW, LSW        Group Therapy Note    Attendees: 6/10         Pt declined to attend psychotherapy at 1100 am despite encouragement. Pt offered 1:1 and refused.

## 2019-06-04 NOTE — PLAN OF CARE
Problem: Pain:  Goal: Pain level will decrease  Description  Pain level will decrease  Outcome: Ongoing  Note:   Patient denied experiencing any form of pain. Pain medication or interventions not needed. Problem: Altered Mood, Depressive Behavior:  Goal: Ability to disclose and discuss suicidal ideas will improve  Description  Ability to disclose and discuss suicidal ideas will improve  Outcome: Ongoing  Note:   Patient denies suicidal ideation at this time. No self-harm, falls, or injuries. 15 minute visual safety checks maintained. Staff will continue to monitor patient and provide support.

## 2019-06-04 NOTE — GROUP NOTE
Group Therapy Note    Date: June 4    Group Start Time: 8796  Group End Time: 0920  Group Topic: [facility]    Patient refused to attend Goal Setting and [employer] after encouragement from staff. 1:1 talk time offered but patient refused.       Signature:  Anayeli Andrews

## 2019-06-04 NOTE — PLAN OF CARE
Problem: Altered Mood, Depressive Behavior:  Goal: Ability to disclose and discuss suicidal ideas will improve  Description  Ability to disclose and discuss suicidal ideas will improve  6/4/2019 1953 by Lucinda Garcia LPN  Outcome: Ongoing  Note:   Patient denies suicidal ideation at this time. No self-harm. 15 minute visual checks maintained. Staff will continue to monitor patient and provide support.

## 2019-06-04 NOTE — BH NOTE
Group Therapy Note     Date: June 4     Group Start Time: 1600  Group End Time: 1630  Group Topic: Music Therapy     [facility]    Solo Castro LPN     Attendees: 8           Patient's Goal:  To learn the benefits of music therapy for various mental health conditions     Notes:  Patient joined group approximately 15 minutes late.      Status After Intervention:  Unchanged     Participation Level: Minimal     Participation Quality: Appropriate        Speech:  normal        Thought Process/Content: Logical        Affective Functioning: appropriate, congruent        Mood: euthymic        Level of consciousness:  Alert, Oriented x4 and Attentive        Response to Learning: Able to verbalize current knowledge/experience, Able to verbalize/acknowledge new learning, Able to retain information, Capable of insight and Progressing to goal        Endings: None Reported         Modes of Intervention: Socialization, Exploration, Clarifying, Activity,         Discipline Responsible: LPN        Signature:   Solo Castro LPN

## 2019-06-04 NOTE — CARE COORDINATION
Pt requests 1:1 with writer and reports increased anxiety d/t CNP prompting of dc planning. Pt states, \"I have all this physical stuff going on, I can't even understand it and she's trying to kick me out. I know I am not ready. If she kicks me out I am going somewhere else right from here to there\". Pt reports, \"I'm either shaking like a dog shitting razor blades or I am sleeping and crying\". She states her mood has not improved. She endorsed continued depression and increased anxiety. Writer encourage pt to discuss sx with provider, pt agreed.  Writer encouraged pt's to attend groups and interact with staff and peers

## 2019-06-04 NOTE — PROGRESS NOTES
Department of Psychiatry  Nurse Practitioner Progress Note    Chief Complaint: Severe major depression with psychotic features ([address]     SUBJECTIVE:  Today Beverly is seen in her room. She is in bed, disheveled, flat and poorly reactive. She has not been attending groups. Writer discussed and educated patient regarding groups and their importance in developing coping and life skills. Patient is not interested in attending at this time as she is isolative to room. She states that she is not ready for discharge however; she is not participating in her care. Today Beverly is denying SI, HI and hallucinations endorsing depression and anxiety only. She is verbalizing that she is not ready for discharge. Writer educated patient regarding chronic depression and anxiety and that therapy along with medication can improve symptoms. She also discussed the fact that these symptoms may never go away but that coordinated, participative care by patient is required and that symptoms can be managed in an outpatient setting. She has a safe place to live at [address] [place] [address] and they are saving her room. She is focused on the results of her EGD. Writer deferred discussion to the internal medicine service. Chart and medications reviewed. Therapeutic support, empathetic care and psycho education provided greater than 20 minutes. At this time there is no safe alternative other than inpatient care.     OBJECTIVE    Physical  /74   Pulse 71   Temp 97.3 °F (36.3 °C) (Oral)   Resp 16   Ht 5' 2\" (1.575 m)   Wt 206 lb (93.4 kg)   LMP  (Exact Date)   SpO2 96%   BMI 37.68 kg/m²      Mental Status Evaluation:  Orientation: alertness: alert   Mood:. anxious and depressed but better      Affect:  Blunted, but a little more reactive than previously      Appearance:  casually dressed   Activity:  psychomotor agitation observed legs bouncing and ringing of hands   Gait/Posture: Normal   Speech:  WNL but would be elevated at times   Thought Process:  circumstantial   Thought Content: Within normal limits   Sensorium:  person, place and time/date   Cognition:  grossly intact   Memory: intact   Insight:  fair   Judgment: fair   Suicidal Ideations: Denies suicidal thoughts   Homicidal Ideations: Negative for homicidal ideation      Medication Side Effects: absent       Attention Span attention span appeared shorter than expected for age       Labs:Results for Sina Gill (MRN [id number])    Ref.  Range 6/1/2019 10:49   Albumin/Globulin Ratio Latest Ref Range: 1.0 - 2.5  NOT REPORTED   Total Protein Latest Ref Range: 6.4 - 8.3 g/dL 6.8   A-1 Antitrypsin Latest Ref Range: 90 - 200 mg/dL 126   Albumin Latest Ref Range: 3.5 - 5.2 g/dL 3.9   Globulin Latest Ref Range: 1.5 - 3.8 g/dL NOT REPORTED   Alk Phos Latest Ref Range: 35 - 104 U/L 92   ALT Latest Ref Range: 5 - 33 U/L 113 (H)   AST Latest Ref Range: <32 U/L 77 (H)   Bilirubin Latest Ref Range: 0.3 - 1.2 mg/dL 0.37   Bilirubin, Direct Latest Ref Range: <0.31 mg/dL 0.13   Bilirubin, Indirect Latest Ref Range: 0.00 - 1.00 mg/dL 0.24   Ceruloplasmin Latest Ref Range: 16 - 45 mg/dL 26     Medications  Current Facility-Administered Medications   Medication Dose Route Frequency Provider Last Rate Last Dose    [START ON 6/5/2019] buPROPion (WELLBUTRIN) tablet 150 mg  150 mg Oral Daily Hodan Paz APRN - CNP        lactated ringers infusion   Intravenous Continuous Jefferson Ramirez MD   Stopped at 06/03/19 1310    sertraline (ZOLOFT) tablet 200 mg  200 mg Oral Daily Jenny Becerra RN, NP   200 mg at 06/04/19 0948    busPIRone (BUSPAR) tablet 15 mg  15 mg Oral TID Jenny Becerra RN, NP   15 mg at 06/04/19 0947    QUEtiapine (SEROQUEL) tablet 300 mg  300 mg Oral Nightly [place] Gazella, APRN - CNP   300 mg at 06/03/19 2033    traZODone (DESYREL) tablet 100 mg  100 mg Oral Nightly PRN [place] Gazella, APRN - CNP   100 mg at 06/03/19 2034    pantoprazole (PROTONIX) tablet 40 mg  40 mg Oral JIMENA Infante [facility], efficacy and side effects. · Therapeutic support and empathetic care provided greater than 20 minutes. · Engage in therapeutic activities and groups. · Follow up at [facility] [place] after symptoms stabilized. · Discharge planning with social work once medically cleared by internal medicine.        Electronically signed by TERRI Pina CNP on 6/4/2019 at 1:38 PM.

## 2019-06-04 NOTE — GROUP NOTE
Group Therapy Note    Date: June 4    Group Start Time: 1330  Group End Time: 5870  Group Topic: Cognitive Skills    [facility] [place]    Patient refused to attend Recreational Therapy Group at 1330 after encouragement from staff. 1:1 talk time offered but patient refused.      Signature:  Veronica Monae

## 2019-06-05 PROBLEM — F32.3 SEVERE MAJOR DEPRESSION WITH PSYCHOTIC FEATURES (HCC): Status: RESOLVED | Noted: 2019-04-02 | Resolved: 2019-06-05

## 2019-06-05 LAB
ALBUMIN SERPL-MCNC: 3.9 G/DL (ref 3.5–5.2)
ALBUMIN/GLOBULIN RATIO: ABNORMAL (ref 1–2.5)
ALP BLD-CCNC: 101 U/L (ref 35–104)
ALT SERPL-CCNC: 106 U/L (ref 5–33)
AST SERPL-CCNC: 71 U/L
BILIRUB SERPL-MCNC: 0.29 MG/DL (ref 0.3–1.2)
BILIRUBIN DIRECT: 0.09 MG/DL
BILIRUBIN, INDIRECT: 0.2 MG/DL (ref 0–1)
GLOBULIN: ABNORMAL G/DL (ref 1.5–3.8)
HCV QUANTITATIVE: NORMAL
HEPATITIS C GENOTYPE: NORMAL
TOTAL PROTEIN: 6.7 G/DL (ref 6.4–8.3)

## 2019-06-05 PROCEDURE — 90833 PSYTX W PT W E/M 30 MIN: CPT | Performed by: NURSE PRACTITIONER

## 2019-06-05 PROCEDURE — 1240000000 HC EMOTIONAL WELLNESS R&B

## 2019-06-05 PROCEDURE — 36415 COLL VENOUS BLD VENIPUNCTURE: CPT

## 2019-06-05 PROCEDURE — 6370000000 HC RX 637 (ALT 250 FOR IP): Performed by: NURSE PRACTITIONER

## 2019-06-05 PROCEDURE — 6370000000 HC RX 637 (ALT 250 FOR IP): Performed by: INTERNAL MEDICINE

## 2019-06-05 PROCEDURE — 80076 HEPATIC FUNCTION PANEL: CPT

## 2019-06-05 PROCEDURE — 99232 SBSQ HOSP IP/OBS MODERATE 35: CPT | Performed by: NURSE PRACTITIONER

## 2019-06-05 RX ORDER — HYDROXYZINE 50 MG/1
50 TABLET, FILM COATED ORAL 3 TIMES DAILY PRN
Qty: 90 TABLET | Refills: 0 | Status: SHIPPED | OUTPATIENT
Start: 2019-06-05 | End: 2019-06-15

## 2019-06-05 RX ORDER — TRAZODONE HYDROCHLORIDE 100 MG/1
100 TABLET ORAL NIGHTLY PRN
Qty: 30 TABLET | Refills: 0 | Status: SHIPPED | OUTPATIENT
Start: 2019-06-05

## 2019-06-05 RX ORDER — ROPINIROLE 2 MG/1
2 TABLET, FILM COATED ORAL NIGHTLY
Qty: 30 TABLET | Refills: 0 | Status: SHIPPED | OUTPATIENT
Start: 2019-06-05

## 2019-06-05 RX ORDER — BUPROPION HYDROCHLORIDE 75 MG/1
150 TABLET ORAL DAILY
Qty: 60 TABLET | Refills: 0 | Status: SHIPPED | OUTPATIENT
Start: 2019-06-06

## 2019-06-05 RX ORDER — BUSPIRONE HYDROCHLORIDE 15 MG/1
15 TABLET ORAL 3 TIMES DAILY
Qty: 90 TABLET | Refills: 0 | Status: SHIPPED | OUTPATIENT
Start: 2019-06-05

## 2019-06-05 RX ORDER — SUCRALFATE 1 G/1
1 TABLET ORAL 4 TIMES DAILY
Qty: 120 TABLET | Refills: 0 | Status: SHIPPED | OUTPATIENT
Start: 2019-06-05

## 2019-06-05 RX ORDER — PRAZOSIN HYDROCHLORIDE 2 MG/1
2 CAPSULE ORAL NIGHTLY
Qty: 30 CAPSULE | Refills: 0 | Status: SHIPPED | OUTPATIENT
Start: 2019-06-05

## 2019-06-05 RX ORDER — PANTOPRAZOLE SODIUM 40 MG/1
40 TABLET, DELAYED RELEASE ORAL
Qty: 30 TABLET | Refills: 0 | Status: SHIPPED | OUTPATIENT
Start: 2019-06-06

## 2019-06-05 RX ORDER — SERTRALINE HYDROCHLORIDE 100 MG/1
200 TABLET, FILM COATED ORAL DAILY
Qty: 60 TABLET | Refills: 0 | Status: SHIPPED | OUTPATIENT
Start: 2019-06-06

## 2019-06-05 RX ORDER — QUETIAPINE FUMARATE 300 MG/1
300 TABLET, FILM COATED ORAL NIGHTLY
Qty: 30 TABLET | Refills: 0 | Status: SHIPPED | OUTPATIENT
Start: 2019-06-05

## 2019-06-05 RX ADMIN — QUETIAPINE FUMARATE 300 MG: 300 TABLET ORAL at 21:10

## 2019-06-05 RX ADMIN — BUSPIRONE HYDROCHLORIDE 15 MG: 15 TABLET ORAL at 21:10

## 2019-06-05 RX ADMIN — SERTRALINE 200 MG: 100 TABLET, FILM COATED ORAL at 09:09

## 2019-06-05 RX ADMIN — BUSPIRONE HYDROCHLORIDE 15 MG: 15 TABLET ORAL at 09:09

## 2019-06-05 RX ADMIN — BUPROPION HYDROCHLORIDE 150 MG: 100 TABLET, FILM COATED ORAL at 09:08

## 2019-06-05 RX ADMIN — ALUMINUM HYDROXIDE, MAGNESIUM HYDROXIDE, AND SIMETHICONE 30 ML: 200; 200; 20 SUSPENSION ORAL at 19:01

## 2019-06-05 RX ADMIN — SUCRALFATE 1 G: 1 TABLET ORAL at 17:38

## 2019-06-05 RX ADMIN — SUCRALFATE 1 G: 1 TABLET ORAL at 06:37

## 2019-06-05 RX ADMIN — PRAZOSIN HYDROCHLORIDE 2 MG: 1 CAPSULE ORAL at 21:09

## 2019-06-05 RX ADMIN — HYDROXYZINE HYDROCHLORIDE 50 MG: 50 TABLET, FILM COATED ORAL at 21:10

## 2019-06-05 RX ADMIN — ROPINIROLE HYDROCHLORIDE 2 MG: 2 TABLET, FILM COATED ORAL at 21:12

## 2019-06-05 RX ADMIN — Medication 1 MG: at 21:10

## 2019-06-05 RX ADMIN — HYDROXYZINE HYDROCHLORIDE 50 MG: 50 TABLET, FILM COATED ORAL at 09:09

## 2019-06-05 RX ADMIN — BUSPIRONE HYDROCHLORIDE 15 MG: 15 TABLET ORAL at 13:17

## 2019-06-05 RX ADMIN — PANTOPRAZOLE SODIUM 40 MG: 40 TABLET, DELAYED RELEASE ORAL at 06:37

## 2019-06-05 RX ADMIN — TRAZODONE HYDROCHLORIDE 100 MG: 100 TABLET ORAL at 21:10

## 2019-06-05 RX ADMIN — SUCRALFATE 1 G: 1 TABLET ORAL at 12:32

## 2019-06-05 NOTE — PLAN OF CARE
Problem: Pain:  Goal: Pain level will decrease  Description  Pain level will decrease  Outcome: Ongoing  Note:   Patient denies any form of pain at this time. No pain medication needed. Staff will continue to monitor patient and provide support.

## 2019-06-05 NOTE — GROUP NOTE
Group Therapy Note    Date: June 5    Group Start Time: 1100  Group End Time: 3910  Group Topic: Psychotherapy    Via Imelda Mcmullen, MSW, LSW        Group Therapy Note    Attendees: 6/9    Pt declined to attend psychotherapy at 1100 am despite encouragement. Pt offered 1:1 and refused.

## 2019-06-05 NOTE — BH NOTE
Patient states she has no memory of anyone discussing the results of her EGD/biopsy done on 6/3. DEIDRA Garcia asked writer to contact GI to speak with patient regarding results.  Perfect serve sent to Dr. Silvia Heart regarding request.

## 2019-06-05 NOTE — CARE COORDINATION
DISCHARGE PLANNING    Writer confirmed with [address] [place] [address] ([place] [address]) case manage, Lenore Stewart, pt will dc 6/6/19. Lenore Stewart will p/u pt @ 1:00 pm. Pt will return to [place] [address] and continue med somatic with provider at facility. Lenore Stewart requests medications be sent to [address] [place] [address] in [place], [place]. Writer coordinated information and discharge plan with ION Jay). Pt dicussed dc plan with pt who was accepting.  Pt states she is pleased with plan to return to [address], [address] [place] [address]  P: [phone]  F: [phone]

## 2019-06-05 NOTE — GROUP NOTE
Group Therapy Note    Date: June 5    Group Start Time: 0845  Group End Time: 0900  Group Topic: Community Meeting    RIMMA Melendez    Patient's Goal:  Pt will demonstrate increased interpersonal interaction    Notes:  Pt stated \"If they try to discharge me I'll tell them I'm having homicidal ideations. \"     Status After Intervention:  Unchanged    Participation Level:  Active Listener and Interactive    Participation Quality: Attentive and Sharing      Speech:  normal      Thought Process/Content: Logical  Linear      Affective Functioning: Blunted      Mood: angry      Level of consciousness:  Alert, Oriented x4 and Attentive      Response to Learning: Able to verbalize current knowledge/experience, Able to verbalize/acknowledge new learning, Able to retain information and Progressing to goal      Endings: None Reported    Modes of Intervention: Education, Support, Socialization, Exploration, Clarifying, Problem-solving and Reality-testing      Discipline Responsible: Psychoeducational Specialist      Signature:  Mirian Melendez

## 2019-06-05 NOTE — GROUP NOTE
Group Therapy Note    Date: June 5    Group Start Time: 1445  Group End Time: 1520  Group Topic: Recreational    [facility]    Dorothy Ritter    Patient's Goal: Pt will demonstrate increased interpersonal interaction      Notes:      Status After Intervention:  Improved    Participation Level:  Active Listener and Interactive    Participation Quality: Appropriate, Attentive and Sharing      Speech:  normal      Thought Process/Content: Logical  Linear      Affective Functioning: Congruent      Mood: euthymic      Level of consciousness:  Alert, Oriented x4 and Attentive      Response to Learning: Able to verbalize current knowledge/experience, Able to verbalize/acknowledge new learning, Able to retain information and Progressing to goal      Endings: None Reported    Modes of Intervention: Education, Support, Socialization, Exploration, Clarifying, Problem-solving, Activity, Confrontation and Reality-testing      Discipline Responsible: Psychoeducational Specialist      Signature:  Dorothy Ritter

## 2019-06-05 NOTE — GROUP NOTE
Group Therapy Note    Date: June 5    Group Start Time: 1330  Group End Time: 9800  Group Topic: Psychoeducation    RIMMA WILTON SYLVESTER    [place]    Attendees: 11         Patient's Goal:  To increase awareness of facts vs myths about mental illness in order to better be able to advocate for self. Notes:  Patient attended group and actively participated in task at hand. Patient conversed appropriately with peers and Sophie Stack. Status After Intervention:  Improved    Participation Level:  Active Listener and Interactive    Participation Quality: Appropriate, Attentive, Sharing and Supportive      Speech:  normal      Thought Process/Content: Logical      Affective Functioning: Congruent      Mood: euthymic      Level of consciousness:  Alert, Oriented x4 and Attentive      Response to Learning: Able to verbalize current knowledge/experience, Able to verbalize/acknowledge new learning, Able to retain information, Capable of insight and Progressing to goal      Endings: None Reported       Modes of Intervention: Education, Support, Socialization, Exploration, Clarifying, Problem-solving, Activity, Confrontation, Limit-setting and Reality-testing      Discipline Responsible: Psychoeducational Specialist      Signature:  Leopold Imus

## 2019-06-05 NOTE — GROUP NOTE
Group Therapy Note    Date: June 5    Group Start Time: 1000  Group End Time: 3498  Group Topic: Recreational    [facility], [address]    Patient refused to attend Recreational Therapy Group at 1000 after encouragement from staff. 1:1 talk time offered but patient refused.        Signature:  Sandrita Marlow

## 2019-06-05 NOTE — PROGRESS NOTES
Department of Psychiatry  Nurse Practitioner Progress Note    Chief Complaint: Severe major depression with psychotic features ([address]     SUBJECTIVE:  Today Beverly is seen in her room. She has been seen in the milieu but is seclusive to self. She is brightened and cooperative today stating that it is time that she gets discharged so that she can get back to a regular routine. This is a significant change since yesterday. Today Beverly is denying SI and HI endorsing hallucinations of someone calling her name however; she states that this might be because she is [place]. She continues to endorse depression and anxiety that she describes as decreased since admission. Writer again educated patient regarding chronic depression and anxiety and that therapy along with medication can improve symptoms. She also discussed the fact that these symptoms may never go away but that coordinated, participative care by patient is required and that symptoms can be managed in an outpatient setting. She has a safe place to live at [address] [place] [address] and are expecting her after discharge tomorrow. Chart and medications reviewed. Therapeutic support, empathetic care and psycho education provided greater than 20 minutes. Discharge planned for 6/6/19. OBJECTIVE    Physical  /79   Pulse 81   Temp 98 °F (36.7 °C) (Oral)   Resp 14   Ht 5' 2\" (1.575 m)   Wt 206 lb (93.4 kg)   LMP  (Exact Date)   SpO2 96%   BMI 37.68 kg/m²      Mental Status Evaluation:  Orientation: alertness: alert   Mood:. Depressed but improved since admisison      Affect:  Brightened and cooperative      Appearance:  casually dressed   Activity:  psychomotor agitation observed legs bouncing and ringing of hands   Gait/Posture: Normal   Speech:  Normal pitch and volume   Thought Process:  circumstantial   Thought Content:   Within normal limits   Sensorium:  person, place and time/date   Cognition:  grossly intact   Memory: intact   Insight:  fair   Judgment: fair Suicidal Ideations: Denies suicidal thoughts   Homicidal Ideations: Negative for homicidal ideation      Medication Side Effects: absent       Attention Span attention span appeared shorter than expected for age       Labs:Results for Erin Love (MRN [id number])    Ref.  Range 6/1/2019 10:49   Albumin/Globulin Ratio Latest Ref Range: 1.0 - 2.5  NOT REPORTED   Total Protein Latest Ref Range: 6.4 - 8.3 g/dL 6.8   A-1 Antitrypsin Latest Ref Range: 90 - 200 mg/dL 126   Albumin Latest Ref Range: 3.5 - 5.2 g/dL 3.9   Globulin Latest Ref Range: 1.5 - 3.8 g/dL NOT REPORTED   Alk Phos Latest Ref Range: 35 - 104 U/L 92   ALT Latest Ref Range: 5 - 33 U/L 113 (H)   AST Latest Ref Range: <32 U/L 77 (H)   Bilirubin Latest Ref Range: 0.3 - 1.2 mg/dL 0.37   Bilirubin, Direct Latest Ref Range: <0.31 mg/dL 0.13   Bilirubin, Indirect Latest Ref Range: 0.00 - 1.00 mg/dL 0.24   Ceruloplasmin Latest Ref Range: 16 - 45 mg/dL 26     Medications  Current Facility-Administered Medications   Medication Dose Route Frequency Provider Last Rate Last Dose    buPROPion (WELLBUTRIN) tablet 150 mg  150 mg Oral Daily Abhay TERRI Guerrier - CNP   150 mg at 06/05/19 3677    lactated ringers infusion   Intravenous Continuous Jacqualine MD Ana   Stopped at 06/03/19 1310    sertraline (ZOLOFT) tablet 200 mg  200 mg Oral Daily Olga Villeda, AXEL, NP   200 mg at 06/05/19 1041    busPIRone (BUSPAR) tablet 15 mg  15 mg Oral TID Olga Villeda, AXEL, NP   15 mg at 06/05/19 1800    QUEtiapine (SEROQUEL) tablet 300 mg  300 mg Oral Nightly Lindo Nyum, APRN - CNP   300 mg at 06/04/19 2151    traZODone (DESYREL) tablet 100 mg  100 mg Oral Nightly PRN Lindo Moshe, APRN - CNP   100 mg at 06/04/19 2151    pantoprazole (PROTONIX) tablet 40 mg  40 mg Oral QAM AC Irvin Mcghee MD   40 mg at 06/05/19 0637    sucralfate (CARAFATE) tablet 1 g  1 g Oral 4 times per day Irvin Mcghee MD   1 g at 06/05/19 0637    acetaminophen (TYLENOL) tablet 650 mg  650 mg Oral Q4H PRN Marshal Parents, APRN - CNP        melatonin ER tablet 1 mg  1 mg Oral Nightly PRN Marshal Parents, APRN - CNP   1 mg at 06/04/19 2151    prazosin (MINIPRESS) capsule 2 mg  2 mg Oral Nightly Wilene Lines, APRN - CNP   2 mg at 06/03/19 2033    hydrOXYzine (ATARAX) tablet 50 mg  50 mg Oral TID PRN Edilberto Mixer, APRN - CNP   50 mg at 06/05/19 3790    ondansetron (ZOFRAN-ODT) disintegrating tablet 4 mg  4 mg Oral Q8H PRN Edilberto Mixer, APRN - CNP   4 mg at 06/04/19 1153    rOPINIRole (REQUIP) tablet 2 mg  2 mg Oral Nightly Wilene Lines, APRN - CNP   2 mg at 06/04/19 2154    benztropine mesylate (COGENTIN) injection 2 mg  2 mg Intramuscular BID PRN Edilberto Mixer, APRN - CNP        magnesium hydroxide (MILK OF MAGNESIA) 400 MG/5ML suspension 30 mL  30 mL Oral Daily PRN Edilberto Mixer, APRN - CNP        aluminum & magnesium hydroxide-simethicone (MAALOX) 200-200-20 MG/5ML suspension 30 mL  30 mL Oral Q6H PRN Edilberto Mixer, APRN - CNP   30 mL at 05/27/19 2114         buPROPion  150 mg Oral Daily    sertraline  200 mg Oral Daily    busPIRone  15 mg Oral TID    QUEtiapine  300 mg Oral Nightly    pantoprazole  40 mg Oral QAM AC    sucralfate  1 g Oral 4 times per day    prazosin  2 mg Oral Nightly    rOPINIRole  2 mg Oral Nightly       ASSESSMENT  Severe major depression with psychotic features ([facility] [address]     Patient's Response to Treatment: Positive. Patient is no longer suicidal.    PLAN  · Continue inpatient psychiatric treatment  · Supportive therapy with medication management. Reviewed risks and benefits as well as potential side effects with patient. · Continue home medications  · New Order - Increase Wellbutrin XL to 150mg daily beginning 6/5/19. · Review medications for efficacy and side effects. · Therapeutic support and empathetic care provided greater than 20 minutes. · Engage in therapeutic activities and groups.   · Follow up at [facility] [place] after symptoms stabilized. · Discharge planning with social work for 6/6/19.       Electronically signed by TERRI Arshad CNP on 6/5/2019 at 12:23 PM.

## 2019-06-06 VITALS
SYSTOLIC BLOOD PRESSURE: 130 MMHG | WEIGHT: 206 LBS | BODY MASS INDEX: 37.91 KG/M2 | DIASTOLIC BLOOD PRESSURE: 93 MMHG | TEMPERATURE: 97.8 F | HEART RATE: 84 BPM | HEIGHT: 62 IN | OXYGEN SATURATION: 96 % | RESPIRATION RATE: 14 BRPM

## 2019-06-06 LAB
DIRECT EXAM: ABNORMAL
DIRECT EXAM: ABNORMAL
Lab: ABNORMAL
SPECIMEN DESCRIPTION: ABNORMAL

## 2019-06-06 PROCEDURE — 99238 HOSP IP/OBS DSCHRG MGMT 30/<: CPT | Performed by: NURSE PRACTITIONER

## 2019-06-06 PROCEDURE — 6370000000 HC RX 637 (ALT 250 FOR IP): Performed by: NURSE PRACTITIONER

## 2019-06-06 PROCEDURE — 5130000000 HC BRIDGE APPOINTMENT

## 2019-06-06 PROCEDURE — 6370000000 HC RX 637 (ALT 250 FOR IP): Performed by: INTERNAL MEDICINE

## 2019-06-06 RX ADMIN — BUSPIRONE HYDROCHLORIDE 15 MG: 15 TABLET ORAL at 13:20

## 2019-06-06 RX ADMIN — BUPROPION HYDROCHLORIDE 150 MG: 100 TABLET, FILM COATED ORAL at 08:38

## 2019-06-06 RX ADMIN — SUCRALFATE 1 G: 1 TABLET ORAL at 07:02

## 2019-06-06 RX ADMIN — BUSPIRONE HYDROCHLORIDE 15 MG: 15 TABLET ORAL at 08:39

## 2019-06-06 RX ADMIN — HYDROXYZINE HYDROCHLORIDE 50 MG: 50 TABLET, FILM COATED ORAL at 13:18

## 2019-06-06 RX ADMIN — PANTOPRAZOLE SODIUM 40 MG: 40 TABLET, DELAYED RELEASE ORAL at 07:02

## 2019-06-06 RX ADMIN — SERTRALINE 200 MG: 100 TABLET, FILM COATED ORAL at 08:39

## 2019-06-06 NOTE — PLAN OF CARE
Problem: Altered Mood, Depressive Behavior:  Goal: Ability to disclose and discuss suicidal ideas will improve  Description  Ability to disclose and discuss suicidal ideas will improve  Outcome: Ongoing     Problem: Pain:  Goal: Control of acute pain  Description  Control of acute pain  Outcome: Ongoing   Denies SI and remains free from harm att. Continued with depression and anxiety r/ t BX results.

## 2019-06-06 NOTE — CARE COORDINATION
much to take  Notes to patient:  Lowers depression           Where to Get Your Medications      These medications were sent to [facility]  [address], [address]    Phone:  [phone]   · buPROPion 75 MG tablet  · busPIRone 15 MG tablet  · hydrOXYzine 50 MG tablet  · melatonin ER 1 MG Tbcr tablet  · pantoprazole 40 MG tablet  · prazosin 2 MG capsule  · QUEtiapine 300 MG tablet  · rOPINIRole 2 MG tablet  · sertraline 100 MG tablet  · sucralfate 1 GM tablet  · traZODone 100 MG tablet         Follow Up Appointment: MD Elvia Simmons 72, Damian Gamez [address]  [address] [place] [address]  [phone]    Schedule an appointment as soon as possible for a visit in 2 weeks      [address] [place] [address]  P: [phone]  F: [phone]  Go on 6/6/2019  Discharge to recovery center and continue med somatic with [place].  Lenore Stewart will transport @ 1:00 pm

## 2019-06-06 NOTE — GROUP NOTE
Group Therapy Note    Date: June 6    Group Start Time: 1100  Group End Time: 5247  Group Topic: Psychotherapy    Via NELY Thomas LSW        Group Therapy Note    Attendees: 7/10         Patient's Goal:  Interpersonal relationships and communication     Notes: Pt focused on dc, stated she is excited to return to supportive living on this date and attend AA meeting on this night. \"I am going to get my 30 day coin tonight\"    Status After Intervention:  Unchanged    Participation Level: Interactive    Participation Quality: Appropriate and Attentive      Speech:  normal      Thought Process/Content: Logical      Affective Functioning: Congruent      Mood: euthymic      Level of consciousness:  Alert and Oriented x4      Response to Learning: Able to verbalize current knowledge/experience and Capable of insight      Endings: None Reported    Modes of Intervention: Support      Discipline Responsible: /Counselor      Signature:   NELY Pastrana LSW

## 2019-06-06 NOTE — FLOWSHEET NOTE
06/06/19 1430   Encounter Summary   Services provided to: Patient   Referral/Consult From:   (Pent[employer]ostalism service)   Continue Visiting   (6/6/19)   Complexity of Encounter Low   Length of Encounter 30 minutes   Spiritual/Presybeterian   Type Spiritual support   Intervention   (Patient attended spirituality group led by Fr. Delphine Martínez)

## 2019-06-06 NOTE — DISCHARGE SUMMARY
DISCHARGE SUMMARY NURSE PRACTITIONER  Patient ID:  Nae Hassan  [id number]  50 y.o.  1960    Admit date: 5/22/2019    Discharge date and time: 6/6/2019  12:59 PM     Admitting Physician: Ann Gresham MD     Discharge Physician:  TERRI Street - CNP    Admission Diagnoses: Major depressive disorder, recurrent ([facility] [address] [F33.9]  Major depression, recurrent ([facility] [address] [F33.9]    Discharge Diagnoses:   Severe major depression with psychotic features [facility])     Patient Active Problem List   Diagnosis Code    Depression with suicidal ideation F32.9, R45.851    Hydronephrosis N13.30    Major depressive disorder, recurrent ([facility] [address] F33.9    Major depression, recurrent ([facility] [address] F33.9    Seasonal allergic rhinitis due to pollen J30.1    Esophageal dysphagia R13.10    Gastroesophageal reflux disease K21.9    Other irritable bowel syndrome K58.8    Hepatitis C antibody positive in blood R76.8    History of colon cancer Z85.038        Admission Condition: poor    Discharged Condition: stable. Admission Hx: aNe Hassan is a 59 y.o. female who was voluntarily admitted from the [facility] for suicidal thought to OD on medication. She is currently living at the [facility] from crack cocaine use one of her housemates recently committed suicide. Today patient is brightened verbalizing readiness for discharge. She denies SI and HI endorsing hallucinations of someone calling her name that she relates to being hard of hearing, depression and anxiety stating that both have decreased since admission. She stated that she is ready to get back to a regular routine and is excited to return to [facility]. He has been medication compliant attending selective groups. Patient reports improved sleep and good appetite. Provider discussed importance of follow-up and medication compliance. Chart and medications reviewed. Therapeutic support provided.        Indication for Admission: threat to self    History of Present Illnes (present tense wording indicates findings from admission exam on 5/22/2019 and are not necessarily indicative of current findings): Hospital Course:   Upon admission, Tommy Jo was provided a safe secure environment, introduced to unit milieu. Patient participated in groups and individual therapies. Meds were adjusted. After few days of hospital care, patient began to feel improvement. Depression lifted, thoughts to harm self ceased. Sleep improved, appetite was good. On morning rounds 6/6/2019, patient endorsed feeling ready for discharge. Patient denies suicidal or homicidal ideations, denies hallucinations or delusions. Denies SE's from meds. It was decided that pt had achieved maximum benefit from hospital care and can be discharged     Consults: None    Significant Diagnostic Studies: Routine labs and diagnostics    Treatments: Psychotropic medications, therapy with group, milieu, and 1:1 with nurses, social workers, PAEnidC/CNP, and Attending physician.       Discharge Medications:  Current Discharge Medication List      START taking these medications    Details   traZODone (DESYREL) 100 MG tablet Take 1 tablet by mouth nightly as needed for Sleep  Qty: 30 tablet, Refills: 0      prazosin (MINIPRESS) 2 MG capsule Take 1 capsule by mouth nightly  Qty: 30 capsule, Refills: 0      melatonin ER 1 MG TBCR tablet Take 1 tablet by mouth nightly as needed (Sleep)  Qty: 30 tablet, Refills: 0      pantoprazole (PROTONIX) 40 MG tablet Take 1 tablet by mouth every morning (before breakfast)  Qty: 30 tablet, Refills: 0      sucralfate (CARAFATE) 1 GM tablet Take 1 tablet by mouth 4 times daily  Qty: 120 tablet, Refills: 0         CONTINUE these medications which have CHANGED    Details   busPIRone (BUSPAR) 15 MG tablet Take 15 mg by mouth 3 times daily  Qty: 90 tablet, Refills: 0      hydrOXYzine (ATARAX) 50 MG tablet Take 1 tablet by mouth 3 times daily as needed for Anxiety  Qty: 90 tablet, Refills: 0      buPROPion (WELLBUTRIN) 75 MG tablet Take 2 tablets by mouth daily  Qty: 60 tablet, Refills: 0      sertraline (ZOLOFT) 100 MG tablet Take 2 tablets by mouth daily  Qty: 60 tablet, Refills: 0      rOPINIRole (REQUIP) 2 MG tablet Take 1 tablet by mouth nightly  Qty: 30 tablet, Refills: 0      QUEtiapine (SEROQUEL) 300 MG tablet Take 1 tablet by mouth nightly  Qty: 30 tablet, Refills: 0           Discharge Exam:  Level of consciousness:  Within normal limits  Appearance: Street clothes, seated, with good grooming  Behavior/Motor: No abnormalities noted  Attitude toward examiner:  Cooperative, attentive, good eye contact  Speech:  spontaneous, normal rate, normal volume and well articulated  Mood:  euthymic  Affect:  mood congruent  Thought processes:  linear, goal directed and coherent  Thought content:  Homocidal ideation denies  Suicidal Ideation:  denies suicidal ideation  Delusions:  no evidence of delusions  Perceptual Disturbance:  denies any perceptual disturbance  Cognition:  In tact  Memory: age appropriate  Insight & Judgement: fair  Medication side effects:  denies     Disposition: home    Patient Instructions: Activity: activity as tolerated. Follow-up as scheduled with [facility]. Time Spent: 20 minutes    Engagement: Patient displayed a good level of engagement with the treatments offered during this admission.      Discharge planning, findings, and recommendations were discussed with patient and treatment team.    Signed:  Alex James   6/6/2019  12:59 PM

## 2019-06-06 NOTE — CARE COORDINATION
Bridge Appointment completed: Reviewed Discharge Instructions with patient. Patient verbalizes understanding and agreement with the discharge plan using the teachback method.        Discharge Arrangements:   [address]  P: [phone]  F: [phone] Susanne Martin notified: NA   Discharge destination/address:   [address]  P: [phone]  F: [phone]                    Transported by:   will p/u

## 2019-06-06 NOTE — BH NOTE
[address] [facility]  Discharge Note    Pt discharged with followings belongings:   Dentures: None  Vision - Corrective Lenses: Glasses(hanging on IV pole)  Hearing Aid: None  Jewelry: None  Body Piercings Removed: N/A  Clothing: Other (Comment)(hospital gown)  Were All Patient Medications Collected?: Not Applicable  Other Valuables: None   Valuables sent home with patient. Valuables retrieved from safe, Security envelope number:  [id number] and returned to patient. Patient left department with Departure Mode: By self, Other (Comment) via Mobility at Departure: Ambulatory, discharged to Discharged to: Other (Comment)([facility]). Patient education on aftercare instructions: yes  Information faxed to [facility] by RN Patient verbalize understanding of AVS:  yes. Status EXAM upon discharge: Stable; denies SI; expresses readiness for D/C.   Status and Exam  Normal: Yes  Facial Expression: Brightened  Affect: Appropriate  Level of Consciousness: Alert  Mood:Normal: Yes  Mood: Depressed, Anxious  Motor Activity:Normal: Yes  Motor Activity: Increased  Interview Behavior: Cooperative  Preception: Woden to Person, Deanna Stack to Time, W[place] to Place, [place] to Situation  Attention:Normal: Yes  Attention: Distractible  Thought Processes: (logical)  Thought Content:Normal: Yes  Thought Content: Preoccupations  Hallucinations: None  Delusions: No  Memory:Normal: Yes  Memory: Poor Remote  Insight and Judgment: No  Insight and Judgment: Poor Insight  Present Suicidal Ideation: No  Present Homicidal Ideation: No      Metabolic Screening:    Lab Results   Component Value Date    LABA1C 5.2 05/24/2019       Lab Results   Component Value Date    CHOL 136 04/04/2019     Lab Results   Component Value Date    TRIG 91 04/04/2019     Lab Results   Component Value Date    HDL 48 05/24/2019    HDL 53 04/04/2019     No components found for: LDLCAL  No results found for: Susan Hernandez RN

## 2019-06-06 NOTE — GROUP NOTE
Group Therapy Note    Date: June 6    Group Start Time: 1000  Group End Time: 0038  Group Topic: Recreational    [facility]    Bi English      Patient's Goal: To demonstrate increased interpersonal interaction     Notes:      Status After Intervention:  Improved    Participation Level:  Active Listener and Interactive    Participation Quality: Appropriate, Attentive and Sharing      Speech:  normal      Thought Process/Content: Logical  Linear      Affective Functioning: Congruent      Mood: euthymic      Level of consciousness:  Alert, Oriented x4 and Attentive      Response to Learning: Able to verbalize current knowledge/experience, Able to verbalize/acknowledge new learning, Able to retain information and Progressing to goal      Endings: None Reported    Modes of Intervention: Education, Support, Socialization, Exploration, Clarifying, Problem-solving and Activity      Discipline Responsible: Psychoeducational Specialist      Signature:  Bi English

## 2019-06-06 NOTE — GROUP NOTE
Group Therapy Note    Date: June 6    Group Start Time: 1330  Group End Time: 1400  Group Topic: Psychoeducation    RIMMA Palafox Presume    Patient refused to attend cognitive skills/ wellness education group at 1330 after encouragement from staff. 1:1 talk time provided as alternative to group session.       Signature:  Javy Presume

## 2019-06-11 ENCOUNTER — TELEPHONE (OUTPATIENT)
Dept: GASTROENTEROLOGY | Age: 59
End: 2019-06-11

## 2019-06-11 NOTE — TELEPHONE ENCOUNTER
Patient called stating she was recently seen in the hospital at [facility] by  for a procedure and is needing to schedule an appt to f/u. Patient states she was told asap/within 2 weeks due to what was found? Please advise pt states she is currently in safe house and does not get her personal phone back until 3:30PM therefore she can take calls anytime after that.

## 2019-06-27 ENCOUNTER — HOSPITAL ENCOUNTER (OUTPATIENT)
Age: 59
Setting detail: SPECIMEN
Discharge: HOME | End: 2019-06-27
Payer: MEDICAID

## 2019-06-28 LAB
CULTURE: NO GROWTH
Lab: NORMAL
SPECIMEN DESCRIPTION: NORMAL

## 2019-07-05 ENCOUNTER — HOSPITAL ENCOUNTER (OUTPATIENT)
Dept: GENERAL RADIOLOGY | Age: 59
Discharge: HOME | End: 2019-07-07
Payer: MEDICAID

## 2019-07-05 ENCOUNTER — HOSPITAL ENCOUNTER (OUTPATIENT)
Age: 59
Discharge: HOME | End: 2019-07-07
Payer: MEDICAID

## 2019-07-05 DIAGNOSIS — R10.9 FLANK PAIN: ICD-10-CM

## 2019-07-05 PROCEDURE — 74018 RADEX ABDOMEN 1 VIEW: CPT

## 2019-07-16 ENCOUNTER — HOSPITAL ENCOUNTER (OUTPATIENT)
Dept: GENERAL RADIOLOGY | Age: 59
Discharge: HOME | End: 2019-07-18
Payer: MEDICAID

## 2019-07-16 ENCOUNTER — HOSPITAL ENCOUNTER (OUTPATIENT)
Age: 59
Discharge: HOME | End: 2019-07-18
Payer: MEDICAID

## 2019-07-16 ENCOUNTER — HOSPITAL ENCOUNTER (OUTPATIENT)
Age: 59
Discharge: HOME | End: 2019-07-16
Payer: MEDICAID

## 2019-07-16 DIAGNOSIS — M79.89 SWELLING OF LEFT FOOT: ICD-10-CM

## 2019-07-16 LAB
ABSOLUTE EOS #: 0.25 K/UL (ref 0–0.44)
ABSOLUTE IMMATURE GRANULOCYTE: 0.04 K/UL (ref 0–0.3)
ABSOLUTE LYMPH #: 1.27 K/UL (ref 1.1–3.7)
ABSOLUTE MONO #: 0.69 K/UL (ref 0.1–1.2)
ALBUMIN SERPL-MCNC: 4.2 G/DL (ref 3.5–5.2)
ALBUMIN/GLOBULIN RATIO: 1.3 (ref 1–2.5)
ALP BLD-CCNC: 106 U/L (ref 35–104)
ALT SERPL-CCNC: 70 U/L (ref 5–33)
ANION GAP SERPL CALCULATED.3IONS-SCNC: 14 MMOL/L (ref 9–17)
AST SERPL-CCNC: 54 U/L
BASOPHILS # BLD: 1 % (ref 0–2)
BASOPHILS ABSOLUTE: 0.04 K/UL (ref 0–0.2)
BILIRUB SERPL-MCNC: 0.5 MG/DL (ref 0.3–1.2)
BUN BLDV-MCNC: 16 MG/DL (ref 8–23)
BUN/CREAT BLD: ABNORMAL (ref 9–20)
CALCIUM SERPL-MCNC: 9.6 MG/DL (ref 8.6–10.4)
CHLORIDE BLD-SCNC: 104 MMOL/L (ref 98–107)
CO2: 24 MMOL/L (ref 20–31)
CREAT SERPL-MCNC: 0.9 MG/DL (ref 0.5–0.9)
DIFFERENTIAL TYPE: ABNORMAL
EOSINOPHILS RELATIVE PERCENT: 3 % (ref 1–4)
GFR AFRICAN AMERICAN: >60 ML/MIN
GFR NON-AFRICAN AMERICAN: >60 ML/MIN
GFR SERPL CREATININE-BSD FRML MDRD: ABNORMAL ML/MIN/{1.73_M2}
GFR SERPL CREATININE-BSD FRML MDRD: ABNORMAL ML/MIN/{1.73_M2}
GLUCOSE BLD-MCNC: 87 MG/DL (ref 70–99)
HCT VFR BLD CALC: 43.4 % (ref 36.3–47.1)
HEMOGLOBIN: 13.3 G/DL (ref 11.9–15.1)
HEPATITIS C ANTIBODY: REACTIVE
IMMATURE GRANULOCYTES: 1 %
LYMPHOCYTES # BLD: 16 % (ref 24–43)
MCH RBC QN AUTO: 25.6 PG (ref 25.2–33.5)
MCHC RBC AUTO-ENTMCNC: 30.6 G/DL (ref 28.4–34.8)
MCV RBC AUTO: 83.5 FL (ref 82.6–102.9)
MONOCYTES # BLD: 9 % (ref 3–12)
NRBC AUTOMATED: 0 PER 100 WBC
PDW BLD-RTO: 12.8 % (ref 11.8–14.4)
PLATELET # BLD: 225 K/UL (ref 138–453)
PLATELET ESTIMATE: ABNORMAL
PMV BLD AUTO: 11.7 FL (ref 8.1–13.5)
POTASSIUM SERPL-SCNC: 4.5 MMOL/L (ref 3.7–5.3)
RBC # BLD: 5.2 M/UL (ref 3.95–5.11)
RBC # BLD: ABNORMAL 10*6/UL
SEG NEUTROPHILS: 70 % (ref 36–65)
SEGMENTED NEUTROPHILS ABSOLUTE COUNT: 5.81 K/UL (ref 1.5–8.1)
SODIUM BLD-SCNC: 142 MMOL/L (ref 135–144)
TOTAL PROTEIN: 7.5 G/DL (ref 6.4–8.3)
TSH SERPL DL<=0.05 MIU/L-ACNC: 3.15 MIU/L (ref 0.3–5)
VITAMIN B-12: 542 PG/ML (ref 232–1245)
VITAMIN D 25-HYDROXY: 26.9 NG/ML (ref 30–100)
WBC # BLD: 8.1 K/UL (ref 3.5–11.3)
WBC # BLD: ABNORMAL 10*3/UL

## 2019-07-16 PROCEDURE — 84443 ASSAY THYROID STIM HORMONE: CPT

## 2019-07-16 PROCEDURE — 87522 HEPATITIS C REVRS TRNSCRPJ: CPT

## 2019-07-16 PROCEDURE — 85025 COMPLETE CBC W/AUTO DIFF WBC: CPT

## 2019-07-16 PROCEDURE — 87902 NFCT AGT GNTYP ALYS HEP C: CPT

## 2019-07-16 PROCEDURE — 73630 X-RAY EXAM OF FOOT: CPT

## 2019-07-16 PROCEDURE — 36415 COLL VENOUS BLD VENIPUNCTURE: CPT

## 2019-07-16 PROCEDURE — 80053 COMPREHEN METABOLIC PANEL: CPT

## 2019-07-16 PROCEDURE — 82306 VITAMIN D 25 HYDROXY: CPT

## 2019-07-16 PROCEDURE — 86803 HEPATITIS C AB TEST: CPT

## 2019-07-16 PROCEDURE — 82607 VITAMIN B-12: CPT

## 2019-07-19 LAB
DIRECT EXAM: ABNORMAL
DIRECT EXAM: ABNORMAL
HCV QUANTITATIVE: NORMAL
HEPATITIS C GENOTYPE: NORMAL
Lab: ABNORMAL
SPECIMEN DESCRIPTION: ABNORMAL

## 2019-07-29 ENCOUNTER — TELEPHONE (OUTPATIENT)
Dept: GASTROENTEROLOGY | Age: 59
End: 2019-07-29

## 2019-08-20 ENCOUNTER — TELEPHONE (OUTPATIENT)
Dept: INFECTIOUS DISEASES | Age: 59
End: 2019-08-20

## 2019-10-17 ENCOUNTER — HOSPITAL ENCOUNTER (OUTPATIENT)
Dept: CT IMAGING | Facility: CLINIC | Age: 59
Discharge: HOME | End: 2019-10-19
Payer: MEDICAID

## 2019-10-17 DIAGNOSIS — K63.2 ENTEROCUTANEOUS FISTULA: ICD-10-CM

## 2019-10-17 DIAGNOSIS — L24.A9 WOUND DRAINAGE: ICD-10-CM

## 2019-10-17 PROCEDURE — 74177 CT ABD & PELVIS W/CONTRAST: CPT

## 2019-10-17 PROCEDURE — 2580000003 HC RX 258: Performed by: COLON & RECTAL SURGERY

## 2019-10-17 PROCEDURE — 6360000004 HC RX CONTRAST MEDICATION: Performed by: COLON & RECTAL SURGERY

## 2019-10-17 RX ORDER — SODIUM CHLORIDE 0.9 % (FLUSH) 0.9 %
10 SYRINGE (ML) INJECTION PRN
Status: DISCONTINUED | OUTPATIENT
Start: 2019-10-17 | End: 2019-10-20 | Stop reason: HOSPADM

## 2019-10-17 RX ORDER — 0.9 % SODIUM CHLORIDE 0.9 %
80 INTRAVENOUS SOLUTION INTRAVENOUS ONCE
Status: COMPLETED | OUTPATIENT
Start: 2019-10-17 | End: 2019-10-17

## 2019-10-17 RX ADMIN — IOVERSOL 75 ML: 741 INJECTION INTRA-ARTERIAL; INTRAVENOUS at 11:49

## 2019-10-17 RX ADMIN — IOHEXOL 50 ML: 240 INJECTION, SOLUTION INTRATHECAL; INTRAVASCULAR; INTRAVENOUS; ORAL at 11:48

## 2019-10-17 RX ADMIN — SODIUM CHLORIDE 80 ML: 9 INJECTION, SOLUTION INTRAVENOUS at 11:50

## 2019-12-18 ENCOUNTER — HOSPITAL ENCOUNTER (OUTPATIENT)
Dept: CT IMAGING | Facility: CLINIC | Age: 59
Discharge: HOME | End: 2019-12-20
Payer: MEDICAID

## 2019-12-18 DIAGNOSIS — K63.2 ENTEROCUTANEOUS FISTULA: ICD-10-CM

## 2019-12-18 LAB — POC CREATININE: 0.8 MG/DL (ref 0.6–1.4)

## 2019-12-18 PROCEDURE — 74177 CT ABD & PELVIS W/CONTRAST: CPT

## 2019-12-18 PROCEDURE — 2580000003 HC RX 258: Performed by: COLON & RECTAL SURGERY

## 2019-12-18 PROCEDURE — 6360000004 HC RX CONTRAST MEDICATION: Performed by: COLON & RECTAL SURGERY

## 2019-12-18 PROCEDURE — 82565 ASSAY OF CREATININE: CPT

## 2019-12-18 RX ORDER — SODIUM CHLORIDE 0.9 % (FLUSH) 0.9 %
10 SYRINGE (ML) INJECTION PRN
Status: DISCONTINUED | OUTPATIENT
Start: 2019-12-18 | End: 2019-12-21 | Stop reason: HOSPADM

## 2019-12-18 RX ORDER — 0.9 % SODIUM CHLORIDE 0.9 %
80 INTRAVENOUS SOLUTION INTRAVENOUS ONCE
Status: COMPLETED | OUTPATIENT
Start: 2019-12-18 | End: 2019-12-18

## 2019-12-18 RX ADMIN — SODIUM CHLORIDE 80 ML: 9 INJECTION, SOLUTION INTRAVENOUS at 10:13

## 2019-12-18 RX ADMIN — IOVERSOL 75 ML: 741 INJECTION INTRA-ARTERIAL; INTRAVENOUS at 10:12

## 2019-12-18 RX ADMIN — Medication 10 ML: at 10:13

## 2019-12-18 RX ADMIN — IOHEXOL 50 ML: 240 INJECTION, SOLUTION INTRATHECAL; INTRAVASCULAR; INTRAVENOUS; ORAL at 10:04

## 2020-01-10 ENCOUNTER — HOSPITAL ENCOUNTER (OUTPATIENT)
Age: 60
Setting detail: SPECIMEN
Discharge: HOME | End: 2020-01-10
Payer: MEDICAID

## 2020-01-10 ENCOUNTER — HOSPITAL ENCOUNTER (OUTPATIENT)
Dept: GENERAL RADIOLOGY | Facility: CLINIC | Age: 60
Discharge: HOME | End: 2020-01-12
Payer: MEDICAID

## 2020-01-10 ENCOUNTER — HOSPITAL ENCOUNTER (OUTPATIENT)
Facility: CLINIC | Age: 60
Discharge: HOME | End: 2020-01-12
Payer: MEDICAID

## 2020-01-10 PROCEDURE — 74018 RADEX ABDOMEN 1 VIEW: CPT

## 2020-01-11 LAB
CULTURE: ABNORMAL
Lab: ABNORMAL
SPECIMEN DESCRIPTION: ABNORMAL

## 2021-03-18 NOTE — GROUP NOTE
Group Therapy Note    Date: April 5    Group Start Time: 1000  Group End Time: 1055  Group Topic: Psychotherapy    [facility]    NELY Weems LSW      Group Therapy Note  Pt did not attend psychotherapy group at 10:00am today despite encouragement. 1:1 offered; pt declined.           Signature:  NELY Weems LSW Improved

## 2022-04-06 PROBLEM — K63.2 ENTEROCUTANEOUS FISTULA: Status: ACTIVE | Noted: 2019-10-21

## 2022-04-06 PROBLEM — K56.609 SMALL BOWEL OBSTRUCTION (HCC): Status: ACTIVE | Noted: 2019-07-17

## 2022-04-06 PROBLEM — F43.20 ADJUSTMENT REACTION: Status: ACTIVE | Noted: 2019-08-01

## 2022-04-06 PROBLEM — K63.2 ABDOMINAL WALL FISTULA: Status: ACTIVE | Noted: 2019-12-28

## 2022-04-07 ENCOUNTER — TELEPHONE (OUTPATIENT)
Dept: FAMILY MEDICINE CLINIC | Age: 62
End: 2022-04-07

## 2023-06-03 NOTE — GROUP NOTE
Group Therapy Note    Date: April 12    Group Start Time: 0845  Group End Time: 8341  Group Topic: Community Meeting    [facility]    [place], [place]        Group Therapy Note    Attendees: 11/20         Patient's Goal:  To increase social interaction and identify short term goal  Notes:  Pt was attentive,participated fully and was able to identify a short term goal    Status After Intervention:  Improved    Participation Level:  Active Listener and Interactive    Participation Quality: Attentive, Sharing,Supportive      Speech:  normal      Thought Process/Content: Logical      Affective Functioning: Congruent      Mood: euthymic       Level of consciousness:  Alert, Oriented x4 and Attentive      Response to Learning: Able to verbalize current knowledge/experience, Able to verbalize/acknowledge new learning, Able to retain information and Progressing to goal      Endings: None reported    Modes of Intervention: Education, Support, Socialization, Exploration, Clarifying, Problem-solving    Discipline Responsible: Psychoeducational Specialist      Signature:  Ace Bias complains of pain/discomfort

## 2024-04-26 NOTE — GROUP NOTE
Group Therapy Note    Date: June 4    Group Start Time: 1000  Group End Time: 4048  Group Topic: Relaxation    [address]       Patient refused to attend Recreational Therapy Group at 1000 after encouragement from staff. 1:1 talk time offered but patient refused.       Signature:  Rena Grimeselor Patient requests all Lab, Cardiology, and Radiology Results on their Discharge Instructions
